# Patient Record
Sex: MALE | Race: WHITE | NOT HISPANIC OR LATINO
[De-identification: names, ages, dates, MRNs, and addresses within clinical notes are randomized per-mention and may not be internally consistent; named-entity substitution may affect disease eponyms.]

---

## 2017-02-15 ENCOUNTER — APPOINTMENT (OUTPATIENT)
Dept: ELECTROPHYSIOLOGY | Facility: CLINIC | Age: 27
End: 2017-02-15

## 2017-02-15 VITALS
HEART RATE: 73 BPM | OXYGEN SATURATION: 97 % | HEIGHT: 69 IN | DIASTOLIC BLOOD PRESSURE: 74 MMHG | BODY MASS INDEX: 35.55 KG/M2 | WEIGHT: 240 LBS | SYSTOLIC BLOOD PRESSURE: 113 MMHG

## 2017-02-21 LAB
ANION GAP SERPL CALC-SCNC: 13 MMOL/L
BUN SERPL-MCNC: 12 MG/DL
CALCIUM SERPL-MCNC: 9.6 MG/DL
CHLORIDE SERPL-SCNC: 100 MMOL/L
CO2 SERPL-SCNC: 28 MMOL/L
CREAT SERPL-MCNC: 0.78 MG/DL
GLUCOSE SERPL-MCNC: 93 MG/DL
MAGNESIUM SERPL-MCNC: 2 MG/DL
POTASSIUM SERPL-SCNC: 4.3 MMOL/L
SODIUM SERPL-SCNC: 141 MMOL/L

## 2017-02-27 ENCOUNTER — APPOINTMENT (OUTPATIENT)
Dept: CARDIOLOGY | Facility: CLINIC | Age: 27
End: 2017-02-27

## 2017-03-15 ENCOUNTER — APPOINTMENT (OUTPATIENT)
Dept: ELECTROPHYSIOLOGY | Facility: CLINIC | Age: 27
End: 2017-03-15

## 2017-03-20 ENCOUNTER — OUTPATIENT (OUTPATIENT)
Dept: OUTPATIENT SERVICES | Facility: HOSPITAL | Age: 27
LOS: 1 days | End: 2017-03-20
Payer: COMMERCIAL

## 2017-03-20 DIAGNOSIS — I49.01 VENTRICULAR FIBRILLATION: ICD-10-CM

## 2017-03-20 PROCEDURE — 93018 CV STRESS TEST I&R ONLY: CPT

## 2017-03-20 PROCEDURE — 93016 CV STRESS TEST SUPVJ ONLY: CPT

## 2017-03-20 PROCEDURE — 93017 CV STRESS TEST TRACING ONLY: CPT

## 2017-03-21 DIAGNOSIS — I49.01 VENTRICULAR FIBRILLATION: ICD-10-CM

## 2017-03-28 ENCOUNTER — APPOINTMENT (OUTPATIENT)
Dept: PULMONOLOGY | Facility: CLINIC | Age: 27
End: 2017-03-28

## 2017-03-28 VITALS
HEIGHT: 69 IN | RESPIRATION RATE: 14 BRPM | BODY MASS INDEX: 37.62 KG/M2 | HEART RATE: 80 BPM | DIASTOLIC BLOOD PRESSURE: 80 MMHG | SYSTOLIC BLOOD PRESSURE: 118 MMHG | OXYGEN SATURATION: 97 % | WEIGHT: 254 LBS

## 2017-03-28 DIAGNOSIS — G47.33 OBSTRUCTIVE SLEEP APNEA (ADULT) (PEDIATRIC): ICD-10-CM

## 2017-03-28 DIAGNOSIS — F17.290 NICOTINE DEPENDENCE, OTHER TOBACCO PRODUCT, UNCOMPLICATED: ICD-10-CM

## 2017-04-19 ENCOUNTER — APPOINTMENT (OUTPATIENT)
Dept: ELECTROPHYSIOLOGY | Facility: CLINIC | Age: 27
End: 2017-04-19
Payer: COMMERCIAL

## 2017-04-19 VITALS — SYSTOLIC BLOOD PRESSURE: 104 MMHG | OXYGEN SATURATION: 100 % | DIASTOLIC BLOOD PRESSURE: 67 MMHG | HEART RATE: 62 BPM

## 2017-04-19 PROCEDURE — 99215 OFFICE O/P EST HI 40 MIN: CPT | Mod: 25

## 2017-04-19 PROCEDURE — 93282 PRGRMG EVAL IMPLANTABLE DFB: CPT

## 2017-11-01 ENCOUNTER — APPOINTMENT (OUTPATIENT)
Dept: ELECTROPHYSIOLOGY | Facility: CLINIC | Age: 27
End: 2017-11-01
Payer: COMMERCIAL

## 2017-11-01 VITALS
HEIGHT: 69 IN | SYSTOLIC BLOOD PRESSURE: 112 MMHG | DIASTOLIC BLOOD PRESSURE: 69 MMHG | HEART RATE: 62 BPM | OXYGEN SATURATION: 99 %

## 2017-11-01 PROCEDURE — 93282 PRGRMG EVAL IMPLANTABLE DFB: CPT

## 2018-01-29 LAB
ANION GAP SERPL CALC-SCNC: 14 MMOL/L
BUN SERPL-MCNC: 17 MG/DL
CALCIUM SERPL-MCNC: 9.6 MG/DL
CHLORIDE SERPL-SCNC: 102 MMOL/L
CO2 SERPL-SCNC: 25 MMOL/L
CREAT SERPL-MCNC: 0.82 MG/DL
GLUCOSE SERPL-MCNC: 93 MG/DL
MAGNESIUM SERPL-MCNC: 2.1 MG/DL
POTASSIUM SERPL-SCNC: 4.7 MMOL/L
SODIUM SERPL-SCNC: 141 MMOL/L
TSH SERPL-ACNC: 2.08 UIU/ML

## 2018-02-07 ENCOUNTER — APPOINTMENT (OUTPATIENT)
Dept: ELECTROPHYSIOLOGY | Facility: CLINIC | Age: 28
End: 2018-02-07

## 2018-02-28 ENCOUNTER — APPOINTMENT (OUTPATIENT)
Dept: ELECTROPHYSIOLOGY | Facility: CLINIC | Age: 28
End: 2018-02-28

## 2018-03-28 ENCOUNTER — APPOINTMENT (OUTPATIENT)
Dept: ELECTROPHYSIOLOGY | Facility: CLINIC | Age: 28
End: 2018-03-28

## 2018-04-04 ENCOUNTER — APPOINTMENT (OUTPATIENT)
Dept: ELECTROPHYSIOLOGY | Facility: CLINIC | Age: 28
End: 2018-04-04

## 2018-04-18 ENCOUNTER — APPOINTMENT (OUTPATIENT)
Dept: ELECTROPHYSIOLOGY | Facility: CLINIC | Age: 28
End: 2018-04-18

## 2018-05-23 ENCOUNTER — APPOINTMENT (OUTPATIENT)
Dept: ELECTROPHYSIOLOGY | Facility: CLINIC | Age: 28
End: 2018-05-23
Payer: COMMERCIAL

## 2018-05-23 VITALS
WEIGHT: 236 LBS | DIASTOLIC BLOOD PRESSURE: 68 MMHG | OXYGEN SATURATION: 100 % | BODY MASS INDEX: 34.85 KG/M2 | HEART RATE: 75 BPM | SYSTOLIC BLOOD PRESSURE: 109 MMHG

## 2018-05-23 PROCEDURE — 93282 PRGRMG EVAL IMPLANTABLE DFB: CPT

## 2018-05-23 PROCEDURE — 99215 OFFICE O/P EST HI 40 MIN: CPT | Mod: 25

## 2018-09-12 ENCOUNTER — APPOINTMENT (OUTPATIENT)
Dept: ELECTROPHYSIOLOGY | Facility: CLINIC | Age: 28
End: 2018-09-12

## 2019-06-04 ENCOUNTER — MEDICATION RENEWAL (OUTPATIENT)
Age: 29
End: 2019-06-04

## 2019-09-17 ENCOUNTER — APPOINTMENT (OUTPATIENT)
Dept: ELECTROPHYSIOLOGY | Facility: CLINIC | Age: 29
End: 2019-09-17
Payer: COMMERCIAL

## 2019-09-17 ENCOUNTER — NON-APPOINTMENT (OUTPATIENT)
Age: 29
End: 2019-09-17

## 2019-09-17 VITALS
DIASTOLIC BLOOD PRESSURE: 70 MMHG | HEART RATE: 52 BPM | WEIGHT: 220 LBS | SYSTOLIC BLOOD PRESSURE: 121 MMHG | OXYGEN SATURATION: 100 % | HEIGHT: 69 IN | BODY MASS INDEX: 32.58 KG/M2

## 2019-09-17 PROCEDURE — 99214 OFFICE O/P EST MOD 30 MIN: CPT | Mod: 25

## 2019-09-17 PROCEDURE — 93260 PRGRMG DEV EVAL IMPLTBL SYS: CPT

## 2019-09-17 PROCEDURE — 93000 ELECTROCARDIOGRAM COMPLETE: CPT | Mod: 59

## 2019-09-17 NOTE — PHYSICAL EXAM
[General Appearance - Well Developed] : well developed [Normal Appearance] : normal appearance [General Appearance - Well Nourished] : well nourished [Normal Jugular Venous V Waves Present] : normal jugular venous V waves present [Respiration, Rhythm And Depth] : normal respiratory rhythm and effort [Auscultation Breath Sounds / Voice Sounds] : lungs were clear to auscultation bilaterally [Exaggerated Use Of Accessory Muscles For Inspiration] : no accessory muscle use [Heart Rate And Rhythm] : heart rate and rhythm were normal [Heart Sounds] : normal S1 and S2 [Bowel Sounds] : normal bowel sounds [Abdomen Soft] : soft [Abnormal Walk] : normal gait [Nail Clubbing] : no clubbing of the fingernails [Skin Color & Pigmentation] : normal skin color and pigmentation [] : no rash [Oriented To Time, Place, And Person] : oriented to person, place, and time [No Anxiety] : not feeling anxious

## 2019-09-17 NOTE — PHYSICAL EXAM
[Normal Appearance] : normal appearance [General Appearance - Well Developed] : well developed [General Appearance - Well Nourished] : well nourished [Normal Jugular Venous V Waves Present] : normal jugular venous V waves present [Respiration, Rhythm And Depth] : normal respiratory rhythm and effort [Exaggerated Use Of Accessory Muscles For Inspiration] : no accessory muscle use [Auscultation Breath Sounds / Voice Sounds] : lungs were clear to auscultation bilaterally [Heart Rate And Rhythm] : heart rate and rhythm were normal [Heart Sounds] : normal S1 and S2 [Bowel Sounds] : normal bowel sounds [Abdomen Soft] : soft [Nail Clubbing] : no clubbing of the fingernails [Abnormal Walk] : normal gait [Skin Color & Pigmentation] : normal skin color and pigmentation [Oriented To Time, Place, And Person] : oriented to person, place, and time [] : no rash [No Anxiety] : not feeling anxious

## 2019-09-18 ENCOUNTER — OUTPATIENT (OUTPATIENT)
Dept: OUTPATIENT SERVICES | Facility: HOSPITAL | Age: 29
LOS: 1 days | End: 2019-09-18
Payer: COMMERCIAL

## 2019-09-18 ENCOUNTER — OTHER (OUTPATIENT)
Age: 29
End: 2019-09-18

## 2019-09-18 ENCOUNTER — APPOINTMENT (OUTPATIENT)
Dept: RADIOLOGY | Facility: CLINIC | Age: 29
End: 2019-09-18
Payer: COMMERCIAL

## 2019-09-18 DIAGNOSIS — Z45.02 ENCOUNTER FOR ADJUSTMENT AND MANAGEMENT OF AUTOMATIC IMPLANTABLE CARDIAC DEFIBRILLATOR: ICD-10-CM

## 2019-09-18 PROCEDURE — 71046 X-RAY EXAM CHEST 2 VIEWS: CPT

## 2019-09-18 PROCEDURE — 71046 X-RAY EXAM CHEST 2 VIEWS: CPT | Mod: 26

## 2019-09-18 NOTE — HISTORY OF PRESENT ILLNESS
[FreeTextEntry1] : 30 y/o M with pmhx of VF arrest during sleep s/p SICD @ [facility] with Dr. Black s/p generator change (3/24/2015, Dr. Fiore) due to premature battery depletion, repeat generator change and pocket revision (1/2016, Dr. Black) due to posterior migration, multiple inappropriate shocks for TENS unit and subsequently lead artifact resulting in vector reprograming (10/2016), appropriate ICD shock for PMVT during sleep (?long short, 2/2017), and PAF degenerating into VT @ 290bpm s/p ICD shock (8/2018).  \par Initially presented to [facility] (11/2013) w/ VF arrest. Intubated and placed in hypothermic protocol, transferred to [facility] with Dr. Black.  EPS was negative for inducible arrhythmias and cardiac MRI was unrevealing with normal biventricular function EF 59% and no LGE.  Per patient underwent genetic testing, which was negative, but no report is available. \par \par Presents today following an "untreated episode" on remote monitoring c/w AF with RVR that appears to degenerate into VT @ 300bpm (9/6/2019).  VT terminated prior to HV therapy. \par Had correlating symptoms of palpitations and near syncope.  Denies syncope or LOC. On Metoprolol 100mg and remains compliant with this medication.  \par \par In addition, we were notified by [employer] that lead Impedance has intermittently been "out of range" for the past month concerning for lose pin or impending lead fracture. \par Impedance measured today remains within normal range.  NO evidence of artifact or lead noise on primary vector.

## 2019-09-18 NOTE — DISCUSSION/SUMMARY
[FreeTextEntry1] : 28 y/o M with pmhx of VF arrest of unclear etiology s/p SICD w/ generator change (3/24/2015 - premature battery depletion), repeat generator change/pocket revision (1/2016 - posterior migration, multiple inappropriate shocks for lead noise resulting in vector reprograming (10/2016), appropriate ICD shock for PMVT during sleep (2/2017), and intermittent symptomatic PAF w/ RVR with prior degeneration into VT @ 290bpm s/p ICD shock (8/2018).  \par Presents today due to concern for lead impedance and integrity, as well as recent untreated episode of symptomatic PAF with RVR and VT.  [id number] 0 - not current on a/c.\par \par -  Per [facility] [employer], recommend Chest Xray PA/Lateral with additional magnified image of ICD can to assess lead insertion.  Will review images with [place] and Dr. Barkley to determine the next appropriate step\par - Regarding AF with RVR and resultant VT.  Increase Metoprolol 200mg. Once ICD evaluation is complete, will consider more aggressive rhythm control strategy, including AF ablation, to avoid future ICD shocks. \par - Advised to f/up for genetic testing as previously advised by Dr. Black\par - Advised to closest ED if he develops any symptoms especially dizziness, LH, syncope near syncope or ICD shocks\par \par Seen and Dr. Barkley \par Caitlin Salazar PAC

## 2019-09-18 NOTE — REVIEW OF SYSTEMS
[Palpitations] : palpitations [Dizziness] : dizziness [Negative] : Heme/Lymph [Fever] : no fever [Chills] : no chills [Feeling Fatigued] : not feeling fatigued [Blurry Vision] : no blurred vision [Chest  Pressure] : no chest pressure [Shortness Of Breath] : no shortness of breath [Dyspnea on exertion] : not dyspnea during exertion [Cough] : no cough [Chest Pain] : no chest pain

## 2019-09-18 NOTE — REVIEW OF SYSTEMS
[Palpitations] : palpitations [Dizziness] : dizziness [Negative] : Heme/Lymph [Chills] : no chills [Fever] : no fever [Blurry Vision] : no blurred vision [Feeling Fatigued] : not feeling fatigued [Chest  Pressure] : no chest pressure [Dyspnea on exertion] : not dyspnea during exertion [Shortness Of Breath] : no shortness of breath [Chest Pain] : no chest pain [Cough] : no cough

## 2019-09-18 NOTE — DISCUSSION/SUMMARY
[FreeTextEntry1] : 30 y/o M with pmhx of VF arrest of unclear etiology s/p SICD w/ generator change (3/24/2015 - premature battery depletion), repeat generator change/pocket revision (1/2016 - posterior migration, multiple inappropriate shocks for lead noise resulting in vector reprograming (10/2016), appropriate ICD shock for PMVT during sleep (2/2017), and intermittent symptomatic PAF w/ RVR with prior degeneration into VT @ 290bpm s/p ICD shock (8/2018).  \par Presents today due to concern for lead impedance and integrity, as well as recent untreated episode of symptomatic PAF with RVR and VT.  [id number] 0 - not current on a/c.\par \par -  Per [employer], recommend Chest Xray PA/Lateral with additional magnified image of ICD can to assess lead insertion.  Will review images with [employer] and Dr. Barkley to determine the next appropriate step\par - Regarding AF with RVR and resultant VT.  Increase Metoprolol 200mg. Once ICD evaluation is complete, will consider more aggressive rhythm control strategy, including AF ablation, to avoid future ICD shocks. \par - Advised to f/up for genetic testing as previously advised by Dr. Black\par - Advised to closest ED if he develops any symptoms especially dizziness, LH, syncope near syncope or ICD shocks\par \par Seen and Dr. Barkley \par Caitlin Salazar PAC

## 2019-09-18 NOTE — HISTORY OF PRESENT ILLNESS
[FreeTextEntry1] : 28 y/o M with pmhx of VF arrest during sleep s/p SICD @ [facility] with Dr. Black s/p generator change (3/24/2015, Dr. Fiore) due to premature battery depletion, repeat generator change and pocket revision (1/2016, Dr. Black) due to posterior migration, multiple inappropriate shocks for TENS unit and subsequently lead artifact resulting in vector reprograming (10/2016), appropriate ICD shock for PMVT during sleep (?long short, 2/2017), and PAF degenerating into VT @ 290bpm s/p ICD shock (8/2018).  \par Initially presented to [facility] (11/2013) w/ VF arrest. Intubated and placed in hypothermic protocol, transferred to [facility] with Dr. Black.  EPS was negative for inducible arrhythmias and cardiac MRI was unrevealing with normal biventricular function EF 59% and no LGE.  Per patient underwent genetic testing, which was negative, but no report is available. \par \par Presents today following an "untreated episode" on remote monitoring c/w AF with RVR that appears to degenerate into VT @ 300bpm (9/6/2019).  VT terminated prior to HV therapy. \par Had correlating symptoms of palpitations and near syncope.  Denies syncope or LOC. On Metoprolol 100mg and remains compliant with this medication.  \par \par In addition, we were notified by [employer] that lead Impedance has intermittently been "out of range" for the past month concerning for lose pin or impending lead fracture. \par Impedance measured today remains within normal range.  NO evidence of artifact or lead noise on primary vector.

## 2019-09-18 NOTE — ADDENDUM
[FreeTextEntry1] : I have personally seen, examined, and participated in the care of this patient. I have reviewed all pertinent clinical information, including history, physical exam, plan, and the PA/NP's note and agree except as noted below.\par \par Briefly, 29 year old male with h/o VF arrest s/p S-ICD with multiple issues over the past several years requiring 2 generator replacements (2015 premature battery depletion, 1/2016 posterior device migration, inappropriate shocks from lead noise) but also with appropriate ICD shocks who was noted to have multiple high impedance alerts on his device for which he presents today. Last week he had AF with RVR with precipitation of VT which resolved spontaneously.\par \par Discussed with [facility] who recommended patient get 2vCXR to evaluate for possible lead/header issue. Will double Metoprolol to 200mg daily to reduce ventricular rates while in AF.\par \par Aydin Barkley MD\par Clinical Cardiac Electrophysiology

## 2019-09-20 ENCOUNTER — INPATIENT (INPATIENT)
Facility: HOSPITAL | Age: 29
LOS: 0 days | Discharge: HOME | DRG: 245 | End: 2019-09-21
Attending: INTERNAL MEDICINE | Admitting: INTERNAL MEDICINE
Payer: COMMERCIAL

## 2019-09-20 ENCOUNTER — TRANSCRIPTION ENCOUNTER (OUTPATIENT)
Age: 29
End: 2019-09-20

## 2019-09-20 VITALS
HEIGHT: 69 IN | DIASTOLIC BLOOD PRESSURE: 67 MMHG | SYSTOLIC BLOOD PRESSURE: 118 MMHG | OXYGEN SATURATION: 100 % | TEMPERATURE: 98 F | WEIGHT: 220.02 LBS | HEART RATE: 87 BPM

## 2019-09-20 DIAGNOSIS — I49.1 ATRIAL PREMATURE DEPOLARIZATION: ICD-10-CM

## 2019-09-20 DIAGNOSIS — Z95.810 PRESENCE OF AUTOMATIC (IMPLANTABLE) CARDIAC DEFIBRILLATOR: Chronic | ICD-10-CM

## 2019-09-20 LAB
ABO RH CONFIRMATION: SIGNIFICANT CHANGE UP
ANION GAP SERPL CALC-SCNC: 11 MMOL/L — SIGNIFICANT CHANGE UP (ref 5–17)
APTT BLD: 29.1 SEC — SIGNIFICANT CHANGE UP (ref 27.5–36.3)
BLD GP AB SCN SERPL QL: SIGNIFICANT CHANGE UP
BUN SERPL-MCNC: 17 MG/DL — SIGNIFICANT CHANGE UP (ref 8–20)
CALCIUM SERPL-MCNC: 9.2 MG/DL — SIGNIFICANT CHANGE UP (ref 8.6–10.2)
CHLORIDE SERPL-SCNC: 100 MMOL/L — SIGNIFICANT CHANGE UP (ref 98–107)
CO2 SERPL-SCNC: 27 MMOL/L — SIGNIFICANT CHANGE UP (ref 22–29)
CREAT SERPL-MCNC: 0.64 MG/DL — SIGNIFICANT CHANGE UP (ref 0.5–1.3)
GLUCOSE SERPL-MCNC: 92 MG/DL — SIGNIFICANT CHANGE UP (ref 70–115)
HCT VFR BLD CALC: 41.6 % — SIGNIFICANT CHANGE UP (ref 39–50)
HGB BLD-MCNC: 13.2 G/DL — SIGNIFICANT CHANGE UP (ref 13–17)
INR BLD: 0.95 RATIO — SIGNIFICANT CHANGE UP (ref 0.88–1.16)
MAGNESIUM SERPL-MCNC: 2.2 MG/DL — SIGNIFICANT CHANGE UP (ref 1.6–2.6)
MCHC RBC-ENTMCNC: 28.1 PG — SIGNIFICANT CHANGE UP (ref 27–34)
MCHC RBC-ENTMCNC: 31.7 GM/DL — LOW (ref 32–36)
MCV RBC AUTO: 88.7 FL — SIGNIFICANT CHANGE UP (ref 80–100)
PLATELET # BLD AUTO: 76 K/UL — LOW (ref 150–400)
POTASSIUM SERPL-MCNC: 4.6 MMOL/L — SIGNIFICANT CHANGE UP (ref 3.5–5.3)
POTASSIUM SERPL-SCNC: 4.6 MMOL/L — SIGNIFICANT CHANGE UP (ref 3.5–5.3)
PROTHROM AB SERPL-ACNC: 10.9 SEC — SIGNIFICANT CHANGE UP (ref 10–12.9)
RBC # BLD: 4.69 M/UL — SIGNIFICANT CHANGE UP (ref 4.2–5.8)
RBC # FLD: 12.8 % — SIGNIFICANT CHANGE UP (ref 10.3–14.5)
SODIUM SERPL-SCNC: 138 MMOL/L — SIGNIFICANT CHANGE UP (ref 135–145)
WBC # BLD: 4.99 K/UL — SIGNIFICANT CHANGE UP (ref 3.8–10.5)
WBC # FLD AUTO: 4.99 K/UL — SIGNIFICANT CHANGE UP (ref 3.8–10.5)

## 2019-09-20 PROCEDURE — 71045 X-RAY EXAM CHEST 1 VIEW: CPT | Mod: 26

## 2019-09-20 PROCEDURE — 33272 RMVL OF SUBQ DEFIBRILLATOR: CPT

## 2019-09-20 PROCEDURE — 93010 ELECTROCARDIOGRAM REPORT: CPT | Mod: 76

## 2019-09-20 PROCEDURE — 33270 INS/REP SUBQ DEFIBRILLATOR: CPT

## 2019-09-20 RX ORDER — KETOROLAC TROMETHAMINE 30 MG/ML
15 SYRINGE (ML) INJECTION ONCE
Refills: 0 | Status: DISCONTINUED | OUTPATIENT
Start: 2019-09-21 | End: 2019-09-21

## 2019-09-20 RX ORDER — ACETAMINOPHEN 500 MG
650 TABLET ORAL EVERY 6 HOURS
Refills: 0 | Status: DISCONTINUED | OUTPATIENT
Start: 2019-09-20 | End: 2019-09-21

## 2019-09-20 RX ORDER — CEFAZOLIN SODIUM 1 G
2000 VIAL (EA) INJECTION
Refills: 0 | Status: COMPLETED | OUTPATIENT
Start: 2019-09-21 | End: 2019-09-21

## 2019-09-20 RX ORDER — METOPROLOL TARTRATE 50 MG
1 TABLET ORAL
Qty: 0 | Refills: 0 | DISCHARGE

## 2019-09-20 RX ORDER — CEPHALEXIN 500 MG
500 CAPSULE ORAL EVERY 12 HOURS
Refills: 0 | Status: DISCONTINUED | OUTPATIENT
Start: 2019-09-21 | End: 2019-09-21

## 2019-09-20 RX ORDER — OXYCODONE AND ACETAMINOPHEN 5; 325 MG/1; MG/1
1 TABLET ORAL EVERY 4 HOURS
Refills: 0 | Status: DISCONTINUED | OUTPATIENT
Start: 2019-09-20 | End: 2019-09-21

## 2019-09-20 RX ORDER — HYDROMORPHONE HYDROCHLORIDE 2 MG/ML
0.5 INJECTION INTRAMUSCULAR; INTRAVENOUS; SUBCUTANEOUS EVERY 4 HOURS
Refills: 0 | Status: DISCONTINUED | OUTPATIENT
Start: 2019-09-20 | End: 2019-09-21

## 2019-09-20 RX ORDER — METOPROLOL TARTRATE 50 MG
200 TABLET ORAL DAILY
Refills: 0 | Status: DISCONTINUED | OUTPATIENT
Start: 2019-09-20 | End: 2019-09-21

## 2019-09-20 RX ADMIN — HYDROMORPHONE HYDROCHLORIDE 0.5 MILLIGRAM(S): 2 INJECTION INTRAMUSCULAR; INTRAVENOUS; SUBCUTANEOUS at 20:55

## 2019-09-20 RX ADMIN — HYDROMORPHONE HYDROCHLORIDE 0.5 MILLIGRAM(S): 2 INJECTION INTRAMUSCULAR; INTRAVENOUS; SUBCUTANEOUS at 21:10

## 2019-09-20 RX ADMIN — Medication 200 MILLIGRAM(S): at 23:12

## 2019-09-20 NOTE — H&P PST ADULT - ASSESSMENT
29 year old with history of VF arrest of unclear etiology s/p SICD @ [facility] w/Dr. Black, w/ generator change (3/24/2015 - premature battery depletion), repeat generator change/pocket revision (1/2016 - posterior migration, multiple inappropriate shocks for lead noise resulting in vector reprograming (10/2016), appropriate ICD shock for PMVT during sleep (2/2017), and intermittent symptomatic PAF w/ RVR with prior degeneration into VT @ 290bpm s/p ICD shock (8/2018).  He had a recent untreated episode of symptomatic PAF with RVR and RV, CHADSVASC = 0 not on anticoagulation.  In addition, we were notified by [employer] that lead Impedance has intermittently been "out of range" for the past month concerning for lose pin or impending lead fracture.   On recent device interrogation, lead impedance measured remains within normal range.  NO evidence of artifact or lead noise on primary vector.  [employer] technician recommended CXR PA and LAT to evaluate lead positioning.  Upon review of the images, the patient's electrode pin appears to be well seated in the header and the device otherwise appears to be well positioned.  These findings were discussed with the [employer] team, who felt that it is possible that the set screw could be loose in the header, and if not that, possible electrode problem.  It was recommended that pt pursue lead revision.  Pt presents today for elective SQ ICD explant/implant.

## 2019-09-20 NOTE — DISCHARGE NOTE PROVIDER - CARE PROVIDER_API CALL
Aydin Barkley)  Cardiology; Internal Medicine  [address] [place] [address], [place] [address]  Phone: [phone]  Fax: [phone]  Follow Up Time: Aydin Barkley)  Cardiology; Internal Medicine  [address] [place] [address], [place] [address]  Phone: [phone]  Fax: [phone]  Follow Up Time: 2 weeks

## 2019-09-20 NOTE — DISCHARGE NOTE PROVIDER - NSDCFUSCHEDAPPT_GEN_ALL_CORE_FT
JESS CRAMER ; 10/10/2019 ; [facility] ; 10/10/2019 ; [facility] ; 10/10/2019 ; [facility]

## 2019-09-20 NOTE — H&P PST ADULT - RS GEN PE MLT RESP DETAILS PC
no rhonchi/no wheezes/respirations non-labored/clear to auscultation bilaterally/good air movement/breath sounds equal/airway patent

## 2019-09-20 NOTE — PROGRESS NOTE ADULT - SUBJECTIVE AND OBJECTIVE BOX
Admission Criteria  Please admit the patient to the following service: CARDIOLOGY    Major Criteria:  - Continuous EKG monitoring is required for condition causing arrhythmia (hyperkalemia, etc)  - Significant volume load > 200 ml    Admit to: 3GUL     Patient is being admitted to the inpatient service due to high risk characteristics and need for further management/monitoring and is considered to be at a significantly increased risk of major adverse cardiac and vascular events if discharged.

## 2019-09-20 NOTE — DISCHARGE NOTE PROVIDER - NSDCCPCAREPLAN_GEN_ALL_CORE_FT
PRINCIPAL DISCHARGE DIAGNOSIS  Diagnosis: Ventricular fibrillation  Assessment and Plan of Treatment:

## 2019-09-20 NOTE — DISCHARGE NOTE PROVIDER - NSDCCPTREATMENT_GEN_ALL_CORE_FT
PRINCIPAL PROCEDURE  Procedure: Insertion or replacement, ICD system, single or dual chamber  Findings and Treatment: Cardiac Device Implant Post Operative Instructions  - Do not touch the incision until it is completely healed.   - There are Steristrips (white strips of tape) on your incision, which will start to peel off on their own over the next 2-3 weeks. Do not pick at or peel off the Steristrips.   - Bruising around the implant site or over the chest, side or arm near the incision is normal, and will take a few weeks to resolve.  -Do not lift the affected arm higher than 90 degrees (shoulder height) in any direction for 4 weeks.   - Do not push, pull or lift anything heavier than 10 lbs (about a gallon of milk) with the affected arm for 4 weeks.     - Do not apply soaps, creams, lotions, ointments or powders to the incision until it is completely healed.  - You may take a shower in 24 hours, and allow the water to run over the incision. However, do not submerge the incision in water: do not swim or soak in bath tubs, hot tubs, swimming pools, etc.   You should call the doctor if:   - You notice redness, drainage, swelling, increased tenderness, hot sensation around the incision, bleeding or incision edges pulling apart.  - Your temperature is greater than 100 degrees F for more than 24 hours.  - You notice swelling or bulging at the incision or around the device that was not there when you left the hospital or is increasing in size.  - You experience increased difficulty breathing.  - You notice new/worsening swelling in your legs and ankles.  - You faint or have dizzy spells.  - You have any questions or concerns regarding your device or the procedure.

## 2019-09-20 NOTE — DISCHARGE NOTE PROVIDER - NSDCFUADDINST_GEN_ALL_CORE_FT
Follow up with Dr. Barkley in 2 weeks.  Our office will contact you in 3-5 days to schedule this appointment. Please call [phone] with questions or concerns. Follow up with Dr. Barkley in 2 weeks.  Our office will contact you in 3-5 days to schedule this appointment. Please call [phone] with questions or concerns.  Follow up with Heme/Onc as outpatient to assess for thrombocytopenia

## 2019-09-20 NOTE — H&P PST ADULT - HISTORY OF PRESENT ILLNESS
29 year old with history of VF arrest of unclear etiology s/p SICD @ [facility] w/Dr. Black, w/ generator change (3/24/2015 - premature battery depletion), repeat generator change/pocket revision (2016 - posterior migration, multiple inappropriate shocks for lead noise resulting in vector reprograming (10/2016), appropriate ICD shock for PMVT during sleep (2017), and intermittent symptomatic PAF w/ RVR with prior degeneration into VT @ 290bpm s/p ICD shock (2018).  He had a recent untreated episode of symptomatic PAF with RVR and RV, CHADSVASC = 0 not on anticoagulation.  In addition, we were notified by [employer] that lead Impedance has intermittently been "out of range" for the past month concerning for lose pin or impending lead fracture.   On recent device interrogation, lead impedance measured remains within normal range.  NO evidence of artifact or lead noise on primary vector.  [employer] technician recommended CXR PA and LAT to evaluate lead positioning.  Upon review of the images, the patient's electrode pin appears to be well seated in the header and the device otherwise appears to be well positioned.  These findings were discussed with the [employer] team, who felt that it is possible that the set screw could be loose in the header, and if not that, possible electrode problem.  It was recommended that pt pursue lead revision.  Pt presents today for elective SQ ICD explant/implant.  I    Cardiology Summary:  EK2019, SR @ 69bpm, normal axis, QTc 415   Cardiac MRI: 2013, normal biventricular function, NO LGE 29 year old with history of VF arrest of unclear etiology s/p SICD @ [facility] w/Dr. Black, w/ generator change (3/24/2015 - premature battery depletion), repeat generator change/pocket revision (2016 - posterior migration, multiple inappropriate shocks for lead noise resulting in vector reprograming (10/2016), appropriate ICD shock for PMVT during sleep (2017), and intermittent symptomatic PAF w/ RVR with prior degeneration into VT @ 290bpm s/p ICD shock (2018).  He had a recent untreated episode of symptomatic PAF with RVR and RV, CHADSVASC = 0 not on anticoagulation.  In addition, we were notified by [employer] that lead Impedance has intermittently been "out of range" for the past month concerning for lose pin or impending lead fracture.   On recent device interrogation, lead impedance measured remains within normal range.  NO evidence of artifact or lead noise on primary vector.  [employer] technician recommended CXR PA and LAT to evaluate lead positioning.  Upon review of the images, the patient's electrode pin appears to be well seated in the header and the device otherwise appears to be well positioned.  These findings were discussed with the [employer] team, who felt that it is possible that the set screw could be loose in the header, and if not that, possible electrode problem.  It was recommended that pt pursue lead revision.  Pt presents today for elective SQ ICD explant/implant.      Cardiology Summary:  EK2019, SR @ 69bpm, normal axis, QTc 415   Cardiac MRI: 2013, normal biventricular function, NO LGE

## 2019-09-20 NOTE — DISCHARGE NOTE PROVIDER - HOSPITAL COURSE
29 year old with history of VF arrest of unclear etiology s/p SICD @ [facility] w/Dr. Black, w/ generator change (3/24/2015 - premature battery depletion), repeat generator change/pocket revision (1/2016 - posterior migration, multiple inappropriate shocks for lead noise resulting in vector reprograming (10/2016), appropriate ICD shock for PMVT during sleep (2/2017), and intermittent symptomatic PAF w/ RVR with prior degeneration into VT @ 290bpm s/p ICD shock (8/2018).  He had a recent untreated episode of symptomatic PAF with RVR and RV, CHADSVASC = 0 not on anticoagulation.  In addition, we were notified by [employer] that lead Impedance has intermittently been "out of range" for the past month concerning for lose pin or impending lead fracture.     On recent device interrogation, lead impedance measured remains within normal range.  NO evidence of artifact or lead noise on primary vector.  [employer] technician recommended CXR PA and LAT to evaluate lead positioning.  Upon review of the images, the patient's electrode pin appears to be well seated in the header and the device otherwise appears to be well positioned.  These findings were discussed with the [employer] team, who felt that it is possible that the set screw could be loose in the header, and if not that, possible electrode problem.  It was recommended that pt pursue lead revision.  Pt presented electively and is now status post uncomplicated SQ ICD explant and new SQ ICD implant.

## 2019-09-20 NOTE — PROGRESS NOTE ADULT - SUBJECTIVE AND OBJECTIVE BOX
ELECTROPHYSIOLOGY BRIEF POST-OP NOTE    I have personally seen and examined the patient. I agree with the history and physical which I have reviewed and noted any changes below.     PRE-OP DIAGNOSIS: SICD malfunction    POST-OP DIAGNOSIS: same     PROCEDURE: SICD system extraction followed by new SICD system implantation    Physician: Aydin Barkley MD  Assistant: None    ESTIMATED BLOOD LOSS: <10 mL    ANESTHESIA TYPE:  [   ]General Anesthesia  [ x ]Sedation  [ x ]Local/Regional    CONDITION:  [  ]Critical  [  ]Serious  [ x ]Stable  [ x ]Good    SPECIMENS REMOVED (if applicable): Old BSI SICD generator and lead    IMPLANT (if applicable): new BSI Sub Q ICD system    EKG: NSR at 98bpm; QRSD 90ms    Vital Signs Last 24 Hrs  T(C): 36.6 (20 Sep 2019 10:26), Max: 36.6 (20 Sep 2019 10:26)  T(F): 97.8 (20 Sep 2019 10:26), Max: 97.8 (20 Sep 2019 10:26)  HR: 87 (20 Sep 2019 10:26) (87 - 87)  BP: 118/67 (20 Sep 2019 10:26) (118/67 - 118/67)  BP(mean): 84 (20 Sep 2019 10:26) (84 - 84)  SpO2: 100% (20 Sep 2019 10:26) (100% - 100%)    Physical Exam:  Constitutional: NAD, AAOx3  Cardiovascular: +S1S2 RRR  Pulmonary: CTA b/l, unlabored  Left Flank: Abdominal binder and dressings CDI  GI: soft NTND +BS  Extremities: no pedal edema,   Neuro: non focal, MARTINS x4    A/P  29 year old with history of VF arrest of unclear etiology s/p SICD @ [facility] w/Dr. Black, w/ generator change (3/24/2015 - premature battery depletion), repeat generator change/pocket revision (1/2016 - posterior migration, multiple inappropriate shocks for lead noise resulting in vector reprograming (10/2016), appropriate ICD shock for PMVT during sleep (2/2017), and intermittent symptomatic PAF w/ RVR with prior degeneration into VT @ 290bpm s/p ICD shock (8/2018). Patient now s/p successful SICD system extraction and new SICD system implantation for high voltage lead impedances out of range.     -Ancef 2gram IV Q8hr x 2 more doses  -Chest X-ray STAT   -Chest X-ray PA and LAT in am to eval lead position  -NO LOVENOX OR HEPARIN INCLUDING SQ UNLESS CLEARED BY EP  - Aggressive pain Rx  - AM labs and EKG  - Discharge planning home tomorrow AM ELECTROPHYSIOLOGY BRIEF POST-OP NOTE    I have personally seen and examined the patient. I agree with the history and physical which I have reviewed and noted any changes below.     PRE-OP DIAGNOSIS: SICD malfunction    POST-OP DIAGNOSIS: same     PROCEDURE: SICD system extraction followed by new SICD system implantation    Physician: Aydin Barkley MD  Assistant: None    ESTIMATED BLOOD LOSS: <10 mL    ANESTHESIA TYPE:  [   ]General Anesthesia  [ x ]Sedation  [ x ]Local/Regional    CONDITION:  [  ]Critical  [  ]Serious  [ x ]Stable  [ x ]Good    SPECIMENS REMOVED (if applicable): Old BSI SICD generator and lead    IMPLANT (if applicable): new BSI Sub Q ICD system    EKG: NSR at 98bpm; QRSD 90ms    Vital Signs Last 24 Hrs  T(C): 36.6 (20 Sep 2019 10:26), Max: 36.6 (20 Sep 2019 10:26)  T(F): 97.8 (20 Sep 2019 10:26), Max: 97.8 (20 Sep 2019 10:26)  HR: 87 (20 Sep 2019 10:26) (87 - 87)  BP: 118/67 (20 Sep 2019 10:26) (118/67 - 118/67)  BP(mean): 84 (20 Sep 2019 10:26) (84 - 84)  SpO2: 100% (20 Sep 2019 10:26) (100% - 100%)    Physical Exam:  Constitutional: NAD, AAOx3  Cardiovascular: +S1S2 RRR  Pulmonary: CTA b/l, unlabored  Left Flank: Abdominal binder and dressings CDI  GI: soft NTND +BS  Extremities: no pedal edema,   Neuro: non focal, MARTINS x4    A/P  29 year old with history of VF arrest of unclear etiology s/p SICD @ [facility] w/Dr. Black, w/ generator change (3/24/2015 - premature battery depletion), repeat generator change/pocket revision (1/2016 - posterior migration, multiple inappropriate shocks for lead noise resulting in vector reprograming (10/2016), appropriate ICD shock for PMVT during sleep (2/2017), and intermittent symptomatic PAF w/ RVR with prior degeneration into VT @ 290bpm s/p ICD shock (8/2018). Patient now s/p successful SICD system extraction and new SICD system implantation for high voltage lead impedances out of range.     -Ancef 2gram IV Q8hr x 2 more doses; Keflex 500mg BID x3 days at time of discharge  -Chest X-ray STAT   -Chest X-ray PA and LAT in am to eval lead position  -NO LOVENOX OR HEPARIN INCLUDING SQ UNLESS CLEARED BY EP  - Aggressive pain Rx  - AM labs and EKG  - Discharge planning home tomorrow AM

## 2019-09-21 ENCOUNTER — TRANSCRIPTION ENCOUNTER (OUTPATIENT)
Age: 29
End: 2019-09-21

## 2019-09-21 VITALS
SYSTOLIC BLOOD PRESSURE: 125 MMHG | RESPIRATION RATE: 20 BRPM | OXYGEN SATURATION: 99 % | HEART RATE: 70 BPM | DIASTOLIC BLOOD PRESSURE: 59 MMHG | TEMPERATURE: 98 F

## 2019-09-21 LAB
ANION GAP SERPL CALC-SCNC: 12 MMOL/L — SIGNIFICANT CHANGE UP (ref 5–17)
BASOPHILS # BLD AUTO: 0.01 K/UL — SIGNIFICANT CHANGE UP (ref 0–0.2)
BASOPHILS NFR BLD AUTO: 0.1 % — SIGNIFICANT CHANGE UP (ref 0–2)
BUN SERPL-MCNC: 14 MG/DL — SIGNIFICANT CHANGE UP (ref 8–20)
CALCIUM SERPL-MCNC: 8.9 MG/DL — SIGNIFICANT CHANGE UP (ref 8.6–10.2)
CHLORIDE SERPL-SCNC: 101 MMOL/L — SIGNIFICANT CHANGE UP (ref 98–107)
CO2 SERPL-SCNC: 23 MMOL/L — SIGNIFICANT CHANGE UP (ref 22–29)
CREAT SERPL-MCNC: 0.65 MG/DL — SIGNIFICANT CHANGE UP (ref 0.5–1.3)
EOSINOPHIL # BLD AUTO: 0 K/UL — SIGNIFICANT CHANGE UP (ref 0–0.5)
EOSINOPHIL NFR BLD AUTO: 0 % — SIGNIFICANT CHANGE UP (ref 0–6)
GLUCOSE SERPL-MCNC: 123 MG/DL — HIGH (ref 70–115)
HCT VFR BLD CALC: 37.7 % — LOW (ref 39–50)
HGB BLD-MCNC: 12.3 G/DL — LOW (ref 13–17)
IMM GRANULOCYTES NFR BLD AUTO: 0.5 % — SIGNIFICANT CHANGE UP (ref 0–1.5)
LYMPHOCYTES # BLD AUTO: 0.59 K/UL — LOW (ref 1–3.3)
LYMPHOCYTES # BLD AUTO: 5.1 % — LOW (ref 13–44)
MAGNESIUM SERPL-MCNC: 2.1 MG/DL — SIGNIFICANT CHANGE UP (ref 1.6–2.6)
MCHC RBC-ENTMCNC: 28.7 PG — SIGNIFICANT CHANGE UP (ref 27–34)
MCHC RBC-ENTMCNC: 32.6 GM/DL — SIGNIFICANT CHANGE UP (ref 32–36)
MCV RBC AUTO: 88.1 FL — SIGNIFICANT CHANGE UP (ref 80–100)
MONOCYTES # BLD AUTO: 0.37 K/UL — SIGNIFICANT CHANGE UP (ref 0–0.9)
MONOCYTES NFR BLD AUTO: 3.2 % — SIGNIFICANT CHANGE UP (ref 2–14)
NEUTROPHILS # BLD AUTO: 10.54 K/UL — HIGH (ref 1.8–7.4)
NEUTROPHILS NFR BLD AUTO: 91.1 % — HIGH (ref 43–77)
PLATELET # BLD AUTO: 90 K/UL — LOW (ref 150–400)
POTASSIUM SERPL-MCNC: 4.3 MMOL/L — SIGNIFICANT CHANGE UP (ref 3.5–5.3)
POTASSIUM SERPL-SCNC: 4.3 MMOL/L — SIGNIFICANT CHANGE UP (ref 3.5–5.3)
RBC # BLD: 4.28 M/UL — SIGNIFICANT CHANGE UP (ref 4.2–5.8)
RBC # FLD: 12.8 % — SIGNIFICANT CHANGE UP (ref 10.3–14.5)
SODIUM SERPL-SCNC: 136 MMOL/L — SIGNIFICANT CHANGE UP (ref 135–145)
WBC # BLD: 11.57 K/UL — HIGH (ref 3.8–10.5)
WBC # FLD AUTO: 11.57 K/UL — HIGH (ref 3.8–10.5)

## 2019-09-21 PROCEDURE — 93010 ELECTROCARDIOGRAM REPORT: CPT

## 2019-09-21 PROCEDURE — 71046 X-RAY EXAM CHEST 2 VIEWS: CPT | Mod: 26

## 2019-09-21 RX ORDER — ACETAMINOPHEN 500 MG
2 TABLET ORAL
Qty: 0 | Refills: 0 | DISCHARGE
Start: 2019-09-21

## 2019-09-21 RX ORDER — CEPHALEXIN 500 MG
1 CAPSULE ORAL
Qty: 6 | Refills: 0
Start: 2019-09-21 | End: 2019-09-23

## 2019-09-21 RX ADMIN — HYDROMORPHONE HYDROCHLORIDE 0.5 MILLIGRAM(S): 2 INJECTION INTRAMUSCULAR; INTRAVENOUS; SUBCUTANEOUS at 10:15

## 2019-09-21 RX ADMIN — Medication 100 MILLIGRAM(S): at 09:06

## 2019-09-21 RX ADMIN — HYDROMORPHONE HYDROCHLORIDE 0.5 MILLIGRAM(S): 2 INJECTION INTRAMUSCULAR; INTRAVENOUS; SUBCUTANEOUS at 01:20

## 2019-09-21 RX ADMIN — HYDROMORPHONE HYDROCHLORIDE 0.5 MILLIGRAM(S): 2 INJECTION INTRAMUSCULAR; INTRAVENOUS; SUBCUTANEOUS at 06:30

## 2019-09-21 RX ADMIN — Medication 100 MILLIGRAM(S): at 00:20

## 2019-09-21 RX ADMIN — Medication 15 MILLIGRAM(S): at 00:35

## 2019-09-21 RX ADMIN — HYDROMORPHONE HYDROCHLORIDE 0.5 MILLIGRAM(S): 2 INJECTION INTRAMUSCULAR; INTRAVENOUS; SUBCUTANEOUS at 06:10

## 2019-09-21 RX ADMIN — HYDROMORPHONE HYDROCHLORIDE 0.5 MILLIGRAM(S): 2 INJECTION INTRAMUSCULAR; INTRAVENOUS; SUBCUTANEOUS at 10:07

## 2019-09-21 RX ADMIN — HYDROMORPHONE HYDROCHLORIDE 0.5 MILLIGRAM(S): 2 INJECTION INTRAMUSCULAR; INTRAVENOUS; SUBCUTANEOUS at 01:05

## 2019-09-21 RX ADMIN — Medication 15 MILLIGRAM(S): at 00:20

## 2019-09-21 NOTE — PROGRESS NOTE ADULT - SUBJECTIVE AND OBJECTIVE BOX
Patient is a 29y old  Male who presents with a chief complaint of Admission post SICD device interrogation (20 Sep 2019 19:18)      HPI:  29 year old with history of VF arrest of unclear etiology s/p SICD @ [facility] w/Dr. Black, w/ generator change (3/24/2015 - premature battery depletion), repeat generator change/pocket revision (2016 - posterior migration, multiple inappropriate shocks for lead noise resulting in vector reprograming (10/2016), appropriate ICD shock for PMVT during sleep (2017), and intermittent symptomatic PAF w/ RVR with prior degeneration into VT @ 290bpm s/p ICD shock (2018).  He had a recent untreated episode of symptomatic PAF with RVR and RV, CHADSVASC = 0 not on anticoagulation.  In addition, we were notified by [facility] that lead Impedance has intermittently been "out of range" for the past month concerning for lose pin or impending lead fracture.   On recent device interrogation, lead impedance measured remains within normal range.  NO evidence of artifact or lead noise on primary vector.  [facility] technician recommended CXR PA and LAT to evaluate lead positioning.  Upon review of the images, the patient's electrode pin appears to be well seated in the header and the device otherwise appears to be well positioned.  These findings were discussed with the [facility] team, who felt that it is possible that the set screw could be loose in the header, and if not that, possible electrode problem.  It was recommended that pt pursue lead revision.  Pt presents today for elective SQ ICD explant/implant.      Patient now sp SQ ICD replacement with [employer] SQ-RX    Cardiology Summary:  [id number]  Sinus Bradycardia with APC's and PVC's   Cardiac MRI: 2013, normal biventricular function, NO LGE (20 Sep 2019 10:26)      PAST MEDICAL & SURGICAL HISTORY:  Obstructive sleep apnea  History of ventricular fibrillation  S/P ICD (internal cardiac defibrillator) procedure: SQ ICD      PREVIOUS DIAGNOSTIC TESTING:        Allergies    No Known Allergies    Intolerances        MEDICATIONS  (STANDING):  cephalexin 500 milliGRAM(s) Oral every 12 hours  metoprolol succinate  milliGRAM(s) Oral daily    MEDICATIONS  (PRN):  acetaminophen   Tablet .. 650 milliGRAM(s) Oral every 6 hours PRN Mild Pain (1 - 3)  aluminum hydroxide/magnesium hydroxide/simethicone Suspension 30 milliLiter(s) Oral every 4 hours PRN Dyspepsia  HYDROmorphone  Injectable 0.5 milliGRAM(s) IV Push every 4 hours PRN Severe Pain (7 - 10)  oxyCODONE    5 mG/acetaminophen 325 mG 1 Tablet(s) Oral every 4 hours PRN Moderate Pain (4 - 6)    	    Vital Signs Last 24 Hrs  T(C): 36.7 (21 Sep 2019 06:05), Max: 36.7 (21 Sep 2019 06:05)  T(F): 98 (21 Sep 2019 06:05), Max: 98 (21 Sep 2019 06:05)  HR: 70 (21 Sep 2019 06:05) (70 - 95)  BP: 125/59 (21 Sep 2019 06:05) (109/58 - 145/65)  BP(mean): 84 (20 Sep 2019 10:26) (84 - 84)  RR: 20 (21 Sep 2019 06:05) (16 - 20)  SpO2: 99% (21 Sep 2019 06:05) (97% - 100%)    I&O's Detail      PHYSICAL EXAM:  Appearance: Normal, well nourished	  HEENT:   Normal oral mucosa, PERRL, EOMI, sclera non-icteric	  Lymphatic: No cervical lymphadenopathy  Cardiovascular: Normal S1 S2, No JVD, No cardiac murmurs, No carotid bruits, No peripheral edema  Respiratory: Lungs clear to auscultation	  Psychiatry: A & O x 3, Mood & affect appropriate  Gastrointestinal:  Soft, Non-tender, + BS, no bruits	  Skin: No rashes, No ecchymoses, No cyanosis  Neurologic: Grossly non-focal with full strength in all four extremities  Extremities: Normal range of motion, No clubbing, cyanosis or edema  Vascular: Peripheral pulses palpable 2+ bilaterally      INTERPRETATION OF TELEMETRY:    ECG:    LABS:                        12.3   11.57 )-----------( 90       ( 21 Sep 2019 06:44 )             37.7     09-    136  |  101  |  14.0  ----------------------------<  123<H>  4.3   |  23.0  |  0.65    Ca    8.9      21 Sep 2019 06:44  Mg     2.1               PT/INR - ( 20 Sep 2019 12:37 )   PT: 10.9 sec;   INR: 0.95 ratio         PTT - ( 20 Sep 2019 12:37 )  PTT:29.1 sec    I&O's Summary    BNP

## 2019-09-21 NOTE — DISCHARGE NOTE NURSING/CASE MANAGEMENT/SOCIAL WORK - PATIENT PORTAL LINK FT
You can access the FollowMyHealth Patient Portal offered by [facility] by registering at the following website: [email or web address]. By joining [facility]’s FollowMyHealth portal, you will also be able to view your health information using other applications (apps) compatible with our system.

## 2019-09-22 PROCEDURE — 86901 BLOOD TYPING SEROLOGIC RH(D): CPT

## 2019-09-22 PROCEDURE — 36415 COLL VENOUS BLD VENIPUNCTURE: CPT

## 2019-09-22 PROCEDURE — 86850 RBC ANTIBODY SCREEN: CPT

## 2019-09-22 PROCEDURE — 71045 X-RAY EXAM CHEST 1 VIEW: CPT

## 2019-09-22 PROCEDURE — C1896: CPT

## 2019-09-22 PROCEDURE — 85027 COMPLETE CBC AUTOMATED: CPT

## 2019-09-22 PROCEDURE — 93005 ELECTROCARDIOGRAM TRACING: CPT

## 2019-09-22 PROCEDURE — C1722: CPT

## 2019-09-22 PROCEDURE — C1889: CPT

## 2019-09-22 PROCEDURE — 71046 X-RAY EXAM CHEST 2 VIEWS: CPT

## 2019-09-22 PROCEDURE — 83735 ASSAY OF MAGNESIUM: CPT

## 2019-09-22 PROCEDURE — 85610 PROTHROMBIN TIME: CPT

## 2019-09-22 PROCEDURE — 85730 THROMBOPLASTIN TIME PARTIAL: CPT

## 2019-09-22 PROCEDURE — 80048 BASIC METABOLIC PNL TOTAL CA: CPT

## 2019-09-22 PROCEDURE — 86923 COMPATIBILITY TEST ELECTRIC: CPT

## 2019-09-22 PROCEDURE — 86900 BLOOD TYPING SEROLOGIC ABO: CPT

## 2019-10-08 ENCOUNTER — APPOINTMENT (OUTPATIENT)
Dept: ELECTROPHYSIOLOGY | Facility: CLINIC | Age: 29
End: 2019-10-08
Payer: COMMERCIAL

## 2019-10-08 VITALS
SYSTOLIC BLOOD PRESSURE: 116 MMHG | HEIGHT: 69 IN | DIASTOLIC BLOOD PRESSURE: 77 MMHG | HEART RATE: 68 BPM | BODY MASS INDEX: 32.58 KG/M2 | WEIGHT: 220 LBS | OXYGEN SATURATION: 100 %

## 2019-10-08 PROCEDURE — 93000 ELECTROCARDIOGRAM COMPLETE: CPT | Mod: 59

## 2019-10-08 PROCEDURE — 93260 PRGRMG DEV EVAL IMPLTBL SYS: CPT

## 2019-10-09 NOTE — DISCUSSION/SUMMARY
[Patient] : the patient [FreeTextEntry1] : \par SICD implant site is healing well and WNL.  +AF events, burden 6% since implant\par 16 days with AF events recorded.   NO treated or untreated episodes.  \par Vector testing preformed by [facility] and remains programmed in primary vector.\par \par Known history of PAF with episodes of RVR hat precipitated VT which self-terminated \par Remains on Metoprolol 200mg which he is tolerating. \par Ultimately will plan for AF ablation > 6 weeks post implant.  \par \par Remote monitoring is being established \par Will start planning procedure for December. \par Follow-up with Dr. Barkley in 1 month to discuss AF ablation and periprocedural anticoagulation.\par Advised to f/up for genetic testing as previously advised by Dr. Black.\par \par Caitlin Salazar PAC\par \par \par

## 2019-10-09 NOTE — PHYSICAL EXAM
[Dry] : dry [Clean] : clean [Healing Well] : healing well [Warm] : not warm [Erythema] : not erythematous [Indurated] : not indurated [Tender] : not tender [FreeTextEntry1] : subxiphoid, midaxillary line [Fluctuant] : not fluctuant

## 2019-10-09 NOTE — HISTORY OF PRESENT ILLNESS
[de-identified] : 29 year old male with a history of cardiac arrest secondary to Ventricular Fibrillation during sleep status post- secondary prevention subcutaneous-implantable cardioverter defibrillator (by Dr. Black at [facility]) who has had multiple revisions now presenting for system extraction and re-implantation for high impedance alerts. He presented to [facility] with VF arrest requiring hypothermia protocol. \par He was subsequently transferred to [facility] where he had a cardiac MRI which revealed normal biventricular function and no LGE, with an LVEF of 59%. He underwent initial subcutaneous ICD implantation by Dr. Black on 12/3/13. He subsequently had genetic testing which was negative, though the report is unavailable.\par \par He then had premature battery depletion requiring generator replacement in 3/25/15. 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 Since that time he had inappropriate shocks after use of a TENS unit. He was later found to have lead artifact on his secondary sensing vector (10/2016) requiring reprogramming to the primary vector. He subsequently had an appropriate ICD shock for PMVT during sleep confirming adequate function of his device. Since that time, he has had episodes of paroxysmal AF precipitating VT requiring ICD shocks (8/2018).\par \par He recently presented to the office after he was told by the team at [facility] to see an electrophysiologist as soon as possible. Device interrogation revealed an episode of AF with RVR that precipitated VT \par which self-terminated. He had symptoms of palpitations and near syncope at the time. However, over the past several months since August of 2019, he has had alerts on his device of impedance out of range. The case was reviewed with subcutaneous ICD experts at [facility] and X-Ray imaging did not show any evidence of pin/header problem.\par \par Given need for secondary prevention of sudden cardiac arrest with concern for a malfunctioning system, he underwent extraction and implantation of a new SICD system.  Prior to procedure, discussed the option of undergoing transvenous system, but given his age and no significant pacing requirements, we decided to pursue repeat implantation of an S-ICD.\par \par Presents for post implant device and wound check.  NO complaints.  Denies syncope, near syncope or ICD shocks

## 2019-10-09 NOTE — PROCEDURE
[No] : not [NSR] : normal sinus rhythm [See Device Printout] : See device printout [ICD] : Implantable cardioverter-defibrillator [[employer]] : [facility] [Normal] : The battery status is normal. [de-identified] : [id number] [de-identified] : [id number]  [de-identified] : +AF events, burden 6% since implant\par 16 days with AF events  recorded\par \par NO treated or untreated episodes. \par Programmed in primary vector [de-identified] : 9/20/2019

## 2019-10-10 ENCOUNTER — APPOINTMENT (OUTPATIENT)
Dept: ELECTROPHYSIOLOGY | Facility: CLINIC | Age: 29
End: 2019-10-10

## 2019-10-14 ENCOUNTER — OTHER (OUTPATIENT)
Age: 29
End: 2019-10-14

## 2019-11-05 ENCOUNTER — APPOINTMENT (OUTPATIENT)
Dept: ELECTROPHYSIOLOGY | Facility: CLINIC | Age: 29
End: 2019-11-05
Payer: COMMERCIAL

## 2019-11-05 VITALS
HEART RATE: 73 BPM | WEIGHT: 220 LBS | HEIGHT: 69 IN | OXYGEN SATURATION: 100 % | BODY MASS INDEX: 32.58 KG/M2 | SYSTOLIC BLOOD PRESSURE: 109 MMHG | DIASTOLIC BLOOD PRESSURE: 74 MMHG

## 2019-11-05 PROCEDURE — 93000 ELECTROCARDIOGRAM COMPLETE: CPT | Mod: 59

## 2019-11-05 PROCEDURE — 99214 OFFICE O/P EST MOD 30 MIN: CPT | Mod: 25

## 2019-11-05 PROCEDURE — 93261 INTERROGATE SUBQ DEFIB: CPT

## 2019-11-05 NOTE — HISTORY OF PRESENT ILLNESS
[FreeTextEntry1] :  28 y/o M with pmhx VF arrest during sleep requiring hypothermia protocol s/p secondary prevention SICD (12/3/13, Dr. Black at [facility]) with subsequent premature battery depletion requiring generator replacement (3/25/15), SICD lead artifact requiring reprogramming vector reprogramming (10/2016), appropriate ICD shock for PMVT (2/2017), symptomatic paroxysmal AF precipitating VT requiring ICD shock (8/2018), recently found to have elevated impedance ICD wire s/p extraction and implantation of a new SICD system. \par Presents today for EP f/up and continues to complains of intermittent palpitations.  Planned for upcoming AF ablation in the setting of PAF w/ RVR which is symptomatic and could result in both inappropriate and appropriate ICD shocks (previously precipitated VT).  Denies CP, dizziness, syncope or recurrent ICD shocks\par \par Of note: Prior to initial SICD in 2013, patient had Cardiac MRI which revealed normal biventricular function and no LGE, with an LVEF of 59%. He subsequently had genetic testing which was negative, though the report is unavailable.\par

## 2019-11-05 NOTE — DISCUSSION/SUMMARY
[FreeTextEntry1] : 28 y/o M with pmhx VF arrest during sleep requiring hypothermia protocol s/p secondary prevention SICD (12/3/13, Dr. Black at [facility]), premature battery depletion requiring generator replacement (3/25/15), SICD lead artifact requiring reprogramming vector reprogramming (10/2016), appropriate ICD shock for PMVT (2/2017), symptomatic paroxysmal AF precipitating VT requiring ICD shock (8/2018), and elevated lead impedance s/p extraction and implantation of a new SICD system. \par Presents today for EP f/up and continues to complains of intermittent palpitations and associated SOB. Denies dizziness or syncope.  NO recent ICD shocks.  \par \par SICD shows normal function, Impedance WNL, and 7% AF burden.  Available EGM show evidence of improved rate control on increased beta blocker (Metoprolol 200mg).  Planned for upcoming AF ablation in the setting of PAF w/ RVR which is symptomatic and could result in both inappropriate and appropriate ICD shocks (previously precipitated VT).\par \par - Plan for AF ablation on 12/13/19 as scheduled. \par - Continue Metoprolol 200mg.   Start Eliquis 5mg BID 1 week prior to preprocedure NEVAEH/PST\par - [id number], will continue a/c for at least 2 months post ablation\par - Remote monitoring is established. \par - Advised to f/up for genetic testing.  Will contact genetic testing in [facility]. Will help arrange consult appointmnet\par \par Seen and d/w Dr. Barkley\par Caitlin Salazar PAC\par

## 2019-11-05 NOTE — REVIEW OF SYSTEMS
[Shortness Of Breath] : shortness of breath [Palpitations] : palpitations [Negative] : Heme/Lymph [Fever] : no fever [Chills] : no chills [Feeling Fatigued] : not feeling fatigued [Dyspnea on exertion] : not dyspnea during exertion [Chest  Pressure] : no chest pressure [Chest Pain] : no chest pain [Lower Ext Edema] : no extremity edema [Cough] : no cough [Dizziness] : no dizziness [Confusion] : no confusion was observed [Anxiety] : no anxiety

## 2019-11-05 NOTE — PHYSICAL EXAM
[General Appearance - Well Developed] : well developed [Normal Appearance] : normal appearance [General Appearance - Well Nourished] : well nourished [Normal Oropharynx] : normal oropharynx [Respiration, Rhythm And Depth] : normal respiratory rhythm and effort [Exaggerated Use Of Accessory Muscles For Inspiration] : no accessory muscle use [Auscultation Breath Sounds / Voice Sounds] : lungs were clear to auscultation bilaterally [Heart Rate And Rhythm] : heart rate and rhythm were normal [Heart Sounds] : normal S1 and S2 [Murmurs] : no murmurs present [Edema] : no peripheral edema present [Bowel Sounds] : normal bowel sounds [Abdomen Soft] : soft [Abnormal Walk] : normal gait [Nail Clubbing] : no clubbing of the fingernails [Skin Color & Pigmentation] : normal skin color and pigmentation [] : no rash [Oriented To Time, Place, And Person] : oriented to person, place, and time [No Anxiety] : not feeling anxious

## 2019-11-11 ENCOUNTER — APPOINTMENT (OUTPATIENT)
Dept: CARDIOLOGY | Facility: CLINIC | Age: 29
End: 2019-11-11
Payer: COMMERCIAL

## 2019-11-11 VITALS
HEART RATE: 59 BPM | DIASTOLIC BLOOD PRESSURE: 76 MMHG | BODY MASS INDEX: 32.58 KG/M2 | HEIGHT: 69 IN | SYSTOLIC BLOOD PRESSURE: 111 MMHG | WEIGHT: 220 LBS | OXYGEN SATURATION: 100 %

## 2019-11-11 DIAGNOSIS — Z86.74 PERSONAL HISTORY OF SUDDEN CARDIAC ARREST: ICD-10-CM

## 2019-11-11 PROCEDURE — 93000 ELECTROCARDIOGRAM COMPLETE: CPT

## 2019-11-11 PROCEDURE — 96040: CPT

## 2019-11-11 PROCEDURE — 99244 OFF/OP CNSLTJ NEW/EST MOD 40: CPT

## 2019-11-19 NOTE — FAMILY HISTORY
[FreeTextEntry1] : FamilyHistory_20_twCiteListControlStart FamilyHistory_20_twCiteListControlEnd [id number] \par A four-generation family history was constructed and scanned into [email or web address]. \par Family history is significant for his father age 60 is alive and well and he has a paternal aunt age 53 who is alive and jim.  his mother age 55 her cardiac hx unk and he has 2 maternal aunts who are alive and well\par his maternal grandmother hx afib s/p ablation and maternal grandfather age 83 -med hx unk\par his paternal grandfather dec 75 due to lung CA and a paternal grandmother age 82 hx of Br CA\par he has 1 brother and 1 sister both are alive and well and one child on the way\par Both his maternal and paternal families originate from [place] \par No Ashkenazi Hoahaoism ancestry. Family history was positive/negative for consenguinity  No family history of SIDS\par  \par \par

## 2019-11-19 NOTE — REVIEW OF SYSTEMS
[Cyanosis] : no cyanosis [Nl] : Neurological [NI] : Endocrine [Edema] : no ~T edema [Diaphoresis] : no diaphoresis [Chest Pain] : no chest pain [Exercise Intolerance] : no exercise intolerance [Palpitations] : palpitations

## 2019-11-19 NOTE — PHYSICAL EXAM
[Normal] : without joint laxity or contractures [Muscle tone/ strength] : muscle tone/ strength is normal [de-identified] : hearing grossly intact/normal

## 2019-11-19 NOTE — HISTORY OF PRESENT ILLNESS
[FreeTextEntry1] : Mr. JESS CRAMER  is a 29 year-old man PMH VF arrest/SCD, paroxysmal Afib, VT.  He states he was in his normal state of health until one morning 12/2013 he states he work up that morning when his girlfriend woke up, she left and he fell back asleep, his mother later went to check on him and found him lying unresponsive and purple.  She initiated CPR And  called 911, monitor reported VF and he was successfully defibrillated and underwent hypothermia protocol.  \par He does not remember other events earlier that morning.  He denies having an alarm clock set to wake him up.\par cMRI demonstrated nml biventriclar fxn, no LGE and a nml EF. he had an ICD placed for secondary prevention.\par His device has subsequently picked up further VT as well as pAF\par He states he had prior genetic testing for long QT syndrome in 2013 that was negative? but he does not have the report available and it is not know what method of testing was used or what genes were evaluated. \par Today he presents for a cardiogenomic evaluation

## 2019-11-19 NOTE — REASON FOR VISIT
[FreeTextEntry3] : JESS CRAMER  is being seen  for an initial consultation at the Cardiogenomics Program at [facility] on 11/11/2019.   Mr. CRAMER was referred by Dr Salazar and Dr Barkley for hereditary cardiac predisposition risk assessment and counseling, due to his Hx of cardiac arrest, SCD, Hx of  VT\par \par

## 2019-12-04 ENCOUNTER — OUTPATIENT (OUTPATIENT)
Dept: OUTPATIENT SERVICES | Facility: HOSPITAL | Age: 29
LOS: 1 days | End: 2019-12-04
Payer: COMMERCIAL

## 2019-12-04 VITALS
DIASTOLIC BLOOD PRESSURE: 60 MMHG | HEART RATE: 66 BPM | RESPIRATION RATE: 18 BRPM | HEIGHT: 69 IN | SYSTOLIC BLOOD PRESSURE: 115 MMHG | OXYGEN SATURATION: 98 % | WEIGHT: 229.94 LBS | TEMPERATURE: 97 F

## 2019-12-04 DIAGNOSIS — Z95.810 PRESENCE OF AUTOMATIC (IMPLANTABLE) CARDIAC DEFIBRILLATOR: Chronic | ICD-10-CM

## 2019-12-04 DIAGNOSIS — I48.0 PAROXYSMAL ATRIAL FIBRILLATION: ICD-10-CM

## 2019-12-04 LAB
ANION GAP SERPL CALC-SCNC: 13 MMOL/L — SIGNIFICANT CHANGE UP (ref 5–17)
APTT BLD: 30.7 SEC — SIGNIFICANT CHANGE UP (ref 27.5–36.3)
BLD GP AB SCN SERPL QL: SIGNIFICANT CHANGE UP
BUN SERPL-MCNC: 17 MG/DL — SIGNIFICANT CHANGE UP (ref 8–20)
CALCIUM SERPL-MCNC: 9 MG/DL — SIGNIFICANT CHANGE UP (ref 8.6–10.2)
CHLORIDE SERPL-SCNC: 103 MMOL/L — SIGNIFICANT CHANGE UP (ref 98–107)
CO2 SERPL-SCNC: 26 MMOL/L — SIGNIFICANT CHANGE UP (ref 22–29)
COMBINED CARDIAC PANEL: ABNORMAL
CREAT SERPL-MCNC: 0.7 MG/DL — SIGNIFICANT CHANGE UP (ref 0.5–1.3)
GLUCOSE SERPL-MCNC: 93 MG/DL — SIGNIFICANT CHANGE UP (ref 70–115)
HCT VFR BLD CALC: 40.1 % — SIGNIFICANT CHANGE UP (ref 39–50)
HGB BLD-MCNC: 13.1 G/DL — SIGNIFICANT CHANGE UP (ref 13–17)
INR BLD: 0.97 RATIO — SIGNIFICANT CHANGE UP (ref 0.88–1.16)
MAGNESIUM SERPL-MCNC: 2.1 MG/DL — SIGNIFICANT CHANGE UP (ref 1.6–2.6)
MCHC RBC-ENTMCNC: 28.3 PG — SIGNIFICANT CHANGE UP (ref 27–34)
MCHC RBC-ENTMCNC: 32.7 GM/DL — SIGNIFICANT CHANGE UP (ref 32–36)
MCV RBC AUTO: 86.6 FL — SIGNIFICANT CHANGE UP (ref 80–100)
PLATELET # BLD AUTO: 62 K/UL — LOW (ref 150–400)
POTASSIUM SERPL-MCNC: 4.1 MMOL/L — SIGNIFICANT CHANGE UP (ref 3.5–5.3)
POTASSIUM SERPL-SCNC: 4.1 MMOL/L — SIGNIFICANT CHANGE UP (ref 3.5–5.3)
PROTHROM AB SERPL-ACNC: 11.2 SEC — SIGNIFICANT CHANGE UP (ref 10–12.9)
RBC # BLD: 4.63 M/UL — SIGNIFICANT CHANGE UP (ref 4.2–5.8)
RBC # FLD: 13 % — SIGNIFICANT CHANGE UP (ref 10.3–14.5)
SODIUM SERPL-SCNC: 142 MMOL/L — SIGNIFICANT CHANGE UP (ref 135–145)
WBC # BLD: 4.3 K/UL — SIGNIFICANT CHANGE UP (ref 3.8–10.5)
WBC # FLD AUTO: 4.3 K/UL — SIGNIFICANT CHANGE UP (ref 3.8–10.5)

## 2019-12-04 PROCEDURE — 83735 ASSAY OF MAGNESIUM: CPT

## 2019-12-04 PROCEDURE — 85027 COMPLETE CBC AUTOMATED: CPT

## 2019-12-04 PROCEDURE — 86901 BLOOD TYPING SEROLOGIC RH(D): CPT

## 2019-12-04 PROCEDURE — 85610 PROTHROMBIN TIME: CPT

## 2019-12-04 PROCEDURE — 80048 BASIC METABOLIC PNL TOTAL CA: CPT

## 2019-12-04 PROCEDURE — 86900 BLOOD TYPING SEROLOGIC ABO: CPT

## 2019-12-04 PROCEDURE — 93005 ELECTROCARDIOGRAM TRACING: CPT

## 2019-12-04 PROCEDURE — 85730 THROMBOPLASTIN TIME PARTIAL: CPT

## 2019-12-04 PROCEDURE — 86850 RBC ANTIBODY SCREEN: CPT

## 2019-12-04 PROCEDURE — 36415 COLL VENOUS BLD VENIPUNCTURE: CPT

## 2019-12-04 PROCEDURE — 93010 ELECTROCARDIOGRAM REPORT: CPT

## 2019-12-04 RX ORDER — ENOXAPARIN SODIUM 100 MG/ML
100 INJECTION SUBCUTANEOUS
Qty: 2 | Refills: 0
Start: 2019-12-04 | End: 2019-12-04

## 2019-12-04 RX ORDER — APIXABAN 2.5 MG/1
5 TABLET, FILM COATED ORAL ONCE
Refills: 0 | Status: COMPLETED | OUTPATIENT
Start: 2019-12-04 | End: 2019-12-04

## 2019-12-04 RX ORDER — APIXABAN 2.5 MG/1
5 TABLET, FILM COATED ORAL EVERY 12 HOURS
Refills: 0 | Status: DISCONTINUED | OUTPATIENT
Start: 2019-12-04 | End: 2019-12-04

## 2019-12-04 RX ADMIN — APIXABAN 5 MILLIGRAM(S): 2.5 TABLET, FILM COATED ORAL at 11:59

## 2019-12-04 NOTE — H&P PST ADULT - NSICDXPASTMEDICALHX_GEN_ALL_CORE_FT
PAST MEDICAL HISTORY:  Atrial fibrillation     History of ventricular fibrillation     Obstructive sleep apnea

## 2019-12-04 NOTE — H&P PST ADULT - NSICDXPASTSURGICALHX_GEN_ALL_CORE_FT
PAST SURGICAL HISTORY:  S/P ICD (internal cardiac defibrillator) procedure SQ ICD s/p 4 revisions last 9/2019

## 2019-12-04 NOTE — H&P PST ADULT - OTHER CARE PROVIDERS
Cardio: Cardio: Dr Barkley              Genetic at [place]: Clotilde Sanchez [facility] and Isiah Ramos

## 2019-12-04 NOTE — PROGRESS NOTE ADULT - SUBJECTIVE AND OBJECTIVE BOX
Pt presented today for PST and pre AF ablation NEVAEH in anticipation of AF ablation 12/13/19.  Platelets today 62. On prior admission platelets were 76 and 90.  Pt recommended to have hematology consult for thrombocytopenia on previous admission.  He has not seen hematology yet.    Will defer NEVAEH and AF ablation until work up of thrombocytopenia complete, as pt will require NOAC with lovenox bridging and intraoperative heparin.  Instructions reviewed with pt, all questions answered by Dr Barkley.  Referral with phone number and address given for Dr Cody Watkins-Hematology [phone]. [address], [address], [place] [address].    Pt will follow up with Dr Barkley after above complete

## 2019-12-04 NOTE — H&P PST ADULT - ASSESSMENT
28 y/o M with pmhx of secondary prevention SICD and new onset pAF (              ) who presents for elective PST and pre ablation NEVAEH in anticipation of AF ablation (                  ). Planned for upcoming AF ablation in the setting of pAF w/ RVR which is symptomatic and could result in both inappropriate and appropriate ICD shocks (previously precipitated VT).    EP history: VF arrest during sleep requiring hypothermia protocol s/p secondary prevention SICD (12/3/13, Dr. Black at [facility]), premature battery depletion requiring generator replacement (3/25/15), SICD lead artifact requiring reprogramming vector reprogramming (10/2016), appropriate ICD shock for PMVT (2/2017), symptomatic paroxysmal AF precipitating VT requiring ICD shock (8/2018), and elevated lead impedance s/p extraction and implantation of a new SICD system (           Office follow up 11/5/19: SICD shows normal function, Impedance WNL, and 7% AF burden. Available EGM show evidence of improved rate control on increased beta blocker (Metoprolol 200mg). Planned for upcoming AF ablation in the setting of pAF w/ RVR which is symptomatic and could result in both inappropriate and appropriate ICD shocks (previously precipitated VT)    - Plan for AF ablation on 12/13/19 as scheduled.   - Continue Metoprolol 200mg. Start Eliquis 5mg BID 1 week prior to preprocedure NEVAEH/PST  - [id number] 0, will continue a/c for at least 2 months post ablation  - Remote monitoring is established.   - Advised to f/up for genetic testing. Will contact genetic testing in [facility]. Will help arrange consult appointmnet 28 y/o M with pmhx of secondary prevention SICD and pAF (CHADSVASc- 0) who presents for elective PST and pre AF- ablation NEVAEH in anticipation of AF ablation scheduled for 12/13/2019. Planned for upcoming AF ablation in the setting of pAF w/ RVR which is symptomatic and could result in both inappropriate and appropriate ICD shocks (previously precipitated VT).    For NEVAEH today:  - keep npo  - stat eliquis po 5mg x 1 and then continue q12hr until ablation instructions with lovenox bridge as below. Pt picked up prescription for eliquis but was unclear when to start medication. Importance of anticoagulation pre and post ablation reviewed, pt understands and is agreeable to anticoagulation plan.    - [id number] 0, will continue a/c for at least 2 months post ablation     For AF ablation scheduled for 12/13/19:  - npo after midnight prior to procedure  - MICHELLE compliance reviewed.  - last dose of eliquis 12/11/2019, lovenox injection 12/12/19 at 8am and 8pm, prescription electronically sent  - RN to provide lovenox teaching prior to discharge today 12/4/19

## 2019-12-04 NOTE — H&P PST ADULT - HISTORY OF PRESENT ILLNESS
30 y/o M with pmhx of secondary prevention SICD and new onset pAF (              ) who presents for elective PST and pre ablation NEVAEH in anticipation of AF ablation (                  ). Planned for upcoming AF ablation in the setting of pAF w/ RVR which is symptomatic and could result in both inappropriate and appropriate ICD shocks (previously precipitated VT).  EP history: VF arrest during sleep requiring hypothermia protocol s/p secondary prevention SICD (12/3/13, Dr. Black at [facility]), premature battery depletion requiring generator replacement (3/25/15), SICD lead artifact requiring reprogramming vector reprogramming (10/2016), appropriate ICD shock for PMVT (2017), symptomatic paroxysmal AF precipitating VT requiring ICD shock (2018), and elevated lead impedance s/p extraction and implantation of a new SICD system (2019).    Office follow up 19: SICD shows normal function, Impedance WNL, and 7% AF burden. Available EGM show evidence of improved rate control on increased beta blocker (Metoprolol 200mg).     EK2019, SR with APCS and 4 beat run of ?PAF. Normal axis and intervals   Stress Test: 3/20/2017, no Ischemia, no Symptoms, NCT and WCT noted in recovery suggestive of SVT and NSVT   Cardiac MRI: 2013, normal biventricular function, NO LGE 30 y/o M with pmhx of MICHELLE, secondary prevention SICD and pAF who presents for elective PST and pre AF ablation NEVAEH in anticipation of AF ablation scheduled for 19. Planned for upcoming AF ablation in the setting of pAF w/ RVR which is symptomatic and could result in both inappropriate and appropriate ICD shocks (previously precipitated VT).  EP history: VF arrest during sleep requiring hypothermia protocol s/p secondary prevention SICD (12/3/13, Dr. Black at [facility]), premature battery depletion requiring generator replacement (3/25/15), SICD lead artifact requiring reprogramming vector reprogramming (10/2016), appropriate ICD shock for PMVT (2017), symptomatic paroxysmal AF precipitating VT requiring ICD shock (2018), and elevated lead impedance s/p extraction and implantation of a new SICD system (2019). Pt saw genetic specialist at [facility], genetic testing results are pending.  Office follow up 19: SICD shows normal function, Impedance WNL, and 7% AF burden. Available EGM show evidence of improved rate control on increased beta blocker (Metoprolol 200mg).     EK2019, SR with APCS and 4 beat run of ?PAF. Normal axis and intervals   Stress Test: 3/20/2017, no Ischemia, no Symptoms, NCT and WCT noted in recovery suggestive of SVT and NSVT   Cardiac MRI: 2013, normal biventricular function, NO LGE

## 2020-01-06 ENCOUNTER — NON-APPOINTMENT (OUTPATIENT)
Age: 30
End: 2020-01-06

## 2020-01-06 ENCOUNTER — APPOINTMENT (OUTPATIENT)
Dept: CARDIOLOGY | Facility: CLINIC | Age: 30
End: 2020-01-06
Payer: COMMERCIAL

## 2020-01-06 VITALS
WEIGHT: 247 LBS | DIASTOLIC BLOOD PRESSURE: 79 MMHG | BODY MASS INDEX: 36.48 KG/M2 | OXYGEN SATURATION: 100 % | SYSTOLIC BLOOD PRESSURE: 123 MMHG | HEART RATE: 73 BPM

## 2020-01-06 PROCEDURE — 99212 OFFICE O/P EST SF 10 MIN: CPT

## 2020-01-06 PROCEDURE — 93000 ELECTROCARDIOGRAM COMPLETE: CPT

## 2020-01-06 PROCEDURE — 96040: CPT

## 2020-01-08 PROBLEM — I48.91 UNSPECIFIED ATRIAL FIBRILLATION: Chronic | Status: ACTIVE | Noted: 2019-12-04

## 2020-01-09 NOTE — HISTORY OF PRESENT ILLNESS
[FreeTextEntry1] : Mr. JESS CRAMER  is a 29 year-old man PMH VF arrest/SCD, paroxysmal Afib, VT.  He states he was in his normal state of health until one morning 12/2013 he states he work up that morning when his girlfriend woke up, she left and he fell back asleep, his mother later went to check on him and found him lying unresponsive and purple.  She initiated CPR And  called 911, monitor reported VF and he was successfully defibrillated and underwent hypothermia protocol.  \par He does not remember other events earlier that morning.  He denies having an alarm clock set to wake him up.\par cMRI demonstrated nml biventriclar fxn, no LGE and a nml EF. he had an ICD placed for secondary prevention.\par His device has subsequently picked up further VT as well as pAF\par He states he had prior genetic testing for long QT syndrome in 2013 that was negative? but he does not have the report available and it is not know what method of testing was used or what genes were evaluated. \par he underwent genetic testing and today presents for results

## 2020-01-09 NOTE — REASON FOR VISIT
[FreeTextEntry3] : JESS CRAMER  was initially seen for consultation at the [facility] at [facility] on 11/11/2019.   Mr. CRAMER was referred by Dr Salazar and Dr Barkley for hereditary cardiac predisposition risk assessment and counseling, due to his Hx of cardiac arrest, SCD, Hx of  VT\par \par

## 2020-01-09 NOTE — REVIEW OF SYSTEMS
[Nl] : Neurological [NI] : Endocrine [Palpitations] : palpitations [Cyanosis] : no cyanosis [Edema] : no ~T edema [Diaphoresis] : no diaphoresis [Chest Pain] : no chest pain [Exercise Intolerance] : no exercise intolerance

## 2020-01-09 NOTE — FAMILY HISTORY
[FreeTextEntry1] : FamilyHistory_20_twCiteListControlStart FamilyHistory_20_twCiteListControlEnd [id number] \par A four-generation family history was constructed and scanned into [employer]. \par Family history is significant for his father age 60 is alive and well and he has a paternal aunt age 53 who is alive and jim.  his mother age 55 her cardiac hx unk and he has 2 maternal aunts who are alive and well\par his maternal grandmother hx afib s/p ablation and maternal grandfather age 83 -med hx unk\par his paternal grandfather dec 75 due to lung CA and a paternal grandmother age 82 hx of Br CA\par he has 1 brother and 1 sister both are alive and well and one child on the way\par Both his maternal and paternal families originate from [place] \par No Ashkenazi Congregational ancestry. Family history was negative for consenguinity  No family history of SIDS\par  \par \par

## 2020-01-09 NOTE — PHYSICAL EXAM
[Normal] : normal palmar creases without syndactyly or other anomalies [Muscle tone/ strength] : muscle tone/ strength is normal [de-identified] : hearing grossly intact/normal

## 2020-01-15 ENCOUNTER — OUTPATIENT (OUTPATIENT)
Dept: OUTPATIENT SERVICES | Facility: HOSPITAL | Age: 30
LOS: 1 days | End: 2020-01-15
Payer: COMMERCIAL

## 2020-01-15 ENCOUNTER — TRANSCRIPTION ENCOUNTER (OUTPATIENT)
Age: 30
End: 2020-01-15

## 2020-01-15 VITALS
HEART RATE: 64 BPM | SYSTOLIC BLOOD PRESSURE: 107 MMHG | OXYGEN SATURATION: 97 % | HEIGHT: 69 IN | WEIGHT: 240.08 LBS | RESPIRATION RATE: 18 BRPM | DIASTOLIC BLOOD PRESSURE: 65 MMHG

## 2020-01-15 DIAGNOSIS — Z95.810 PRESENCE OF AUTOMATIC (IMPLANTABLE) CARDIAC DEFIBRILLATOR: Chronic | ICD-10-CM

## 2020-01-15 DIAGNOSIS — I48.0 PAROXYSMAL ATRIAL FIBRILLATION: ICD-10-CM

## 2020-01-15 LAB
ANION GAP SERPL CALC-SCNC: 11 MMOL/L — SIGNIFICANT CHANGE UP (ref 5–17)
APTT BLD: 34.4 SEC — SIGNIFICANT CHANGE UP (ref 27.5–36.3)
BLD GP AB SCN SERPL QL: SIGNIFICANT CHANGE UP
BUN SERPL-MCNC: 11 MG/DL — SIGNIFICANT CHANGE UP (ref 8–20)
CALCIUM SERPL-MCNC: 8.8 MG/DL — SIGNIFICANT CHANGE UP (ref 8.6–10.2)
CHLORIDE SERPL-SCNC: 104 MMOL/L — SIGNIFICANT CHANGE UP (ref 98–107)
CO2 SERPL-SCNC: 23 MMOL/L — SIGNIFICANT CHANGE UP (ref 22–29)
CREAT SERPL-MCNC: 0.66 MG/DL — SIGNIFICANT CHANGE UP (ref 0.5–1.3)
GLUCOSE SERPL-MCNC: 88 MG/DL — SIGNIFICANT CHANGE UP (ref 70–115)
HCT VFR BLD CALC: 39.3 % — SIGNIFICANT CHANGE UP (ref 39–50)
HGB BLD-MCNC: 12.6 G/DL — LOW (ref 13–17)
INR BLD: 1.14 RATIO — SIGNIFICANT CHANGE UP (ref 0.88–1.16)
MAGNESIUM SERPL-MCNC: 2.1 MG/DL — SIGNIFICANT CHANGE UP (ref 1.6–2.6)
MCHC RBC-ENTMCNC: 27.8 PG — SIGNIFICANT CHANGE UP (ref 27–34)
MCHC RBC-ENTMCNC: 32.1 GM/DL — SIGNIFICANT CHANGE UP (ref 32–36)
MCV RBC AUTO: 86.8 FL — SIGNIFICANT CHANGE UP (ref 80–100)
PLATELET # BLD AUTO: 37 K/UL — LOW (ref 150–400)
POTASSIUM SERPL-MCNC: 4 MMOL/L — SIGNIFICANT CHANGE UP (ref 3.5–5.3)
POTASSIUM SERPL-SCNC: 4 MMOL/L — SIGNIFICANT CHANGE UP (ref 3.5–5.3)
PROTHROM AB SERPL-ACNC: 13.2 SEC — HIGH (ref 10–12.9)
RBC # BLD: 4.53 M/UL — SIGNIFICANT CHANGE UP (ref 4.2–5.8)
RBC # FLD: 12.8 % — SIGNIFICANT CHANGE UP (ref 10.3–14.5)
SODIUM SERPL-SCNC: 138 MMOL/L — SIGNIFICANT CHANGE UP (ref 135–145)
WBC # BLD: 3.22 K/UL — LOW (ref 3.8–10.5)
WBC # FLD AUTO: 3.22 K/UL — LOW (ref 3.8–10.5)

## 2020-01-15 PROCEDURE — 83735 ASSAY OF MAGNESIUM: CPT

## 2020-01-15 PROCEDURE — 86850 RBC ANTIBODY SCREEN: CPT

## 2020-01-15 PROCEDURE — 86901 BLOOD TYPING SEROLOGIC RH(D): CPT

## 2020-01-15 PROCEDURE — 80048 BASIC METABOLIC PNL TOTAL CA: CPT

## 2020-01-15 PROCEDURE — 93005 ELECTROCARDIOGRAM TRACING: CPT

## 2020-01-15 PROCEDURE — 93010 ELECTROCARDIOGRAM REPORT: CPT

## 2020-01-15 PROCEDURE — 86900 BLOOD TYPING SEROLOGIC ABO: CPT

## 2020-01-15 PROCEDURE — 85027 COMPLETE CBC AUTOMATED: CPT

## 2020-01-15 PROCEDURE — 85730 THROMBOPLASTIN TIME PARTIAL: CPT

## 2020-01-15 PROCEDURE — 85610 PROTHROMBIN TIME: CPT

## 2020-01-15 PROCEDURE — 36415 COLL VENOUS BLD VENIPUNCTURE: CPT

## 2020-01-15 RX ORDER — APIXABAN 2.5 MG/1
1 TABLET, FILM COATED ORAL
Qty: 0 | Refills: 0 | DISCHARGE

## 2020-01-15 NOTE — CHART NOTE - NSCHARTNOTEFT_GEN_A_CORE
Pt presented today 1/15/20 for PST and elective NEVAEH in anticipation of AF ablation 1/22/20.  Platelets on arrival are 37. WBC 3.22.    NEVAEH and ablation canceled.    Briefly 30 yo male with pmhx VF arrest during sleep requiring hypothermia protocol s/p secondary prevention S-ICD (12/3/13-[facility]), premature battery depletion requiring generator replacement 3/25/15, S-ICD lead artifact requiring reprogramming vector reprogramming 10/2016, appropriate ICD shock for PMVT 2/2017, symptomatic pAF precipitating VT requiring ICD shock 8/2018, elevated lead impedance s/p system extraction and reimplantation 9/20/2019-Dr Barkley.  Pt was scheduled for PST/NEVAEH 12/4/19 which was canceled due to thrombocytopenia-platelets 67. He saw hematology- [facility] and was treated with oral steroids for ITP. Reported outpt platelets >200.  Procedures were rescheduled for today as above. Pt started eliquis 5 mg po bid on 1/13/20. Platelets today 37, WBC 3.22.    Plan:  - cancel NEVAEH/ AF ablation  - discontinue eliquis  - follow up hematology this week. H/H 12.6/39.3 stable without s/sx of bleeding. Continued work up and treatment of thrombocytopenia and leukopenia on todays CBC. Pt should have more then one repeat stable platelet count on eliquis prior to consideration of this elective procedure.    Above recommendations and instructions reviewed with patient, all questions answered.  Pt aware that if s/sx of bleeding ie. from gums, epistaxis, blood in stool, lightheadedness/dizziness, syncope pt should report to ER    DW Dr Barkley, agrees

## 2020-01-15 NOTE — H&P PST ADULT - NSICDXPASTMEDICALHX_GEN_ALL_CORE_FT
PAST MEDICAL HISTORY:  Atrial fibrillation     History of ITP 12/2019 s/p oral steroid tx with normalization of platelet count    History of ventricular fibrillation last ICD shock 8/2019    Obstructive sleep apnea CPAP

## 2020-01-15 NOTE — DISCHARGE NOTE PROVIDER - NSDCCPCAREPLAN_GEN_ALL_CORE_FT
PRINCIPAL DISCHARGE DIAGNOSIS  Diagnosis: Thrombocytopenia  Assessment and Plan of Treatment: You should follow up with your hematologist this week for further work up of recurrent thrombocytopenia. [phone].  Once platelets are stable off steroids for greater then one month you should scheudle an oupt follow up with Dr Barkley for discussion of AF ablation.  DISCONTINUE ELIQUIS

## 2020-01-15 NOTE — DISCHARGE NOTE PROVIDER - HOSPITAL COURSE
Pt with hx of ITP s/p recent oral steroid taper who presented for elective NEVAEH preparation for elective AF ablation.    Platelets today 37 WBC 3.22    NEVAEH and ablation canceled.    Pt will follow up with his hematologist this week [facility] [phone]

## 2020-01-15 NOTE — DISCHARGE NOTE PROVIDER - NSDCQMSTROKE_NEU_ALL_CORE
Patient instructed to follow all orders given by MD for NPO status and meds to hold  or take prior to procedure on 5/9/19. Teaching reinforced re:antibacterial shower, arrival time, department location/ parking, need for ride home, leave valuables at home , wear clean, comfortable clothes and to bring overnight bag, insurance and photo cards. Verbalizes understanding.Pace watch device notification form faxed to [employer].   No

## 2020-01-15 NOTE — DISCHARGE NOTE PROVIDER - CARE PROVIDER_API CALL
Aydin Barkley)  Cardiology; Internal Medicine  [address] [place] [address], [place] [address]  Phone: [phone]  Fax: [phone]  Follow Up Time: 1 month

## 2020-01-15 NOTE — DISCHARGE NOTE NURSING/CASE MANAGEMENT/SOCIAL WORK - PATIENT PORTAL LINK FT
You can access the FollowMyHealth Patient Portal offered by [facility] by registering at the following website: [email or web address]. By joining [facility]’s FollowMyHealth portal, you will also be able to view your health information using other applications (apps) compatible with our system.

## 2020-02-03 ENCOUNTER — RX RENEWAL (OUTPATIENT)
Age: 30
End: 2020-02-03

## 2020-02-03 PROBLEM — Z86.79 PERSONAL HISTORY OF OTHER DISEASES OF THE CIRCULATORY SYSTEM: Chronic | Status: ACTIVE | Noted: 2019-09-20

## 2020-02-03 PROBLEM — G47.33 OBSTRUCTIVE SLEEP APNEA (ADULT) (PEDIATRIC): Chronic | Status: ACTIVE | Noted: 2019-09-20

## 2020-02-03 PROBLEM — Z86.2 PERSONAL HISTORY OF DISEASES OF THE BLOOD AND BLOOD-FORMING ORGANS AND CERTAIN DISORDERS INVOLVING THE IMMUNE MECHANISM: Chronic | Status: ACTIVE | Noted: 2020-01-15

## 2020-02-24 ENCOUNTER — RX RENEWAL (OUTPATIENT)
Age: 30
End: 2020-02-24

## 2020-02-25 ENCOUNTER — APPOINTMENT (OUTPATIENT)
Dept: ELECTROPHYSIOLOGY | Facility: CLINIC | Age: 30
End: 2020-02-25
Payer: COMMERCIAL

## 2020-02-25 ENCOUNTER — NON-APPOINTMENT (OUTPATIENT)
Age: 30
End: 2020-02-25

## 2020-02-25 VITALS
SYSTOLIC BLOOD PRESSURE: 119 MMHG | HEART RATE: 73 BPM | BODY MASS INDEX: 34.07 KG/M2 | DIASTOLIC BLOOD PRESSURE: 84 MMHG | WEIGHT: 230 LBS | OXYGEN SATURATION: 99 % | HEIGHT: 69 IN

## 2020-02-25 PROCEDURE — 99214 OFFICE O/P EST MOD 30 MIN: CPT | Mod: 25

## 2020-02-25 PROCEDURE — 93000 ELECTROCARDIOGRAM COMPLETE: CPT

## 2020-02-25 NOTE — DISCUSSION/SUMMARY
[FreeTextEntry1] : Dio Venegas is a 29 year old male with history of VF arrest s/p secondary prevention S-ICD (requiring multiple generator replacements and ICD system replacement in late 2019), ITP and paroxysmal AF for which he now presents for follow up.\par \par We again discussed management options for AF. Since he has had inappropriate shocks for AF with RVR and is symptomatic with palpitations, I believe he would benefit with AF ablation. Additionally, it is unknown what the long term effects of AF will be especially given his young age.\par \par I discussed again the risks, benefits and alternatives to catheter ablation. I believe this is the most effective way to manage his AF. We discussed the higher theoretical risk of perforation with catheter ablation on chronic steroid therapy, but there is not much literature recommending avoiding ablation on steroid therapy. After reviewing the options, Dio preferred to proceed with catheter ablation of AF.\par \par The risks of catheter ablation were described in detail and include, but are not limited to: pain, bleeding, infection, vascular injury, cardiac perforation and tamponade with potential for requiring open heart surgery, complete heart block requiring permanent pacing, phrenic nerve injury and diaphragmatic paralysis, atrioesophageal fistula, pulmonary vein stenosis, radiation-induced injury, death, heart attack, or stroke, of which the overall rates of occurrence range from 0.1-4%. The patient expressed understanding and was provided the opportunity to ask questions, of which all were answered to the patient's satisfaction.\par \par Recommendations:\par - Catheter ablation of AF\par - Start Apixaban, hold 1 day before procedure, bridge with Lovenox\par - NEVAEH before ablation to rule out LA/ECTOR thrombus\par - Continue Metoprolol Succinate\par \par Aydin Barkley MD\par Clinical Cardiac Electrophysiology

## 2020-02-25 NOTE — HISTORY OF PRESENT ILLNESS
[FreeTextEntry1] : Dio Venegas is a 29 year old male with history of VF arrest s/p secondary prevention S-ICD (requiring multiple generator replacements and ICD system replacement in late 2019) and paroxysmal AF for which he now presents for follow up.\par \par He initially had S-ICD implanted 12/3/13 by Dr. Black at [facility]. He had genetic testing which was negative for any clear inheritable syndromes. He has been diagnosed with symptomatic AF in 8/2018 and has had inappropriate ICD shocks. He has recently been started on maximum dose Metoprolol with improvement in rate control. He was planned for AF ablation but during presurgical testing he was noted to be thrombocytopenic. 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 He is being treated by Dr. Lesvia Ye. He is currently on 50mg of Prednisone daily and last platelet count was 181K.\par \par Today, he feels well. He states that on occasion he gets palpitations but it does not occur often. He denies any chest pain, dyspnea on exertion, orthopnea, paroxysmal nocturnal dyspnea, peripheral edema, lightheadedness, dizziness, syncope, nausea, vomiting, diaphoresis, melena, hematochezia, or hematemesis.

## 2020-02-25 NOTE — REASON FOR VISIT
[Follow-Up - Clinic] : a clinic follow-up of [AICD Check] : implantable cardioverter-defibrillator [Atrial Fibrillation] : atrial fibrillation [FreeTextEntry1] : C

## 2020-02-25 NOTE — PHYSICAL EXAM
[General Appearance - Well Developed] : well developed [Normal Appearance] : normal appearance [General Appearance - Well Nourished] : well nourished [Normal Conjunctiva] : the conjunctiva exhibited no abnormalities [Eyelids - No Xanthelasma] : the eyelids demonstrated no xanthelasmas [Normal Oral Mucosa] : normal oral mucosa [Normal Oropharynx] : normal oropharynx [Normal Jugular Venous V Waves Present] : normal jugular venous V waves present [Heart Rate And Rhythm] : heart rate and rhythm were normal [Respiration, Rhythm And Depth] : normal respiratory rhythm and effort [Auscultation Breath Sounds / Voice Sounds] : lungs were clear to auscultation bilaterally [Heart Sounds] : normal S1 and S2 [Murmurs] : no murmurs present [Edema] : no peripheral edema present [Abdomen Soft] : soft [Bowel Sounds] : normal bowel sounds [Abdomen Tenderness] : non-tender [Abnormal Walk] : normal gait [Nail Clubbing] : no clubbing of the fingernails [Cyanosis, Localized] : no localized cyanosis [Skin Color & Pigmentation] : normal skin color and pigmentation [Skin Turgor] : normal skin turgor [] : no rash

## 2020-02-28 ENCOUNTER — INPATIENT (INPATIENT)
Facility: HOSPITAL | Age: 30
LOS: 4 days | Discharge: HOME | DRG: 274 | End: 2020-03-04
Attending: INTERNAL MEDICINE | Admitting: INTERNAL MEDICINE
Payer: COMMERCIAL

## 2020-02-28 VITALS
HEART RATE: 76 BPM | SYSTOLIC BLOOD PRESSURE: 100 MMHG | TEMPERATURE: 98 F | DIASTOLIC BLOOD PRESSURE: 60 MMHG | RESPIRATION RATE: 16 BRPM | HEIGHT: 69 IN | WEIGHT: 229.94 LBS | OXYGEN SATURATION: 100 %

## 2020-02-28 DIAGNOSIS — Z95.810 PRESENCE OF AUTOMATIC (IMPLANTABLE) CARDIAC DEFIBRILLATOR: ICD-10-CM

## 2020-02-28 DIAGNOSIS — Z95.810 PRESENCE OF AUTOMATIC (IMPLANTABLE) CARDIAC DEFIBRILLATOR: Chronic | ICD-10-CM

## 2020-02-28 LAB
ALBUMIN SERPL ELPH-MCNC: 4.5 G/DL — SIGNIFICANT CHANGE UP (ref 3.3–5.2)
ALP SERPL-CCNC: 58 U/L — SIGNIFICANT CHANGE UP (ref 40–120)
ALT FLD-CCNC: 30 U/L — SIGNIFICANT CHANGE UP
ANION GAP SERPL CALC-SCNC: 13 MMOL/L — SIGNIFICANT CHANGE UP (ref 5–17)
AST SERPL-CCNC: 25 U/L — SIGNIFICANT CHANGE UP
BASOPHILS # BLD AUTO: 0.02 K/UL — SIGNIFICANT CHANGE UP (ref 0–0.2)
BASOPHILS NFR BLD AUTO: 0.3 % — SIGNIFICANT CHANGE UP (ref 0–2)
BILIRUB SERPL-MCNC: 1.1 MG/DL — SIGNIFICANT CHANGE UP (ref 0.4–2)
BUN SERPL-MCNC: 11 MG/DL — SIGNIFICANT CHANGE UP (ref 8–20)
CALCIUM SERPL-MCNC: 9.4 MG/DL — SIGNIFICANT CHANGE UP (ref 8.6–10.2)
CHLORIDE SERPL-SCNC: 104 MMOL/L — SIGNIFICANT CHANGE UP (ref 98–107)
CO2 SERPL-SCNC: 28 MMOL/L — SIGNIFICANT CHANGE UP (ref 22–29)
CREAT SERPL-MCNC: 0.78 MG/DL — SIGNIFICANT CHANGE UP (ref 0.5–1.3)
EOSINOPHIL # BLD AUTO: 0.08 K/UL — SIGNIFICANT CHANGE UP (ref 0–0.5)
EOSINOPHIL NFR BLD AUTO: 1.2 % — SIGNIFICANT CHANGE UP (ref 0–6)
GLUCOSE SERPL-MCNC: 46 MG/DL — CRITICAL LOW (ref 70–99)
HCT VFR BLD CALC: 42.5 % — SIGNIFICANT CHANGE UP (ref 39–50)
HGB BLD-MCNC: 13.5 G/DL — SIGNIFICANT CHANGE UP (ref 13–17)
IMM GRANULOCYTES NFR BLD AUTO: 0.6 % — SIGNIFICANT CHANGE UP (ref 0–1.5)
LYMPHOCYTES # BLD AUTO: 1.27 K/UL — SIGNIFICANT CHANGE UP (ref 1–3.3)
LYMPHOCYTES # BLD AUTO: 18.9 % — SIGNIFICANT CHANGE UP (ref 13–44)
MCHC RBC-ENTMCNC: 29.4 PG — SIGNIFICANT CHANGE UP (ref 27–34)
MCHC RBC-ENTMCNC: 31.8 GM/DL — LOW (ref 32–36)
MCV RBC AUTO: 92.6 FL — SIGNIFICANT CHANGE UP (ref 80–100)
MONOCYTES # BLD AUTO: 0.42 K/UL — SIGNIFICANT CHANGE UP (ref 0–0.9)
MONOCYTES NFR BLD AUTO: 6.3 % — SIGNIFICANT CHANGE UP (ref 2–14)
NEUTROPHILS # BLD AUTO: 4.89 K/UL — SIGNIFICANT CHANGE UP (ref 1.8–7.4)
NEUTROPHILS NFR BLD AUTO: 72.7 % — SIGNIFICANT CHANGE UP (ref 43–77)
PLATELET # BLD AUTO: 166 K/UL — SIGNIFICANT CHANGE UP (ref 150–400)
POTASSIUM SERPL-MCNC: 4 MMOL/L — SIGNIFICANT CHANGE UP (ref 3.5–5.3)
POTASSIUM SERPL-SCNC: 4 MMOL/L — SIGNIFICANT CHANGE UP (ref 3.5–5.3)
PROT SERPL-MCNC: 7.2 G/DL — SIGNIFICANT CHANGE UP (ref 6.6–8.7)
RBC # BLD: 4.59 M/UL — SIGNIFICANT CHANGE UP (ref 4.2–5.8)
RBC # FLD: 14.6 % — HIGH (ref 10.3–14.5)
SODIUM SERPL-SCNC: 145 MMOL/L — SIGNIFICANT CHANGE UP (ref 135–145)
TROPONIN T SERPL-MCNC: <0.01 NG/ML — SIGNIFICANT CHANGE UP (ref 0–0.06)
WBC # BLD: 6.72 K/UL — SIGNIFICANT CHANGE UP (ref 3.8–10.5)
WBC # FLD AUTO: 6.72 K/UL — SIGNIFICANT CHANGE UP (ref 3.8–10.5)

## 2020-02-28 PROCEDURE — 71046 X-RAY EXAM CHEST 2 VIEWS: CPT | Mod: 26

## 2020-02-28 PROCEDURE — 99255 IP/OBS CONSLTJ NEW/EST HI 80: CPT

## 2020-02-28 PROCEDURE — 93010 ELECTROCARDIOGRAM REPORT: CPT

## 2020-02-28 PROCEDURE — 99291 CRITICAL CARE FIRST HOUR: CPT

## 2020-02-28 RX ORDER — NADOLOL 80 MG/1
80 TABLET ORAL DAILY
Refills: 0 | Status: DISCONTINUED | OUTPATIENT
Start: 2020-02-28 | End: 2020-02-29

## 2020-02-28 RX ADMIN — Medication 50 MILLIGRAM(S): at 18:11

## 2020-02-28 NOTE — CONSULT NOTE ADULT - SUBJECTIVE AND OBJECTIVE BOX
[facility]                                                       [facility] [place]                                                      Office: [address] [id number]                                                       Telephone: [phone]. Fax:[phone]    HPI:  Dio Venegas is a 29 year old male with ITP (on steroids, followed by Dr. Lesvia Ye), MICHELLE on CPAP, paroxysmal AF, and idiopathic VF s/p S-ICD who presents after getting multiple ICD shocks at home.    The patient was in his usual state of health until Wednesday night. His wife notes that she woke up to him appearing stiff, shaking (almost as if seizing), eyes rolling back into his head and then arching his back. He then awoke and asked "what happened." He felt OK otherwise and went back to bed. He had another shock later that night. On Thursday the EP office received a transmission that he had received 2 shocks for VF. These were suggestive of long-short sequences but the "long" sequence was about 60 bpm. He also had one episode of non-sustained monomorphic VT. He was recommended to get STAT blood work and change to Nadolol but when he went to the pharmacy they did not have it. He took his usual dose of Metoprolol Succinate 200mg daily last night again. This early morning he again had an ICD shock for VF. The office received his report of VF and he was told to present to the ER. He only notes that his left lower back and inner thigh discomfort described as "burning."    He otherwise feels well without any complaints of chest pain/pressure, palpitations, dizziness, lightheadedness, dyspnea, orthopnea, PND, peripheral edema, vision/hearing changes, nausea, vomiting diarrhea, dysuria, hematuria.    Cardiac Testing:    Stress Test: 3/20/2017: No ischemia. No symptoms. Narrow complex and wide complex tachycardia in recovery suggestive of SVT and NSVT.    Cardiac MRI: 12/2013: Normal biventricular function. No LGE.    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    PAST MEDICAL & SURGICAL HISTORY:  History of ITP: 12/2019 s/p oral steroid tx with normalization of platelet count  Atrial fibrillation  Obstructive sleep apnea: CPAP  History of ventricular fibrillation: last ICD shock 8/2019  S/P ICD (internal cardiac defibrillator) procedure: SQ ICD s/p 4 revisions last 9/2019    REVIEW OF SYSTEMS: As per HPI. Remaining 10 point ROS were reviewed and are negative.    Home Meds:  Metoprolol 200mg daily  Prednisone 50mg daily    Allergies  No Known Allergies    SOCIAL HISTORY:    Employed as contractor  Occasional EtOH use, tobacco use    FAMILY HISTORY:  Father and mother alive.  No significant cardiac history.    Vital Signs Last 24 Hrs  T(C): 36.7 (28 Feb 2020 09:01), Max: 36.7 (28 Feb 2020 09:01)  T(F): 98 (28 Feb 2020 09:01), Max: 98 (28 Feb 2020 09:01)  HR: 76 (28 Feb 2020 09:01) (76 - 76)  BP: 100/60 (28 Feb 2020 09:01) (100/60 - 100/60)  BP(mean): --  RR: 16 (28 Feb 2020 09:01) (16 - 16)  SpO2: 100% (28 Feb 2020 09:01) (100% - 100%)    Physical Exam:  Appearance: Normal, well nourished	  HEENT: Normal oral mucosa, PERRL, EOMI, sclera non-icteric	  Lymphatic: No cervical lymphadenopathy, no thyromegaly  Cardiovascular: Normal S1 S2, No JVD, No cardiac murmurs, No carotid bruits, No peripheral edema  Respiratory: Lungs clear to auscultation, unlabored  Psychiatry: A & O x 3, Mood & affect appropriate  Gastrointestinal:  Soft, Non-tender, + BS, no bruits  Skin: No rashes, No ecchymoses, No cyanosis  Neurologic: Grossly non-focal with full strength in all four extremities  Extremities: Normal range of motion, No clubbing, cyanosis or edema    LABS:  Pending.    Radiology:  Pending    ECG: NSR with no acute ischemic changes. Frequent narrow PVCs with typical RBLI axis (at times LS axis; sometimes with II/III and R/L discordance (II/L positive, III/R negative).

## 2020-02-28 NOTE — ED PROVIDER NOTE - OBJECTIVE STATEMENT
Pt is a 30yo M with history of SICD placement presenting with 3 discharges of his SICD yesterday. He states they all occurred while he was sleeping, 2:30am, 5am, and 11:30pm. He denies any pain, palpitations, or discomfort during these times and notes he was sound asleep and woken from sleep by the shocks. He reports that he takes metoprolol regularly and has not missed or doubled doses. He follows with Dr. Barkley from . He cannot recall the last time he has had a stress, echo, or cath. He denies any recent illnesses, f/c/n/v/d, currently without complaints. He states that he returned from a trip to [place] last Saturday 2/22, but has had no SOB, LE swelling, or chest discomfort.

## 2020-02-28 NOTE — ED PROVIDER NOTE - CLINICAL SUMMARY MEDICAL DECISION MAKING FREE TEXT BOX
Patient presenting with multiple episodes of SCID discharge. Dr. Barkley informed and will evaluate patient here at [facility]. Patient currently asymptomatic. Will evaluate for infection, metabolic abnormalities, and ACS with CBC, CMP, troponin, CXR. As per DR. Barkley note, patient to be admitted for observation, medical management, and ablation.

## 2020-02-28 NOTE — ED PROVIDER NOTE - NS ED ROS FT
General: Denies fever, chills  HEENT: Denies sensory changes, sore throat  Neck: Denies neck pain, neck stiffness  Resp: Denies coughing, SOB  Cardiovascular: Denies CP, palpitations, LE edema  GI: Denies nausea, vomiting, abdominal pain, diarrhea  : Denies dysuria, hematuria, frequency, incontinence  MSK: Denies back pain  Neuro: Denies HA, dizziness  Skin: Denies rashes

## 2020-02-28 NOTE — H&P ADULT - NSHPLABSRESULTS_GEN_ALL_CORE
13.5   6.72  )-----------( 166      ( 28 Feb 2020 09:50 )             42.5    02-28    145  |  104  |  11.0  ----------------------------<  46<LL>  4.0   |  28.0  |  0.78    Ca    9.4      28 Feb 2020 09:50    TPro  7.2  /  Alb  4.5  /  TBili  1.1  /  DBili  x   /  AST  25  /  ALT  30  /  AlkPhos  58  02-28

## 2020-02-28 NOTE — H&P ADULT - HISTORY OF PRESENT ILLNESS
30 y/o male with history of SICD placement presenting with 3 episodes of firing of his SICD yesterday and last night. They all occurred while he was sleeping, 2:30am, 5am, and 11:30pm. He denies any pain, palpitations, or discomfort during these times and notes he was asleep and woken from sleep by the shocks. He takes metoprolol and vert complaint. He follows with Dr. Barkley from EP. Pt denies any fevers, no chest pain, n/v or abd pain. No cough or recent sick contact. He returned from [place] last Saturday 2/22/20, but has had no SOB, LE swelling, or chest discomfort or fevers or cold sx. Pt seen by Dr. Barkley EP and wants pt to be admitted on tele over the weekend and possible ablation on 3/2/20 ?vance-dependent VF?

## 2020-02-28 NOTE — ED ADULT NURSE NOTE - CHIEF COMPLAINT QUOTE
Pt with h/o cardiac arrest 6 years ago and has a [employer] AICD in place. AICD fired 3 times in the last 2 days. Pt states it woke him up from his sleep. Pt denies any chest pain, palpitations or SOB at this time. Pt sent in by Dr Barkley.

## 2020-02-28 NOTE — H&P ADULT - NSHPPHYSICALEXAM_GEN_ALL_CORE
PHYSICAL EXAM:  Vital Signs Last 24 Hrs  T(C): 36.7 (28 Feb 2020 09:01), Max: 36.7 (28 Feb 2020 09:01)  T(F): 98 (28 Feb 2020 09:01), Max: 98 (28 Feb 2020 09:01)  HR: 76 (28 Feb 2020 09:01) (76 - 76)  BP: 100/60 (28 Feb 2020 09:01) (100/60 - 100/60)  BP(mean): --  RR: 16 (28 Feb 2020 09:01) (16 - 16)  SpO2: 100% (28 Feb 2020 09:01) (100% - 100%)    GENERAL: NAD, well-groomed, well-developed  HEAD:  Atraumatic, Normocephalic  EYES: EOMI, PERRLA, conjunctiva and sclera clear  ENMT: No tonsillar erythema, exudates, or enlargement; Moist mucous membranes  NERVOUS SYSTEM:  Alert & Oriented X3, Good concentration; Motor Strength 5/5 B/L upper and lower extremities  CHEST/LUNG: Clear to auscultation bilaterally; No rales, rhonchi, wheezing  HEART: Regular rate and rhythm; No murmurs  ABDOMEN: Soft, Nontender, Nondistended; Bowel sounds present  EXTREMITIES:  2+ Peripheral Pulses, No clubbing, cyanosis, or edema

## 2020-02-28 NOTE — ED ADULT NURSE NOTE - PMH
Atrial fibrillation    History of ITP  12/2019 s/p oral steroid tx with normalization of platelet count  History of ventricular fibrillation  last ICD shock 8/2019  Obstructive sleep apnea  CPAP

## 2020-02-28 NOTE — H&P ADULT - ASSESSMENT
30 y/o male with history of SICD placement presenting with 3 episodes of firing of his SICD yesterday and last night. They all occurred while he was sleeping, 2:30am, 5am, and 11:30pm. He denies any pain, palpitations, or discomfort during these times and notes he was asleep and woken from sleep by the shocks. He takes metoprolol and vert complaint. He follows with Dr. Barkley from EP. Pt denies any fevers, no chest pain, n/v or abd pain. No cough or recent sick contact. He returned from [place] last Saturday 2/22/20, but has had no SOB, LE swelling, or chest discomfort or fevers or cold sx. Pt seen by Dr. Barkley EP and wants pt to be admitted on tele over the weekend and possible ablation on 3/2/20.    1) SICD firing X3   - admit to tele  - Dr Filippo CASANOVA following and per his recs:  NPO after MN on Sunday for plan for catheter ablation Monday (plan to do NEVAEH beforehand as he has pAF and approach will likely be transeptal  Change Metoprolol to nadolol 80mg daily  If he has recurrent VF and VF storm then we can start him on Isoproterenol infusion and transferred to MICU.    2) ITP/Thrombocytopenia  - oupt follow up with hematology  - Plt are 166K today  - on prednisone taper

## 2020-02-28 NOTE — ED PROVIDER NOTE - ATTENDING CONTRIBUTION TO CARE
The patient seen and examined    Idiopathic V-fib    I, Morales Chun, performed the initial face to face bedside interview with this patient regarding history of present illness, review of symptoms and relevant past medical, social and family history.  I completed an independent physical examination.  I was the initial provider who evaluated this patient. I have signed out the follow up of any pending tests (i.e. labs, radiological studies) to the resident.  I have communicated the patient’s plan of care and disposition with the resident.

## 2020-02-28 NOTE — CONSULT NOTE ADULT - ASSESSMENT
In summary, this is a 29 year old male with ITP (on steroids, followed by Dr. Lesvia Ye), MICHELLE on CPAP, paroxysmal AF, and idiopathic VF s/p S-ICD who presents after getting multiple ICD shocks at home.    Review of remote monitor strips show that most episodes are precipitated by a single PVC. 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 His ECG today shows multiple narrow PVCs that are all RB but have different exits (some left inferior axis, some with left superior axis and R/L and II/III discordance). 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 Some of them do however appear to be para-Hisian in origin. It is unclear which PVC is the cause of his malignant VF but will compare rhythm strip ECG to his S-ICD [place] to see if we can correlate a single PVC with the S-ICD [place] morhpology. If a single PVC can be identified then this can be targeted for ablation, which we will plan for Monday (keep NPO after MN).    We will continue to monitor the patient over the weekend to see if he has recurrent VF (sustained or non-sustained) and to identify the cause of such events.    Since there is a chance patient may have vance-dependent VF, we will reduce his beta blocker. We will change from Metoprolol Succinate 200mg daily to Nadolol 80mg daily.    He should be admitted to 4TWR telemetry and kept on continuous monitoring. If he has recurrent VF and VF storm then we can start him on Isoproterenol infusion and transferred to MICU.    Recommendations:  - Admit 4TWR, monitor on telemetry  - Change Metoprolol Succinate 200mg daily to Nadolol 80mg daily  - S-ICD interrogation and correlation with ECG rhythm strip  - NPO after MN on Sunday for plan for catheter ablation Monday (plan to do NEVAEH beforehand as he has pAF and approach will likely be transeptal)  - After ablation, may consider using Quinidine (pt with idiopathic VF and AF suggests a problem with SCN5A mutation which can be treated with Quinidine)    Thank you for this consult. We will follow with you.    Aydin Barkley MD  Clinical Cardiac Electrophysiology In summary, this is a 29 year old male with ITP (on steroids, followed by Dr. Lesvia Ye), MICHELLE on CPAP, paroxysmal AF, and idiopathic VF s/p S-ICD who presents after getting multiple ICD shocks at home.    Review of remote monitor strips show that most episodes are precipitated by a single PVC. 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 His ECG today shows multiple APCs (not PVCs). Will monitor on telemetry for malignant PVCs. If we can correlate a single PVC with the S-ICD [facility]. If a single PVC can be identified then this can be targeted for ablation, which we will plan for Monday (keep NPO after MN).    We will continue to monitor the patient over the weekend to see if he has recurrent VF (sustained or non-sustained) and to identify the cause of such events.    Since there is a chance patient may have vance-dependent VF, we will reduce his beta blocker. We will change from Metoprolol Succinate 200mg daily to Nadolol 80mg daily.    He should be admitted to 4TWR telemetry and kept on continuous monitoring. If he has recurrent VF and VF storm then we can start him on Isoproterenol infusion and transferred to MICU.    Recommendations:  - Admit 4TWR, monitor on telemetry  - Change Metoprolol Succinate 200mg daily to Nadolol 80mg daily  - S-ICD interrogation and correlation with ECG rhythm strip  - NPO after MN on Sunday for plan for catheter ablation Monday (plan to do NEVAEH beforehand as he has pAF and approach will likely be transeptal)  - After ablation, may consider using Quinidine (pt with idiopathic VF and AF suggests a problem with SCN5A mutation which can be treated with Quinidine)    Thank you for this consult. We will follow with you.    Aydin Barkley MD  Clinical Cardiac Electrophysiology

## 2020-02-28 NOTE — ED PROVIDER NOTE - PHYSICAL EXAMINATION
General: Well appearing male in no acute distress  HEENT: Normocephalic, atraumatic. Moist mucous membranes. Oropharynx clear. No lymphadenopathy.  Eyes: No scleral icterus. EOMI. LYDIA.  Neck:. Soft and supple. Full ROM without pain. No midline tenderness  Cardiac: Regular rate and regular rhythm. Peripheral pulses 2+ and symmetric. No LE edema.  Resp: Lungs CTAB. Speaking in full sentences. No wheezes, rales or rhonchi.  Abd: Soft, non-tender, non-distended. No guarding or rebound. No scars, masses, or lesions.  Back: Spine midline and non-tender. No CVA tenderness.    Skin: No rashes, abrasions, or lacerations.  Neuro: AO x 3. Moves all extremities symmetrically. Motor strength and sensation grossly intact

## 2020-02-28 NOTE — ED ADULT TRIAGE NOTE - CHIEF COMPLAINT QUOTE
Pt with h/o cardiac arrest 6 years ago and has a [employer] AICD in place. AICD fired 3 times in the last 2 days. Pt states it woke him up from his sleep. Pt denies any chest pain, palpitations or SOB at this time. Pt sent in by Dr Barkley.

## 2020-02-28 NOTE — ED ADULT NURSE NOTE - OBJECTIVE STATEMENT
30yo male c/o SICD firing. pt states awoke from sleep from firing. has happened several times in past 3 days. dr villagomez sent pt to ed for eval. denies any chest pain, sob, n/v/d, fever, chills, urinary symptoms, cough congestion. recent travel to [place]. no BLE edema

## 2020-02-28 NOTE — ED ADULT NURSE NOTE - AS SC BRADEN NUTRITION
Visit Information Date & Time Provider Department Dept. Phone Encounter #  
 9/15/2017  9:45 AM Nilesh Schrader  [place]  [id number] Follow-up Instructions Return if symptoms worsen or fail to improve. Upcoming Health Maintenance Date Due Hepatitis A Peds Age 1-18 (1 of 2 - Standard Series) 1/5/1999 DTaP/Tdap/Td series (1 - Tdap) 1/5/2005 HPV AGE 9Y-26Y (1 of 3 - Female 3 Dose Series) 1/5/2009 INFLUENZA AGE 9 TO ADULT 8/1/2017 Allergies as of 9/15/2017  Review Complete On: 9/15/2017 By: Trina Dave LPN Severity Noted Reaction Type Reactions Pcn [Penicillins]  09/15/2017    Rash Current Immunizations  Reviewed on 9/15/2017 No immunizations on file. Reviewed by Nilesh Schrader MD on 9/15/2017 at  9:50 AM  
You Were Diagnosed With   
  
 Codes Comments Lymphedema    -  Primary ICD-10-CM: I89.0 ICD-9-CM: 478.1 Encounter for preconception consultation     ICD-10-CM: W95.68 ICD-9-CM: V26.49 Vitals BP Pulse Temp Resp Height(growth percentile) Weight(growth percentile) 116/77 (80 %/ 91 %)* (BP 1 Location: Left arm, BP Patient Position: Sitting) 87 97.9 °F (36.6 °C) (Oral) 16 5' 2\" (1.575 m) (18 %, Z= -0.90) 220 lb (99.8 kg) (99 %, Z= 2.21) LMP SpO2 BMI OB Status Smoking Status 09/08/2017 99% 40.24 kg/m2 (98 %, Z= 2.15) Having regular periods Never Smoker *BP percentiles are based on NHBPEP's 4th Report Growth percentiles are based on CDC 2-20 Years data. Vitals History BMI and BSA Data Body Mass Index Body Surface Area  
 40.24 kg/m 2 2.09 m 2 Preferred Pharmacy Pharmacy Name Phone [address] [facility], [address] [facility] [employer] [address] [place] [address] [phone] Your Updated Medication List  
  
   
This list is accurate as of: 9/15/17 10:10 AM.  Always use your most recent med list.  
  
  
  
  
 folic acid 1 mg tablet Commonly known as:  [employer]  
 Take 1 Tab by mouth daily. PNV38-Iron Cbn&Gluc-FA-DSS-DHA 35-1- mg Cmpk Take  by mouth. Prescriptions Sent to Pharmacy Refills  
 folic acid (FOLVITE) 1 mg tablet 5 Sig: Take 1 Tab by mouth daily. Class: Normal  
 Pharmacy: [address] [place] [address], [address] [place] [address] #: [phone] Route: Oral  
  
We Performed the Following AMB SUPPLY ORDER [[id number] Custom] Comments:  
 Lymphedema I 89.0 Follow-up Instructions Return if symptoms worsen or fail to improve. Patient Instructions Lymphedema: Care Instructions Your Care Instructions Lymphedema is fluid that builds up in the arms or legs. It is often caused by surgery to remove lymph nodes during cancer treatment, especially breast cancer surgery, which can cause fluid to build up in the arm. It can happen after radiation treatment to an area that involves lymph nodes. It also can be caused by a fractured bone or surgery to fix a fracture. And some medicines also can cause lymphedema. Some people get it for unknown reasons. Normally, lymph nodes trap bacteria and other substances as fluid flows through them. Then, the white cells in the body's defense, or immune, system can destroy the substances. But if there are few or no lymph nodesor if the lymph system in an arm or leg has been damagedfluid can build up in the affected arm or leg. You can take simple steps at home to help treat or prevent fluid buildup. Treatment may include raising the arm or leg to let gravity drain the fluid. You also can wear compression stockings or sleeves. Follow-up care is a key part of your treatment and safety. Be sure to make and go to all appointments, and call your doctor if you are having problems. Its also a good idea to know your test results and keep a list of the medicines you take. How can you care for yourself at home? · Wear a compression stocking or sleeve as your doctor suggests. It can help keep fluid from pooling in an arm or leg. Wear it during air travel. · Prop up the swollen arm or leg on a pillow anytime you sit or lie down. Try to keep it above the level of your heart. This will help reduce swelling. · Avoid crossing your legs if your legs are swollen. · Get some exercise on most days of the week. Increase the intensity of exercise slowly. Water aerobics can help reduce swelling by helping fluid move around. Wear your compression stocking or sleeve during exercise, but not during water exercise. · See a physical therapist. He or she can teach you how to do self-massage to help fluid move around. You also can learn what activities would be best for you. · Keep your feet clean and wear clean socks or stockings every day. Check your feet often for signs of infection, such as redness or heat. Do not walk barefoot. · If you have had lymph nodes removed from under your arm: ¨ Do not have blood drawn from the arm on the side of the lymph node surgery. ¨ Do not allow a blood pressure cuff to be placed on that arm. If you are in the hospital, make sure your nurse and other hospital staff know of your condition. ¨ Wear gloves when gardening or doing other activities that may lead to cuts on your fingers or hands. · If you have had lymph nodes removed from your groin: ¨ Bathe your feet daily in lukewarm, not hot, water. Use a mild soap that has a moisturizer, or use a moisturizer separately. ¨ Check your feet for blisters or cuts. ¨ Wear comfortable and supportive shoes that fit properly. ¨ Wear the correct size of panty hose and stockings. Avoid garters or knee-high or thigh-high stockings. · Ask your doctor how to treat any cuts, scratches, insect bites, or other injuries that may occur. · Use sunscreen and insect repellent when outdoors to protect your skin from sunburn and insect bites. · Wear medical alert jewelry that says you have lymphedema. You can buy these at most drugstores and on the Internet. When should you call for help? Call your doctor now or seek immediate medical care if: 
· You have signs of infection, such as: 
¨ Increased pain, swelling, warmth, or redness. ¨ Red streaks leading from the area of lymph node surgery or radiation. ¨ Pus draining from the area of surgery or radiation. ¨ A fever. · You have a feeling of tightness or swelling in or around your arm, hand, leg, or foot. · You have pain, weakness that keeps getting worse, or a \"pins-and-needles\" feeling. Watch closely for changes in your health, and be sure to contact your doctor if: 
· You continue to have fluid buildup even with home treatment. Where can you learn more? Go to http://[employer].info/. Enter V398 in the search box to learn more about \"Lymphedema: Care Instructions. \" Current as of: July 26, 2016 Content Version: 11.3 © 2564-0376 [employer]. Care instructions adapted under license by [employer] (which disclaims liability or warranty for this information). If you have questions about a medical condition or this instruction, always ask your healthcare professional. [address] any warranty or liability for your use of this information. Introducing [facility]! Mayank James introduces [employer] patient portal. Now you can access parts of your medical record, email your doctor's office, and request medication refills online. 1. In your internet browser, go to [email or web address] 2. Click on the First Time User? Click Here link in the Sign In box. You will see the New Member Sign Up page. 3. Enter your [employer] Access Code exactly as it appears below. You will not need to use this code after youve completed the sign-up process.  If you do not sign up before the expiration date, you must request a new code. · NoFlo Access Code: [email or web address] Expires: 12/14/2017  9:33 AM 
 
4. Enter the last four digits of your Social Security Number (xxxx) and Date of Birth (mm/dd/yyyy) as indicated and click Submit. You will be taken to the next sign-up page. 5. Create a NoFlo ID. This will be your NoFlo login ID and cannot be changed, so think of one that is secure and easy to remember. 6. Create a [employer]Flo password. You can change your password at any time. 7. Enter your Password Reset Question and Answer. This can be used at a later time if you forget your password. 8. Enter your e-mail address. You will receive e-mail notification when new information is available in [address] 9. Click Sign Up. You can now view and download portions of your medical record. 10. Click the Download Summary menu link to download a portable copy of your medical information. If you have questions, please visit the Frequently Asked Questions section of the No[employer]lo website. Remember, [employer]lo is NOT to be used for urgent needs. For medical emergencies, dial 911. Now available from your iPhone and Android! Please provide this summary of care documentation to your next provider. Your primary care clinician is listed as Alejandro Olmstead. If you have any questions after today's visit, please call [phone]. (4) excellent

## 2020-02-29 DIAGNOSIS — Z86.2 PERSONAL HISTORY OF DISEASES OF THE BLOOD AND BLOOD-FORMING ORGANS AND CERTAIN DISORDERS INVOLVING THE IMMUNE MECHANISM: ICD-10-CM

## 2020-02-29 DIAGNOSIS — Z95.810 PRESENCE OF AUTOMATIC (IMPLANTABLE) CARDIAC DEFIBRILLATOR: ICD-10-CM

## 2020-02-29 DIAGNOSIS — I47.2 VENTRICULAR TACHYCARDIA: ICD-10-CM

## 2020-02-29 LAB — GLUCOSE BLDC GLUCOMTR-MCNC: 86 MG/DL — SIGNIFICANT CHANGE UP (ref 70–99)

## 2020-02-29 PROCEDURE — 99233 SBSQ HOSP IP/OBS HIGH 50: CPT

## 2020-02-29 RX ORDER — ISOPROTERENOL HYDROCHLORIDE 1 MG/5ML
2 INJECTION, SOLUTION INTRACARDIAC; INTRAMUSCULAR; INTRAVENOUS; SUBCUTANEOUS
Qty: 1 | Refills: 0 | Status: DISCONTINUED | OUTPATIENT
Start: 2020-02-29 | End: 2020-02-29

## 2020-02-29 RX ORDER — ISOPROTERENOL HYDROCHLORIDE 1 MG/5ML
2 INJECTION, SOLUTION INTRACARDIAC; INTRAMUSCULAR; INTRAVENOUS; SUBCUTANEOUS
Qty: 1 | Refills: 0 | Status: DISCONTINUED | OUTPATIENT
Start: 2020-02-29 | End: 2020-03-02

## 2020-02-29 RX ADMIN — ISOPROTERENOL HYDROCHLORIDE 30 MICROGRAM(S)/MIN: 1 INJECTION, SOLUTION INTRACARDIAC; INTRAMUSCULAR; INTRAVENOUS; SUBCUTANEOUS at 18:30

## 2020-02-29 RX ADMIN — NADOLOL 80 MILLIGRAM(S): 80 TABLET ORAL at 05:45

## 2020-02-29 RX ADMIN — Medication 50 MILLIGRAM(S): at 18:30

## 2020-02-29 NOTE — CONSULT NOTE ADULT - SUBJECTIVE AND OBJECTIVE BOX
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  Pt admitted to hospital due to multiple shocks by Subcutaneous ICD.  Pt found to be in polymorphic VT/VF and has received multiple shocks.  He was initially admitted to telemetry with plan for ablation on Monday but pt continues to have long runs VT and has been receiving shocks this  morning. Pt was urgently moved to ICU and placed on Isoprel gtt as per Dr Barkley/EP cardio.  Will titrate gtt to maintain HR 70-75 as tolerated by pt.  If pt continues to have episodes of VT may require emergent placement of TVP wire with overdrive pacing.    Patient is a 29y old  Male who presents with a chief complaint of SICD firing (29 Feb 2020 09:11)      BRIEF HOSPITAL COURSE: as above    Events last 24 hours: VT/VF with ICD shocks    PAST MEDICAL & SURGICAL HISTORY:  History of ITP: 12/2019 s/p oral steroid tx with normalization of platelet count  Atrial fibrillation  Obstructive sleep apnea: CPAP  History of ventricular fibrillation: last ICD shock 8/2019  S/P ICD (internal cardiac defibrillator) procedure: SQ ICD s/p 4 revisions last 9/2019      Review of Systems:  CONSTITUTIONAL: No fever, chills, or fatigue  EYES: No eye pain, visual disturbances, or discharge  ENMT:  No difficulty hearing, tinnitus, vertigo; No sinus or throat pain  NECK: No pain or stiffness  RESPIRATORY: No cough, wheezing, chills or hemoptysis; No shortness of breath  CARDIOVASCULAR: pos palpitations, pt can feel when he may get shock  GASTROINTESTINAL: No abdominal or epigastric pain. No nausea, vomiting, or hematemesis; No diarrhea or constipation. No melena or hematochezia.  GENITOURINARY: No dysuria, frequency, hematuria, or incontinence  NEUROLOGICAL: No headaches, memory loss, loss of strength, numbness, or tremors  SKIN: No itching, burning, rashes, or lesions   MUSCULOSKELETAL: No joint pain or swelling; No muscle, back, or extremity pain  PSYCHIATRIC: No depression, anxiety, mood swings, or difficulty sleeping      Medications:    isoproterenol Infusion 2 MICROgram(s)/Min IV Continuous <Continuous>  nadolol 80 milliGRAM(s) Oral daily    predniSONE   Tablet 50 milliGRAM(s) Oral daily    ICU Vital Signs Last 24 Hrs  T(C): 37.1 (29 Feb 2020 09:30), Max: 37.1 (28 Feb 2020 15:45)  T(F): 98.7 (29 Feb 2020 09:30), Max: 98.7 (28 Feb 2020 15:45)  HR: 62 (29 Feb 2020 09:45) (61 - 105)  BP: 116/67 (29 Feb 2020 09:45) (100/60 - 128/74)  BP(mean): 87 (29 Feb 2020 09:45) (79 - 88)  ABP: --  ABP(mean): --  RR: 20 (29 Feb 2020 09:45) (16 - 33)  SpO2: 98% (29 Feb 2020 09:45) (98% - 100%)      LABS:                        13.5   6.72  )-----------( 166      ( 28 Feb 2020 09:50 )             42.5     02-28    145  |  104  |  11.0  ----------------------------<  46<LL>  4.0   |  28.0  |  0.78    Ca    9.4      28 Feb 2020 09:50    TPro  7.2  /  Alb  4.5  /  TBili  1.1  /  DBili  x   /  AST  25  /  ALT  30  /  AlkPhos  58  02-28      CARDIAC MARKERS ( 28 Feb 2020 09:50 )  x     / <0.01 ng/mL / x     / x     / x          CAPILLARY BLOOD GLUCOSE      POCT Blood Glucose.: 86 mg/dL (29 Feb 2020 09:39)        CULTURES:      Physical Examination:    General: No acute distress.  Alert, oriented, interactive, nonfocal    HEENT: Pupils equal, reactive to light.  Symmetric.    PULM: Clear to auscultation bilaterally, no significant sputum production    CVS: Irregular, no murmurs, rubs, or gallops    ABD: Soft, nondistended, nontender, normoactive bowel sounds, no masses    EXT: No edema, nontender    SKIN: Warm and well perfused, no rashes noted.    RADIOLOGY: images reviewed    CRITICAL CARE TIME SPENT: Critical Care time: 50 mins assessing presenting problems of acute illness that poses high probability of life threatening deterioration or end organ damage/dysfunction.  Medical decision making including Initiating plan of care, reviewing data, reviewing radiology, direct patient bedside evaluation and interpretation of vital signs, any necessary ventilator management , discussion with multidisciplinary team, discussing goals of care with patient/family, all non inclusive of procedures

## 2020-02-29 NOTE — PROGRESS NOTE ADULT - SUBJECTIVE AND OBJECTIVE BOX
Internal Medicine Hospitalist Progress Note  follow up for VT , AICD   he had VT on monitor lost his consciousness briefly, got shock   cardiology EP attending present   ICu consulted     Pt is seen by me now , awake alert , no complaints   no SOB or CP               ROS: as above, all remaining ROS are negative.       BACKGROUND:  MEDICATIONS  (STANDING):  isoproterenol Infusion 1 MICROgram(s)/Min (15 mL/Hr) IV Continuous <Continuous>  nadolol 80 milliGRAM(s) Oral daily  predniSONE   Tablet 50 milliGRAM(s) Oral daily    MEDICATIONS  (PRN):    Allergies    No Known Allergies    Intolerances            VITALS:  Vital Signs Last 24 Hrs  T(C): 36.4 (2020 05:41), Max: 37.1 (2020 15:45)  T(F): 97.5 (2020 05:41), Max: 98.7 (2020 15:45)  HR: 69 (2020 05:41) (61 - 83)  BP: 112/69 (2020 05:41) (100/60 - 128/74)  BP(mean): 79 (2020 15:21) (79 - 79)  RR: 18 (2020 05:41) (16 - 18)  SpO2: 98% (2020 05:41) (98% - 100%) Daily     Daily Weight in k.6 (2020 05:01)  CAPILLARY BLOOD GLUCOSE        I&O's Summary      PHYSICAL EXAM:      Constitutional:    Eyes:    ENMT:    Neck:    Breasts:    Back:    Respiratory:    Cardiovascular:    Gastrointestinal:    Genitourinary:    Rectal:    Extremities:    Vascular:    Neurological:    Skin:    Lymph Nodes:    Musculoskeletal:    Psychiatric:          LABS:                        13.5   6.72  )-----------( 166      ( 2020 09:50 )             42.5     02-    145  |  104  |  11.0  ----------------------------<  46<LL>  4.0   |  28.0  |  0.78    Ca    9.4      2020 09:50    TPro  7.2  /  Alb  4.5  /  TBili  1.1  /  DBili  x   /  AST  25  /  ALT  30  /  AlkPhos  58          Radiology : Internal Medicine Hospitalist Progress Note  follow up for non sustained  VT , AICD   he had VT on monitor associated feeling palpitations then almost felt like going to pas out , then got shock   cardiology EP attending present   ICu consulted     Pt is seen by me now , awake alert , no complaints   no SOB or CP               ROS: as above, all remaining ROS are negative.       BACKGROUND:  MEDICATIONS  (STANDING):  isoproterenol Infusion 1 MICROgram(s)/Min (15 mL/Hr) IV Continuous <Continuous>  nadolol 80 milliGRAM(s) Oral daily  predniSONE   Tablet 50 milliGRAM(s) Oral daily    MEDICATIONS  (PRN):    Allergies    No Known Allergies    Intolerances            VITALS:  Vital Signs Last 24 Hrs  T(C): 36.4 (2020 05:41), Max: 37.1 (2020 15:45)  T(F): 97.5 (2020 05:41), Max: 98.7 (2020 15:45)  HR: 69 (2020 05:41) (61 - 83)  BP: 112/69 (2020 05:41) (100/60 - 128/74)  BP(mean): 79 (2020 15:21) (79 - 79)  RR: 18 (2020 05:41) (16 - 18)  SpO2: 98% (2020 05:41) (98% - 100%) Daily     Daily Weight in k.6 (2020 05:01)  CAPILLARY BLOOD GLUCOSE        I&O's Summary      PHYSICAL EXAM:      Constitutional: awake , alert     Neck: supple , no JVD     Respiratory: CTa bilateral     Cardiovascular: regular s1 /s2     Gastrointestinal: soft no tenderness , BS positive     Extremities: no pretibial edema     Skin: normal color no rash        LABS:                        13.5   6.72  )-----------( 166      ( 2020 09:50 )             42.5         145  |  104  |  11.0  ----------------------------<  46<LL>  4.0   |  28.0  |  0.78    Ca    9.4      2020 09:50    TPro  7.2  /  Alb  4.5  /  TBili  1.1  /  DBili  x   /  AST  25  /  ALT  30  /  AlkPhos  58          Radiology :

## 2020-02-29 NOTE — PROGRESS NOTE ADULT - ASSESSMENT
In summary 29 year old male with ITP (on steroids, followed by Dr. Lesvia Ye), MICHELLE on CPAP, paroxysmal AF, and idiopathic VF s/p S-ICD who presents with multiple ICD shocks.    He had a recurrent episode of VF this morning during 12-lead rhythm strip. It demonstrated NSR with PACs followed by a PVC inducing sustained PMVT/VF. The PVCs are of LBLS axis with V5-V6 transition and narrow suggestive of an RV source, possibly moderator band.    He should discontinue nadolol and start on Isoproterenol infusion at 1mcg/min (titrate for goal HR ~90 bpm). If ineffective, recommend transvenous pacemaker placement and pacing at 90 bpm. Transfer to ICU.    Plan for malignant PVC ablation on Monday morning (8 AM start), keep patient NPO after MN on Sunday.    Recommendations:  - Transfer to ICU  - Discontinue Nadolol  - Start Isoproterenol infusion at 1mcg/min STAT, titrate for goal HR ~90 bpm  - If ineffective place TVP and pace at 90 bpm  - NPO after MN on Sunday for malignant PVC ablation Monday (patient to also have NEVAEH before procedure)  - After ablation may consider using Quinidine (in setting of AF, SCN5A VUS, may be a sodium channel issue)    Discussed with Shaan Grimm, ICU PA.    Aydin Barkley MD  Clinical Cardiac Electrophysiology

## 2020-02-29 NOTE — CONSULT NOTE ADULT - PROBLEM SELECTOR RECOMMENDATION 9
Patient Education     Clinical Breast Exam    Many health organizations recommend a yearly clinical breast exam. This exam may be done by a gynecologist, family healthcare provider, nurse practitioner, nurse midwife, or specially trained nurse. Yearly breast exams help to make sure that breast conditions are found early.  Your healthcare provider’s role  A healthcare professional knows the tests and follow-up care needed if a problem is found. Your clinical exam is also a great time to ask questions about breast self-exams. You can find out if you’re checking your breasts in the best way. Or you may want to ask how pregnancy, breast implants, or breast reduction surgery affect the way you should check your breasts.  Diagnostic tests  If a clinical exam reveals a breast change, you may have other tests to find out more. These tests may include:  · Mammography. A low-dose X-ray of your breast tissue.  · Ultrasound. An imaging test that uses sound waves to create images of your breast.  · Biopsy. A small amount of breast tissue is removed by needle or by a cut (incision). The tissue is then checked under a microscope.  Guidelines for having clinical breast exams  The American College of Obstetricians and Gynecologists recommends that starting at age 29, you should have a clinical breast exam every 1 to 3 years. After age 40, have a clinical breast exam each year. If you’re at higher risk for breast cancer, you may need exams more often. Risk factors for breast cancer may include:  · Being over 50 or postmenopausal  · Having a family history of breast cancer  · Having the BRCA1 or BRCA2 gene mutation or certain other gene mutations  · Having more menstrual periods due to starting menstruation early (before age 12) or having a late menopause (after age 55)  · Having no pregnancies  · Having a first pregnancy after age 30  · Being obese  · Having a history of radiation treatment to your chest area  · Exposure to JUNIOR  during your mother's pregnancy  · Not being active  · Drinking too much alcohol  · Having dense breast tissue  · Taking hormone therapy after menopause  Other health organizations have different recommendations. Talk with your healthcare provider about what is best for you.   Date Last Reviewed: 8/1/2017  © 2749-7637 [employer]. [address] [place] [id number]. All rights reserved. This information is not intended as a substitute for professional medical care. Always follow your healthcare professional's instructions.            Upgraded to ICU   Isoprel gtt hung and has been titrated to 2mg/min with goal of HR 70-75, will continue to monitor closely and titrate further as needed  If continues to have episodes of Vtach may require emergent TVP wire placement  Cardio/EP cardio following  Plan for possible ablation VT/VF on monday

## 2020-02-29 NOTE — PROGRESS NOTE ADULT - ASSESSMENT
29 year old male with ITP (on steroids, followed by Dr. Lesvia Ye), MICHELLE on CPAP, paroxysmal AF, and idiopathic VF s/p S-ICD who presents with multiple ICD shocks, this am with recurrent episodes of VF , seen BY EP , ICU consulted transfered     1- VT /VF recurrent with AICD shock   EP and ICU input appreciated  txed to ICU start Isoproterenol infusion   nadolol stopped     will no longer follow while in ICU   further care per ICU team and EP

## 2020-03-01 LAB
ANION GAP SERPL CALC-SCNC: 11 MMOL/L — SIGNIFICANT CHANGE UP (ref 5–17)
BASOPHILS # BLD AUTO: 0.07 K/UL — SIGNIFICANT CHANGE UP (ref 0–0.2)
BASOPHILS NFR BLD AUTO: 0.9 % — SIGNIFICANT CHANGE UP (ref 0–2)
BUN SERPL-MCNC: 17 MG/DL — SIGNIFICANT CHANGE UP (ref 8–20)
CALCIUM SERPL-MCNC: 8.8 MG/DL — SIGNIFICANT CHANGE UP (ref 8.6–10.2)
CHLORIDE SERPL-SCNC: 103 MMOL/L — SIGNIFICANT CHANGE UP (ref 98–107)
CO2 SERPL-SCNC: 23 MMOL/L — SIGNIFICANT CHANGE UP (ref 22–29)
CREAT SERPL-MCNC: 0.6 MG/DL — SIGNIFICANT CHANGE UP (ref 0.5–1.3)
EOSINOPHIL # BLD AUTO: 0 K/UL — SIGNIFICANT CHANGE UP (ref 0–0.5)
EOSINOPHIL NFR BLD AUTO: 0 % — SIGNIFICANT CHANGE UP (ref 0–6)
GLUCOSE SERPL-MCNC: 147 MG/DL — HIGH (ref 70–99)
HCT VFR BLD CALC: 41.8 % — SIGNIFICANT CHANGE UP (ref 39–50)
HGB BLD-MCNC: 13.6 G/DL — SIGNIFICANT CHANGE UP (ref 13–17)
LYMPHOCYTES # BLD AUTO: 0.82 K/UL — LOW (ref 1–3.3)
LYMPHOCYTES # BLD AUTO: 10.4 % — LOW (ref 13–44)
MAGNESIUM SERPL-MCNC: 2.3 MG/DL — SIGNIFICANT CHANGE UP (ref 1.6–2.6)
MANUAL SMEAR VERIFICATION: SIGNIFICANT CHANGE UP
MCHC RBC-ENTMCNC: 29.6 PG — SIGNIFICANT CHANGE UP (ref 27–34)
MCHC RBC-ENTMCNC: 32.5 GM/DL — SIGNIFICANT CHANGE UP (ref 32–36)
MCV RBC AUTO: 91.1 FL — SIGNIFICANT CHANGE UP (ref 80–100)
MONOCYTES # BLD AUTO: 0.21 K/UL — SIGNIFICANT CHANGE UP (ref 0–0.9)
MONOCYTES NFR BLD AUTO: 2.6 % — SIGNIFICANT CHANGE UP (ref 2–14)
MYELOCYTES NFR BLD: 0.9 % — HIGH (ref 0–0)
NEUTROPHILS # BLD AUTO: 6.73 K/UL — SIGNIFICANT CHANGE UP (ref 1.8–7.4)
NEUTROPHILS NFR BLD AUTO: 85.2 % — HIGH (ref 43–77)
PHOSPHATE SERPL-MCNC: 2.2 MG/DL — LOW (ref 2.4–4.7)
PLAT MORPH BLD: NORMAL — SIGNIFICANT CHANGE UP
PLATELET # BLD AUTO: 142 K/UL — LOW (ref 150–400)
POTASSIUM SERPL-MCNC: 4.5 MMOL/L — SIGNIFICANT CHANGE UP (ref 3.5–5.3)
POTASSIUM SERPL-SCNC: 4.5 MMOL/L — SIGNIFICANT CHANGE UP (ref 3.5–5.3)
RBC # BLD: 4.59 M/UL — SIGNIFICANT CHANGE UP (ref 4.2–5.8)
RBC # FLD: 13.9 % — SIGNIFICANT CHANGE UP (ref 10.3–14.5)
RBC BLD AUTO: NORMAL — SIGNIFICANT CHANGE UP
SODIUM SERPL-SCNC: 137 MMOL/L — SIGNIFICANT CHANGE UP (ref 135–145)
WBC # BLD: 7.9 K/UL — SIGNIFICANT CHANGE UP (ref 3.8–10.5)
WBC # FLD AUTO: 7.9 K/UL — SIGNIFICANT CHANGE UP (ref 3.8–10.5)

## 2020-03-01 PROCEDURE — 93010 ELECTROCARDIOGRAM REPORT: CPT

## 2020-03-01 PROCEDURE — 99253 IP/OBS CNSLTJ NEW/EST LOW 45: CPT

## 2020-03-01 RX ORDER — MAGNESIUM SULFATE 500 MG/ML
1 VIAL (ML) INJECTION ONCE
Refills: 0 | Status: COMPLETED | OUTPATIENT
Start: 2020-03-01 | End: 2020-03-01

## 2020-03-01 RX ADMIN — Medication 50 MILLIGRAM(S): at 17:56

## 2020-03-01 RX ADMIN — Medication 100 GRAM(S): at 13:00

## 2020-03-01 RX ADMIN — Medication 62.5 MILLIMOLE(S): at 06:21

## 2020-03-01 NOTE — PROGRESS NOTE ADULT - PROBLEM SELECTOR PLAN 1
Isoprel gtt at 2mg/min pt tolerating well no further shocks  EP cardio following plan for VT ablation on Monday  If pt has further VT many require overdrive pacing via emergent TVP wire placement at bedside  monitor and replete electrolytes

## 2020-03-01 NOTE — PROGRESS NOTE ADULT - ASSESSMENT
ASSESSMENT:    29M w/ subcutaneous ICD, Hx of sudden cardiac death secndary to arrythmia. Admitted to ICU with multiple ICD firings, noted to be in polymorphic vtach required isoproterenol drip for control    Events last 24 hours:   -remains on isoproterenol  -had 1 self limited and brief episode of vtach overnight,    PLAN:    maintain isopreterenol infusion for rhtyhm control  -goal K+ 4-4.2  -mag >2  -as per sign out if aptient developed worsened or persistent vtach episodes despite isurpel, would require TVP wires with overdrive pacing  -EP to follow,   -contine steroids for Hc of ITP (PLT 142K this AM)

## 2020-03-01 NOTE — DIETITIAN INITIAL EVALUATION ADULT. - OTHER INFO
29 year old male with ITP, MICHELLE on CPAP, paroxysmal AF, and idiopathic VF s/p S-ICD who presents with multiple ICD shocks, this am (1/29) with recurrent episodes of VF. Pt reported good po intake PTA with no recent wt changes. Pt reports not following any diet PTA, provided with brief general nutrition education and educated on importance of HBV protein. Pt has no current nutrition questions or concerns. Last documented BM 2/29.

## 2020-03-01 NOTE — DIETITIAN INITIAL EVALUATION ADULT. - PERTINENT LABORATORY DATA
03-01 Na137 mmol/L Glu 147 mg/dL<H> K+ 4.5 mmol/L Cr  0.60 mg/dL BUN 17.0 mg/dL Phos 2.2 mg/dL<L> Alb n/a   PAB n/a

## 2020-03-01 NOTE — PROGRESS NOTE ADULT - ASSESSMENT
In summary 29 year old male with ITP (on steroids, followed by Dr. Lesvia eY), MICHELLE on CPAP, paroxysmal AF, and idiopathic VF s/p S-ICD who presents with multiple ICD shocks. s/p initiation of isuprel drip at 2 which has decreased arrhythmias    Kamaljit fib/vent tachycardia preceded by ? malignant PVC  For ablation in am  NPO after 12 am  Per Dr Barkley Ep  If v fib reoccurs float a temporary wire and pace at 90  will not increase isuprel at this time to avoid atrial fib  Keep Mg >2.5 and K >4  Continue ICU monitoring  Discussed with PA in ICU

## 2020-03-01 NOTE — PROGRESS NOTE ADULT - SUBJECTIVE AND OBJECTIVE BOX
Patient is a 29y old  Male who presents with a chief complaint of SICD firing (29 Feb 2020 09:50)      BRIEF HOSPITAL COURSE:     29M w/ subcutaneous ICD, Hx of sudden cardiac death secndary to arrythmia. Admitted to ICU with multiple ICD firings, noted to be in polymorphic vtach required isoproterenol drip for control    Events last 24 hours:   -remains on isoproterenol  -had 1 self limited and brief episode of vtach overnight,    PAST MEDICAL & SURGICAL HISTORY:  History of ITP: 12/2019 s/p oral steroid tx with normalization of platelet count  Atrial fibrillation  Obstructive sleep apnea: CPAP  History of ventricular fibrillation: last ICD shock 8/2019  S/P ICD (internal cardiac defibrillator) procedure: SQ ICD s/p 4 revisions last 9/2019      Review of Systems:  CONSTITUTIONAL: No fever, chills, or fatigue  EYES: No eye pain, visual disturbances, or discharge  ENMT:  No difficulty hearing, tinnitus, vertigo; No sinus or throat pain  NECK: No pain or stiffness  RESPIRATORY: No cough, wheezing, chills or hemoptysis; No shortness of breath  CARDIOVASCULAR: No chest pain, palpitations, dizziness, or leg swelling  GASTROINTESTINAL: No abdominal or epigastric pain. No nausea, vomiting, or hematemesis; No diarrhea or constipation. No melena or hematochezia.  GENITOURINARY: No dysuria, frequency, hematuria, or incontinence  NEUROLOGICAL: No headaches, memory loss, loss of strength, numbness, or tremors  SKIN: No itching, burning, rashes, or lesions   MUSCULOSKELETAL: No joint pain or swelling; No muscle, back, or extremity pain  PSYCHIATRIC: No depression, anxiety, mood swings, or difficulty sleeping      Medications:    isoproterenol Infusion 2 MICROgram(s)/Min IV Continuous <Continuous>                predniSONE   Tablet 50 milliGRAM(s) Oral daily                  ICU Vital Signs Last 24 Hrs  T(C): 36.8 (29 Feb 2020 19:00), Max: 37.1 (29 Feb 2020 09:30)  T(F): 98.3 (29 Feb 2020 19:00), Max: 98.7 (29 Feb 2020 09:30)  HR: 66 (01 Mar 2020 04:30) (56 - 105)  BP: 111/57 (01 Mar 2020 04:30) (81/41 - 152/65)  BP(mean): 78 (01 Mar 2020 04:30) (59 - 93)  RR: 18 (29 Feb 2020 19:00) (15 - 33)  SpO2: 97% (01 Mar 2020 04:30) (94% - 99%)          I&O's Detail    29 Feb 2020 07:01  -  01 Mar 2020 04:57  --------------------------------------------------------  IN:    isoproterenol Infusion: 495 mL    Oral Fluid: 240 mL  Total IN: 735 mL    OUT:    Voided: 350 mL  Total OUT: 350 mL    Total NET: 385 mL            LABS:                        13.6   7.90  )-----------( 142      ( 01 Mar 2020 03:12 )             41.8     03-01    137  |  103  |  17.0  ----------------------------<  147<H>  4.5   |  23.0  |  0.60    Ca    8.8      01 Mar 2020 03:12  Phos  2.2     03-01  Mg     2.3     03-01    TPro  7.2  /  Alb  4.5  /  TBili  1.1  /  DBili  x   /  AST  25  /  ALT  30  /  AlkPhos  58  02-28      CARDIAC MARKERS ( 28 Feb 2020 09:50 )  x     / <0.01 ng/mL / x     / x     / x          CAPILLARY BLOOD GLUCOSE      POCT Blood Glucose.: 86 mg/dL (29 Feb 2020 09:39)        CULTURES:      Physical Examination:    General: No acute distress.  Alert, oriented, interactive, nonfocal    HEENT: Pupils equal, reactive to light.  Symmetric.    PULM: Clear to auscultation bilaterally, no significant sputum production    CVS: Regular rate and rhythm, no murmurs, rubs, or gallops    ABD: Soft, nondistended, nontender, normoactive bowel sounds, no masses    EXT: No edema, nontender    SKIN: Warm and well perfused, no rashes noted.

## 2020-03-01 NOTE — PROGRESS NOTE ADULT - SUBJECTIVE AND OBJECTIVE BOX
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  Pt admitted to hospital due to multiple shocks by Subcutaneous ICD.  Pt found to be in polymorphic VT/VF and has received multiple shocks.  He was initially admitted to telemetry with plan for ablation on Monday but pt continues to have long runs VT and has been receiving shocks this  morning. Pt was urgently moved to ICU yesterday and placed on Isoprel gtt as per Dr Barkley/[facility]IL[facility]ERT[facility] cardio.  Isoprel gtt titrated to 2mg/min.  Pt has not received shock since the drip started.  He has had a few short episodes of self limited Vtach.  Today pt feeling well, awaiting Vtach ablation.  Pt will remain in ICU on drip and if any further episodes of shocks/VT pt will have an emergent TVP wire placed.  Patient is a 29y old  Male who presents with a chief complaint of SICD firing (01 Mar 2020 04:57)      BRIEF HOSPITAL COURSE: as above    Events last 24 hours: VT storm    PAST MEDICAL & SURGICAL HISTORY:  History of ITP: 12/2019 s/p oral steroid tx with normalization of platelet count  Atrial fibrillation  Obstructive sleep apnea: CPAP  History of ventricular fibrillation: last ICD shock 8/2019  S/P ICD (internal cardiac defibrillator) procedure: SQ ICD s/p 4 revisions last 9/2019      Review of Systems:  CONSTITUTIONAL: No fever, chills, or fatigue  EYES: No eye pain, visual disturbances, or discharge  ENMT:  No difficulty hearing, tinnitus, vertigo; No sinus or throat pain  NECK: No pain or stiffness  RESPIRATORY: No cough, wheezing, chills or hemoptysis; No shortness of breath  CARDIOVASCULAR: No chest pain, palpitations, dizziness, or leg swelling  GASTROINTESTINAL: No abdominal or epigastric pain. No nausea, vomiting, or hematemesis; No diarrhea or constipation. No melena or hematochezia.  GENITOURINARY: No dysuria, frequency, hematuria, or incontinence  NEUROLOGICAL: No headaches, memory loss, loss of strength, numbness, or tremors  SKIN: No itching, burning, rashes, or lesions   MUSCULOSKELETAL: No joint pain or swelling; No muscle, back, or extremity pain  PSYCHIATRIC: No depression, anxiety, mood swings, or difficulty sleeping      Medications:    isoproterenol Infusion 2 MICROgram(s)/Min IV Continuous <Continuous>                predniSONE   Tablet 50 milliGRAM(s) Oral daily                  ICU Vital Signs Last 24 Hrs  T(C): 36.8 (01 Mar 2020 08:00), Max: 36.9 (01 Mar 2020 06:00)  T(F): 98.3 (01 Mar 2020 08:00), Max: 98.4 (01 Mar 2020 06:00)  HR: 70 (01 Mar 2020 09:00) (56 - 89)  BP: 121/61 (01 Mar 2020 09:00) (81/41 - 152/65)  BP(mean): 82 (01 Mar 2020 09:00) (59 - 103)  ABP: --  ABP(mean): --  RR: 20 (01 Mar 2020 09:00) (15 - 30)  SpO2: 99% (01 Mar 2020 09:00) (94% - 100%)                LABS:                        13.6   7.90  )-----------( 142      ( 01 Mar 2020 03:12 )             41.8     03-01    137  |  103  |  17.0  ----------------------------<  147<H>  4.5   |  23.0  |  0.60    Ca    8.8      01 Mar 2020 03:12  Phos  2.2     03-01  Mg     2.3     03-01            CAPILLARY BLOOD GLUCOSE      POCT Blood Glucose.: 86 mg/dL (29 Feb 2020 09:39)        CULTURES:      Physical Examination:    General: No acute distress.  Alert, oriented, interactive, nonfocal    HEENT: Pupils equal, reactive to light.  Symmetric.    PULM: Clear to auscultation bilaterally, no significant sputum production    CVS: Regular rate and rhythm, no murmurs, rubs, or gallops    ABD: Soft, nondistended, nontender, normoactive bowel sounds, no masses    EXT: No edema, nontender    SKIN: Warm and well perfused, no rashes noted.    RADIOLOGY: images reviewed    CRITICAL CARE TIME SPENT: ***

## 2020-03-01 NOTE — PROGRESS NOTE ADULT - SUBJECTIVE AND OBJECTIVE BOX
[facility]  [address], [place] [address]    REASON FOR VISIT:  Follow up on ventricular arrhytmias  UPDATE:   Patient feels ok but does feel slight palpitations with arrhytmia  TELEMETRY MONITORING:  Nsr with ventricular fib/ventricular tachycardia    03-01    137  |  103  |  17.0  ----------------------------<  147<H>  4.5   |  23.0  |  0.60    Ca    8.8      01 Mar 2020 03:12  Phos  2.2     03-01  Mg     2.3     03-01 02-29-20 @ 07:01  -  03-01-20 @ 07:00  --------------------------------------------------------  IN: 949 mL / OUT: 350 mL / NET: 599 mL    03-01-20 @ 07:01 - 03-01-20 @ 13:15  --------------------------------------------------------  IN: 90 mL / OUT: 0 mL / NET: 90 mL    MEDICATIONS  (STANDING):  isoproterenol Infusion 2 MICROgram(s)/Min (30 mL/Hr) IV Continuous <Continuous>  magnesium sulfate  IVPB 1 Gram(s) IV Intermittent once  predniSONE   Tablet 50 milliGRAM(s) Oral daily    ROS:  No fever chills  Cardiac  No cp sob or + palpitaiton feeling when arrhythmia occurs  Resp  no cough no mucus production  Gi  no abd pain no melana  Ext No calf tenderness, no edema    Vital Signs Last 24 Hrs  T(C): 37.2 (01 Mar 2020 12:00), Max: 37.2 (01 Mar 2020 12:00)  T(F): 99 (01 Mar 2020 12:00), Max: 99 (01 Mar 2020 12:00)  HR: 77 (01 Mar 2020 11:00) (56 - 98)  BP: 117/66 (01 Mar 2020 11:00) (81/41 - 152/65)  BP(mean): 85 (01 Mar 2020 11:00) (59 - 103)  RR: 21 (01 Mar 2020 11:00) (15 - 21)  SpO2: 97% (01 Mar 2020 11:00) (94% - 100%)  T(C): 37.2 (03-01-20 @ 12:00), Max: 37.2 (03-01-20 @ 12:00)  HR: 77 (03-01-20 @ 11:00) (56 - 98)  BP: 117/66 (03-01-20 @ 11:00) (81/41 - 152/65)  RR: 21 (03-01-20 @ 11:00) (15 - 21)  SpO2: 97% (03-01-20 @ 11:00) (94% - 100%)    HEENT Head atraumatic eomi, oral mucosa moist  CV S1&S2  regular   RESP  CTA  GI  Soft active bowel sounds non tender  EXT  No clubbing/Cyanosis /Edema  NEURO  Alert oriented  No gross motor or sensory deficits

## 2020-03-02 ENCOUNTER — NON-APPOINTMENT (OUTPATIENT)
Age: 30
End: 2020-03-02

## 2020-03-02 LAB
ANION GAP SERPL CALC-SCNC: 12 MMOL/L — SIGNIFICANT CHANGE UP (ref 5–17)
APTT BLD: 27 SEC — LOW (ref 27.5–36.3)
BUN SERPL-MCNC: 12 MG/DL — SIGNIFICANT CHANGE UP (ref 8–20)
CALCIUM SERPL-MCNC: 9.1 MG/DL — SIGNIFICANT CHANGE UP (ref 8.6–10.2)
CHLORIDE SERPL-SCNC: 100 MMOL/L — SIGNIFICANT CHANGE UP (ref 98–107)
CO2 SERPL-SCNC: 25 MMOL/L — SIGNIFICANT CHANGE UP (ref 22–29)
CREAT SERPL-MCNC: 0.61 MG/DL — SIGNIFICANT CHANGE UP (ref 0.5–1.3)
GLUCOSE SERPL-MCNC: 132 MG/DL — HIGH (ref 70–99)
HCT VFR BLD CALC: 42.1 % — SIGNIFICANT CHANGE UP (ref 39–50)
HGB BLD-MCNC: 13.8 G/DL — SIGNIFICANT CHANGE UP (ref 13–17)
INR BLD: 0.95 RATIO — SIGNIFICANT CHANGE UP (ref 0.88–1.16)
MAGNESIUM SERPL-MCNC: 2.4 MG/DL — SIGNIFICANT CHANGE UP (ref 1.6–2.6)
MCHC RBC-ENTMCNC: 29.5 PG — SIGNIFICANT CHANGE UP (ref 27–34)
MCHC RBC-ENTMCNC: 32.8 GM/DL — SIGNIFICANT CHANGE UP (ref 32–36)
MCV RBC AUTO: 90 FL — SIGNIFICANT CHANGE UP (ref 80–100)
PHOSPHATE SERPL-MCNC: 2.9 MG/DL — SIGNIFICANT CHANGE UP (ref 2.4–4.7)
PLATELET # BLD AUTO: 136 K/UL — LOW (ref 150–400)
POTASSIUM SERPL-MCNC: 4.3 MMOL/L — SIGNIFICANT CHANGE UP (ref 3.5–5.3)
POTASSIUM SERPL-SCNC: 4.3 MMOL/L — SIGNIFICANT CHANGE UP (ref 3.5–5.3)
PROTHROM AB SERPL-ACNC: 10.7 SEC — SIGNIFICANT CHANGE UP (ref 10–12.9)
RBC # BLD: 4.68 M/UL — SIGNIFICANT CHANGE UP (ref 4.2–5.8)
RBC # FLD: 14 % — SIGNIFICANT CHANGE UP (ref 10.3–14.5)
SODIUM SERPL-SCNC: 137 MMOL/L — SIGNIFICANT CHANGE UP (ref 135–145)
WBC # BLD: 7.65 K/UL — SIGNIFICANT CHANGE UP (ref 3.8–10.5)
WBC # FLD AUTO: 7.65 K/UL — SIGNIFICANT CHANGE UP (ref 3.8–10.5)

## 2020-03-02 PROCEDURE — 93320 DOPPLER ECHO COMPLETE: CPT | Mod: 26

## 2020-03-02 PROCEDURE — 99291 CRITICAL CARE FIRST HOUR: CPT

## 2020-03-02 PROCEDURE — 93654 COMPRE EP EVAL TX VT: CPT

## 2020-03-02 PROCEDURE — 99233 SBSQ HOSP IP/OBS HIGH 50: CPT | Mod: 25

## 2020-03-02 PROCEDURE — 93662 INTRACARDIAC ECG (ICE): CPT | Mod: 26

## 2020-03-02 PROCEDURE — 93325 DOPPLER ECHO COLOR FLOW MAPG: CPT | Mod: 26

## 2020-03-02 PROCEDURE — 93010 ELECTROCARDIOGRAM REPORT: CPT

## 2020-03-02 PROCEDURE — 93312 ECHO TRANSESOPHAGEAL: CPT | Mod: 26

## 2020-03-02 PROCEDURE — 93655 ICAR CATH ABLTJ DSCRT ARRHYT: CPT

## 2020-03-02 RX ORDER — OXYCODONE AND ACETAMINOPHEN 5; 325 MG/1; MG/1
1 TABLET ORAL EVERY 6 HOURS
Refills: 0 | Status: DISCONTINUED | OUTPATIENT
Start: 2020-03-02 | End: 2020-03-04

## 2020-03-02 RX ORDER — BENZOCAINE AND MENTHOL 5; 1 G/100ML; G/100ML
1 LIQUID ORAL EVERY 6 HOURS
Refills: 0 | Status: DISCONTINUED | OUTPATIENT
Start: 2020-03-02 | End: 2020-03-04

## 2020-03-02 RX ORDER — METOPROLOL TARTRATE 50 MG
50 TABLET ORAL ONCE
Refills: 0 | Status: COMPLETED | OUTPATIENT
Start: 2020-03-02 | End: 2020-03-02

## 2020-03-02 RX ORDER — ACETAMINOPHEN 500 MG
650 TABLET ORAL EVERY 6 HOURS
Refills: 0 | Status: DISCONTINUED | OUTPATIENT
Start: 2020-03-02 | End: 2020-03-04

## 2020-03-02 RX ORDER — ENOXAPARIN SODIUM 100 MG/ML
110 INJECTION SUBCUTANEOUS
Refills: 0 | Status: DISCONTINUED | OUTPATIENT
Start: 2020-03-02 | End: 2020-03-04

## 2020-03-02 RX ORDER — APIXABAN 2.5 MG/1
5 TABLET, FILM COATED ORAL
Refills: 0 | Status: DISCONTINUED | OUTPATIENT
Start: 2020-03-02 | End: 2020-03-02

## 2020-03-02 RX ORDER — QUINIDINE SULFATE 200 MG
200 TABLET ORAL EVERY 8 HOURS
Refills: 0 | Status: DISCONTINUED | OUTPATIENT
Start: 2020-03-02 | End: 2020-03-04

## 2020-03-02 RX ORDER — METOPROLOL TARTRATE 50 MG
100 TABLET ORAL
Refills: 0 | Status: DISCONTINUED | OUTPATIENT
Start: 2020-03-02 | End: 2020-03-04

## 2020-03-02 RX ADMIN — Medication 40 MILLIGRAM(S): at 18:57

## 2020-03-02 RX ADMIN — Medication 50 MILLIGRAM(S): at 18:57

## 2020-03-02 RX ADMIN — Medication 100 MILLIGRAM(S): at 21:26

## 2020-03-02 RX ADMIN — Medication 200 MILLIGRAM(S): at 21:26

## 2020-03-02 RX ADMIN — ENOXAPARIN SODIUM 110 MILLIGRAM(S): 100 INJECTION SUBCUTANEOUS at 21:24

## 2020-03-02 NOTE — PROGRESS NOTE ADULT - SUBJECTIVE AND OBJECTIVE BOX
[place] [facility]                                                               Office: [address] [address]                                                              Telephone: [phone]. Fax:[phone]                                                                             PROGRESS NOTE  Reason for follow up: VF/VT  Overnight: No new events.   Update: As of 3/2/2020, pt presents without any complaints of chest pain/pressure, palpitations, dizziness, lightheadedness, dyspnea, orthopnea, PND, peripheral edema, vision/hearing changes, nausea, vomiting diarrhea, dysuria, hematuria. Pt is s/p EPS and empiric radiofrequency ablation of moderator band, ablation of lateral right atrial appendage. Pt had no complications during procedure. ECTOR thrombus found on pre op NEVAEH.     Subjective: " I dont have any chest pain, or palpitations"    Dio Venegas is a 29 year old male with ITP (on steroids, followed by Dr. Lesvia Ye), MICHELLE on CPAP, paroxysmal AF, and idiopathic VF s/p S-ICD who presents after getting multiple ICD shocks at home.    The patient was in his usual state of health until Wednesday night. His wife notes that she woke up to him appearing stiff, shaking (almost as if seizing), eyes rolling back into his head and then arching his back. He then awoke and asked "what happened." He felt OK otherwise and went back to bed. He had another shock later that night. On Thursday the EP office received a transmission that he had received 2 shocks for VF. These were suggestive of long-short sequences but the "long" sequence was about 60 bpm. He also had one episode of non-sustained monomorphic VT. He was recommended to get STAT blood work and change to Nadolol but when he went to the pharmacy they did not have it. He took his usual dose of Metoprolol Succinate 200mg daily last night again. This early morning he again had an ICD shock for VF. The office received his report of VF and he was told to present to the ER. He only notes that his left lower back and inner thigh discomfort described as "burning."    As of 3/2/2020, pt presents without any complaints of chest pain/pressure, palpitations, dizziness, lightheadedness, dyspnea, orthopnea, PND, peripheral edema, vision/hearing changes, nausea, vomiting diarrhea, dysuria, hematuria. Pt is s/p EPS and empiric radiofrequency ablation of moderator band, ablation of lateral right atrial appendage. Pt had no complications during procedure. ECTOR thrombus found on pre op NEVAEH.     	  Vitals:  T(C): 36.7 (03-02-20 @ 08:30), Max: 37.1 (03-01-20 @ 20:00)  HR: 90 (03-02-20 @ 17:15) (70 - 90)  BP: 128/76 (03-02-20 @ 16:15) (93/54 - 138/73)  RR: 18 (03-02-20 @ 17:15) (12 - 18)  SpO2: 98% (03-02-20 @ 17:15) (94% - 99%)    I&O's Summary    01 Mar 2020 07:01  -  02 Mar 2020 07:00  --------------------------------------------------------  IN: 840 mL / OUT: 0 mL / NET: 840 mL      Weight (kg): 104.3 (03-02 @ 08:30)      PHYSICAL EXAM:  Appearance: Comfortable. No acute distress  HEENT:  Head and neck: Atraumatic. Normocephalic.  Normal oral mucosa, PERRL, Neck is supple. No JVD, No carotid bruit.   Neurologic: A & O x 3, no focal deficits. EOMI.  Lymphatic: No cervical lymphadenopathy  Cardiovascular: Normal S1 S2, No murmur, rubs/gallops. No JVD, No edema  Respiratory: Lungs clear to auscultation  Gastrointestinal:  Soft, Non-tender, + BS  Lower Extremities: No edema  Psychiatry: Patient is calm. No agitation. Mood & affect appropriate  Skin: No rashes/ ecchymoses/cyanosis/ulcers visualized on the face, hands or feet.      CURRENT MEDICATIONS:  isoproterenol Infusion 2 MICROgram(s)/Min IV Continuous <Continuous>  metoprolol succinate  milliGRAM(s) Oral daily  quiNIDine sulfate 200 milliGRAM(s) Oral every 8 hours  predniSONE   Tablet  enoxaparin Injectable      DIAGNOSTIC TESTING:  Stress Test: 3/20/2017: No ischemia. No symptoms. Narrow complex and wide complex tachycardia in recovery suggestive of SVT and NSVT.    Cardiac MRI: 12/2013: Normal biventricular function. No LGE.    Genetic Testing 2019: VUS noted in ANK2 and SCN5A genes    NEVAEH 3/2/2020: ECTOR Thrombus     OTHER:  xray: < from: Xray Chest 2 Views PA/Lat (02.28.20 @ 10:59) >  IMPRESSION: Unchanged chest showing subcutaneous defibrillator.    < end of copied text >    LABS:	 	                            13.8   7.65  )-----------( 136      ( 02 Mar 2020 05:21 )             42.1     03-02    137  |  100  |  12.0  ----------------------------<  132<H>  4.3   |  25.0  |  0.61    Ca    9.1      02 Mar 2020 05:22  Phos  2.9     03-02  Mg     2.4     03-02      TELE: NSR HR 60s-70s with occaisonal PACs

## 2020-03-02 NOTE — PROGRESS NOTE ADULT - ASSESSMENT
Dio Venegas is a 29 year old male with ITP (on steroids, followed by Dr. Lesvia Ye), MICHELLE on CPAP, paroxysmal AF, and idiopathic VF s/p S-ICD who presents after getting multiple ICD shocks at home.    The patient was in his usual state of health until Wednesday night. His wife notes that she woke up to him appearing stiff, shaking (almost as if seizing), eyes rolling back into his head and then arching his back. He then awoke and asked "what happened." He felt OK otherwise and went back to bed. He had another shock later that night. On Thursday the EP office received a transmission that he had received 2 shocks for VF. These were suggestive of long-short sequences but the "long" sequence was about 60 bpm. He also had one episode of non-sustained monomorphic VT. He was recommended to get STAT blood work and change to Nadolol but when he went to the pharmacy they did not have it. He took his usual dose of Metoprolol Succinate 200mg daily last night again. This early morning he again had an ICD shock for VF. The office received his report of VF and he was told to present to the ER. He only notes that his left lower back and inner thigh discomfort described as "burning."    As of 3/2/2020, pt presents without any complaints of chest pain/pressure, palpitations, dizziness, lightheadedness, dyspnea, orthopnea, PND, peripheral edema, vision/hearing changes, nausea, vomiting diarrhea, dysuria, hematuria. Pt is s/p EPS and empiric radiofrequency ablation of moderator band, ablation of lateral right atrial appendage. Pt had no complications during procedure. ECTRO thrombus found on pre op NEVAEH.     VT/VF  -Pt s/p successful moderator band ablation  As per EP recommendations:   "-start Lovenox weight based SC q12h. Transition to Eliquis 5mg PO BID for ECTOR thrombus seen on NEVAEH prior to discharge  -Start Toprol XL 100mg  -Quinidine 200mg PO q8h" Dio Venegas is a 29 year old male with ITP (on steroids, followed by Dr. Lesvia Ye), MICHELLE on CPAP, paroxysmal AF, and idiopathic VF s/p S-ICD who presents after getting multiple ICD shocks at home.    The patient was in his usual state of health until Wednesday night. His wife notes that she woke up to him appearing stiff, shaking (almost as if seizing), eyes rolling back into his head and then arching his back. He then awoke and asked "what happened." He felt OK otherwise and went back to bed. He had another shock later that night. On Thursday the EP office received a transmission that he had received 2 shocks for VF. These were suggestive of long-short sequences but the "long" sequence was about 60 bpm. He also had one episode of non-sustained monomorphic VT. He was recommended to get STAT blood work and change to Nadolol but when he went to the pharmacy they did not have it. He took his usual dose of Metoprolol Succinate 200mg daily last night again. This early morning he again had an ICD shock for VF. The office received his report of VF and he was told to present to the ER. He only notes that his left lower back and inner thigh discomfort described as "burning."    As of 3/2/2020, pt presents without any complaints of chest pain/pressure, palpitations, dizziness, lightheadedness, dyspnea, orthopnea, PND, peripheral edema, vision/hearing changes, nausea, vomiting diarrhea, dysuria, hematuria. Pt is s/p EPS and empiric radiofrequency ablation of moderator band, ablation of lateral right atrial appendage. Pt had no complications during procedure. ECTOR thrombus found on pre op NEVAEH.     VT/VF  -Pt s/p successful moderator band ablation  As per EP recommendations:   "-start Lovenox weight based SC q12h. Transition to Eliquis 5mg PO BID for ECTOR thrombus seen on NEVAEH prior to discharge  -Start Toprol XL 100mg  -Quinidine 200mg PO q8h"  - Monitor on telemetry  - Continue Metoprolol Succinate 100mg daily

## 2020-03-02 NOTE — PROGRESS NOTE ADULT - ASSESSMENT
28 y/o M with a h/o ITP on prednisone, sudden cardiac death in 2013, with VT storm.    - continue isoproterenol infusion at current dose rate  - frequent ectopy, PACs, PVCs, now with some narrow complex irregular tachyarrhythmias- possibly secondary to isoproterenol   - no BB as per cardiology  - if develops recurrent sustained ventricular arrhythmia will place TVP wire for overdrive pacing  - Zoll pads in place and monitor at bedside for precaution  - NPO for VT ablation later this morning

## 2020-03-02 NOTE — PROGRESS NOTE ADULT - SUBJECTIVE AND OBJECTIVE BOX
Patient is a 29y old  Male who presents with a chief complaint of SICD firing (02 Mar 2020 16:32)    BRIEF HOSPITAL COURSE:   29 YOM h/o ITP on prednisone, h/o sudden cardiac death in 2013 has had AICD since that time.  Pt admitted to hospital due to multiple shocks by Subcutaneous ICD.  Pt found to be in polymorphic VT/VF and has received multiple shocks.  He was initially admitted to telemetry with plan for ablation on Monday but pt continues to have long runs VT and has been receiving shocks this  morning. Pt was urgently moved to ICU yesterday and placed on Isoprel gtt as per Dr Barkley/EP cardio.  Isoprel gtt titrated to 2mg/min.  Pt has not received shock since the drip started.  He has had a few short episodes of self limited Vtach.     Events last 24 hours:   S/p EPS and RFA of moderator band and right atrial appendage. No complaints. Denies SOB or groin pain/ swelling    PAST MEDICAL & SURGICAL HISTORY:  History of ITP: 12/2019 s/p oral steroid tx with normalization of platelet count  Atrial fibrillation  Obstructive sleep apnea: CPAP  History of ventricular fibrillation: last ICD shock 8/2019  S/P ICD (internal cardiac defibrillator) procedure: SQ ICD s/p 4 revisions last 9/2019    Review of Systems:  CONSTITUTIONAL: No fever, chills, or fatigue  EYES: No eye pain, visual disturbances, or discharge  ENMT:  No difficulty hearing, tinnitus, vertigo; No sinus or throat pain  NECK: No pain or stiffness  RESPIRATORY: No cough, wheezing, chills or hemoptysis; No shortness of breath  CARDIOVASCULAR: No chest pain, palpitations, dizziness, or leg swelling  GASTROINTESTINAL: No abdominal or epigastric pain. No nausea, vomiting, or hematemesis; No diarrhea or constipation. No melena or hematochezia.  GENITOURINARY: No dysuria, frequency, hematuria, or incontinence  NEUROLOGICAL: No headaches, memory loss, loss of strength, numbness, or tremors  SKIN: No itching, burning, rashes, or lesions   MUSCULOSKELETAL: No joint pain or swelling; No muscle, back, or extremity pain  PSYCHIATRIC: No depression, anxiety, mood swings, or difficulty sleeping    Physical Examination:    ICU Vital Signs Last 24 Hrs  T(C): 36.7 (02 Mar 2020 08:30), Max: 37.1 (01 Mar 2020 20:00)  T(F): 98 (02 Mar 2020 08:30), Max: 98.7 (01 Mar 2020 20:00)  HR: 90 (02 Mar 2020 17:15) (70 - 90)  BP: 128/76 (02 Mar 2020 16:15) (93/54 - 138/73)  BP(mean): 76 (02 Mar 2020 07:00) (68 - 86)  ABP: 147/78 (02 Mar 2020 16:45) (135/69 - 147/78)  ABP(mean): --  RR: 18 (02 Mar 2020 17:15) (12 - 18)  SpO2: 98% (02 Mar 2020 17:15) (94% - 99%)      General: No acute distress.      Neuro: AAO*3, No motor, sensory, or cranial nerve deficit    HEENT: Pupils equal, reactive to light, Moist oral mucosa    PULM: Clear to auscultation bilaterally, no significant adventitious breath sounds     CVS: Regular rhythm and controlled rate, no murmurs, rubs, or gallops    ABD: Soft, nondistended, nontender, normoactive bowel sounds    EXT: No b/l LE edema, nontender with pedal pulse palpable     SKIN: Warm and well perfused, no acute rashes       Medications:    isoproterenol Infusion 2 MICROgram(s)/Min IV Continuous <Continuous>  metoprolol succinate  milliGRAM(s) Oral daily  quiNIDine sulfate 200 milliGRAM(s) Oral every 8 hours      acetaminophen   Tablet .. 650 milliGRAM(s) Oral every 6 hours PRN  oxycodone    5 mG/acetaminophen 325 mG 1 Tablet(s) Oral every 6 hours PRN      enoxaparin Injectable 110 milliGRAM(s) SubCutaneous <User Schedule>    aluminum hydroxide/magnesium hydroxide/simethicone Suspension 30 milliLiter(s) Oral every 6 hours PRN      predniSONE   Tablet 40 milliGRAM(s) Oral daily        benzocaine 15 mG/menthol 3.6 mG (Sugar-Free) Lozenge 1 Lozenge Oral every 6 hours PRN            I&O's Detail    01 Mar 2020 07:01  -  02 Mar 2020 07:00  --------------------------------------------------------  IN:    isoproterenol Infusion: 720 mL    Oral Fluid: 120 mL  Total IN: 840 mL    OUT:  Total OUT: 0 mL    Total NET: 840 mL            RADIOLOGY/ Microbiology/ Labs: reviewed

## 2020-03-02 NOTE — PROGRESS NOTE ADULT - ASSESSMENT
VT storm s/p ablation   ECTOR thrombus  ITP on prednisone  H/o sudden cardiac death s/p ICD    Neuro: No active issues  Cardiovascular: S/p ablation today. Started on beta-blockers and Quinidine as per EP. Monitor platelets. Also on Lovenox. Plan to transition to Eliquis if no further intervention deemed necessary as per Cards  Resp/Chest: Maintain SpO2 > 92%  GI/Nutrition: Regular diet  ID: No active issues   Renal: Avoid nephrotoxic agents. Strict I&O  Endocrinology: On steroids for ITP, Maintain Blood sugar < 180. ISS as per protocol  Haem/Oncology:  DVT ppx w/ Lovenox    Critical Care time: 35 minutes assessing presenting problems of acute illness that poses high probability of life threatening deterioration or end organ damage/dysfunction.  Medical decision making including Initiating plan of care, reviewing data, reviewing radiology, discussing with multidisciplinary team, non inclusive of procedures, discussing goals of care with patient/family

## 2020-03-02 NOTE — PROGRESS NOTE ADULT - SUBJECTIVE AND OBJECTIVE BOX
Patient is a 29y old  Male who presents with a chief complaint of SICD firing (01 Mar 2020 13:15)      BRIEF HOSPITAL COURSE: 30 y/o M with a h/o ITP on prednisone, sudden cardiac death in 2013, has had AICD since that time, admitted on 2/28 for multiple shocks by SQ ICD. Noted to be in polymorphic VT/VF and has received multiple shocks in hospital. Moved to MICU on 2/29 and started on isoproterenol infusion for arrhythmia suppression. Has had periodic NSVT.      Events last 24 hours: Patient continues to have intermittent PACs, PVCs, narrow complex tachyarrhythmias. Plan for VT ablation later today.       PAST MEDICAL & SURGICAL HISTORY:  History of ITP: 12/2019 s/p oral steroid tx with normalization of platelet count  Atrial fibrillation  Obstructive sleep apnea: CPAP  History of ventricular fibrillation: last ICD shock 8/2019  S/P ICD (internal cardiac defibrillator) procedure: SQ ICD s/p 4 revisions last 9/2019      Review of Systems:  CONSTITUTIONAL: No fever, chills, or fatigue  EYES: No eye pain, visual disturbances, or discharge  ENMT:  No difficulty hearing, tinnitus, vertigo; No sinus or throat pain  NECK: No pain or stiffness  RESPIRATORY: No cough, wheezing, chills or hemoptysis; No shortness of breath  CARDIOVASCULAR: No chest pain, palpitations, dizziness, or leg swelling  GASTROINTESTINAL: No abdominal or epigastric pain. No nausea, vomiting, or hematemesis; No diarrhea or constipation. No melena or hematochezia.  GENITOURINARY: No dysuria, frequency, hematuria, or incontinence  NEUROLOGICAL: No headaches, memory loss, loss of strength, numbness, or tremors  SKIN: No itching, burning, rashes, or lesions   MUSCULOSKELETAL: No joint pain or swelling; No muscle, back, or extremity pain  PSYCHIATRIC: No depression, anxiety, mood swings, or difficulty sleeping      Medications:    isoproterenol Infusion 2 MICROgram(s)/Min IV Continuous <Continuous>  predniSONE   Tablet 40 milliGRAM(s) Oral daily        ICU Vital Signs Last 24 Hrs  T(C): 36.8 (01 Mar 2020 23:30), Max: 37.2 (01 Mar 2020 12:00)  T(F): 98.2 (01 Mar 2020 23:30), Max: 99 (01 Mar 2020 12:00)  HR: 80 (01 Mar 2020 23:00) (65 - 98)  BP: 113/63 (01 Mar 2020 23:00) (81/41 - 152/65)  BP(mean): 80 (01 Mar 2020 23:00) (59 - 103)  ABP: --  ABP(mean): --  RR: 15 (01 Mar 2020 23:00) (14 - 21)  SpO2: 96% (01 Mar 2020 23:00) (94% - 100%)          I&O's Detail    29 Feb 2020 07:01  -  01 Mar 2020 07:00  --------------------------------------------------------  IN:    isoproterenol Infusion: 585 mL    Oral Fluid: 240 mL    Solution: 124 mL  Total IN: 949 mL    OUT:    Voided: 350 mL  Total OUT: 350 mL    Total NET: 599 mL      01 Mar 2020 07:01  -  02 Mar 2020 00:32  --------------------------------------------------------  IN:    isoproterenol Infusion: 510 mL  Total IN: 510 mL    OUT:  Total OUT: 0 mL    Total NET: 510 mL            LABS:                        13.6   7.90  )-----------( 142      ( 01 Mar 2020 03:12 )             41.8     03-01    137  |  103  |  17.0  ----------------------------<  147<H>  4.5   |  23.0  |  0.60    Ca    8.8      01 Mar 2020 03:12  Phos  2.2     03-01  Mg     2.3     03-01            CAPILLARY BLOOD GLUCOSE      POCT Blood Glucose.: 86 mg/dL (29 Feb 2020 09:39)        CULTURES:        Physical Examination:    General: No acute distress.  Alert, oriented, interactive, nonfocal    HEENT: Pupils equal, reactive to light.  Symmetric.    PULM: Clear to auscultation bilaterally, no significant sputum production    CVS: Regular rate and rhythm, frequent ectopy, no murmurs, rubs, or gallops    ABD: Soft, nondistended, nontender, normoactive bowel sounds, no masses    EXT: No edema, nontender    SKIN: Warm and well perfused, no rashes noted.    NEURO: A&Ox3, strength 5/5 all extremities, cranial nerves grossly intact, no focal deficits      RADIOLOGY:     < from: Xray Chest 2 Views PA/Lat (02.28.20 @ 10:59) >  Heart size is within normal limits.    Left subcutaneous defibrillator again noted.    Lung fields and pleural surfaces are unremarkable.    Chest is similar to September 21, 2019.    IMPRESSION: Unchanged chest showing subcutaneous defibrillator. Patient is a 29y old  Male who presents with a chief complaint of SICD firing (01 Mar 2020 13:15)      BRIEF HOSPITAL COURSE: 28 y/o M with a h/o ITP on prednisone, sudden cardiac death in 2013, has had AICD since that time, admitted on 2/28 for multiple shocks by SQ ICD. Noted to be in polymorphic VT/VF and has received multiple shocks in hospital. Moved to MICU on 2/29 and started on isoproterenol infusion for arrhythmia suppression. Has had periodic NSVT.      Events last 24 hours: Patient continues to have intermittent PACs, PVCs, narrow complex tachyarrhythmias. Isoproterenol infusing. Plan for VT ablation later today.       PAST MEDICAL & SURGICAL HISTORY:  History of ITP: 12/2019 s/p oral steroid tx with normalization of platelet count  Atrial fibrillation  Obstructive sleep apnea: CPAP  History of ventricular fibrillation: last ICD shock 8/2019  S/P ICD (internal cardiac defibrillator) procedure: SQ ICD s/p 4 revisions last 9/2019      Review of Systems:  CONSTITUTIONAL: No fever, chills, or fatigue  EYES: No eye pain, visual disturbances, or discharge  ENMT:  No difficulty hearing, tinnitus, vertigo; No sinus or throat pain  NECK: No pain or stiffness  RESPIRATORY: No cough, wheezing, chills or hemoptysis; No shortness of breath  CARDIOVASCULAR: No chest pain, palpitations, dizziness, or leg swelling  GASTROINTESTINAL: No abdominal or epigastric pain. No nausea, vomiting, or hematemesis; No diarrhea or constipation. No melena or hematochezia.  GENITOURINARY: No dysuria, frequency, hematuria, or incontinence  NEUROLOGICAL: No headaches, memory loss, loss of strength, numbness, or tremors  SKIN: No itching, burning, rashes, or lesions   MUSCULOSKELETAL: No joint pain or swelling; No muscle, back, or extremity pain  PSYCHIATRIC: No depression, anxiety, mood swings, or difficulty sleeping      Medications:    isoproterenol Infusion 2 MICROgram(s)/Min IV Continuous <Continuous>  predniSONE   Tablet 40 milliGRAM(s) Oral daily        ICU Vital Signs Last 24 Hrs  T(C): 36.8 (01 Mar 2020 23:30), Max: 37.2 (01 Mar 2020 12:00)  T(F): 98.2 (01 Mar 2020 23:30), Max: 99 (01 Mar 2020 12:00)  HR: 80 (01 Mar 2020 23:00) (65 - 98)  BP: 113/63 (01 Mar 2020 23:00) (81/41 - 152/65)  BP(mean): 80 (01 Mar 2020 23:00) (59 - 103)  ABP: --  ABP(mean): --  RR: 15 (01 Mar 2020 23:00) (14 - 21)  SpO2: 96% (01 Mar 2020 23:00) (94% - 100%)          I&O's Detail    29 Feb 2020 07:01  -  01 Mar 2020 07:00  --------------------------------------------------------  IN:    isoproterenol Infusion: 585 mL    Oral Fluid: 240 mL    Solution: 124 mL  Total IN: 949 mL    OUT:    Voided: 350 mL  Total OUT: 350 mL    Total NET: 599 mL      01 Mar 2020 07:01  -  02 Mar 2020 00:32  --------------------------------------------------------  IN:    isoproterenol Infusion: 510 mL  Total IN: 510 mL    OUT:  Total OUT: 0 mL    Total NET: 510 mL            LABS:                        13.6   7.90  )-----------( 142      ( 01 Mar 2020 03:12 )             41.8     03-01    137  |  103  |  17.0  ----------------------------<  147<H>  4.5   |  23.0  |  0.60    Ca    8.8      01 Mar 2020 03:12  Phos  2.2     03-01  Mg     2.3     03-01            CAPILLARY BLOOD GLUCOSE      POCT Blood Glucose.: 86 mg/dL (29 Feb 2020 09:39)        CULTURES:        Physical Examination:    General: No acute distress.  Alert, oriented, interactive, nonfocal    HEENT: Pupils equal, reactive to light.  Symmetric.    PULM: Clear to auscultation bilaterally, no significant sputum production    CVS: Regular rate and rhythm, frequent ectopy, no murmurs, rubs, or gallops    ABD: Soft, nondistended, nontender, normoactive bowel sounds, no masses    EXT: No edema, nontender    SKIN: Warm and well perfused, no rashes noted.    NEURO: A&Ox3, strength 5/5 all extremities, cranial nerves grossly intact, no focal deficits      RADIOLOGY:     < from: Xray Chest 2 Views PA/Lat (02.28.20 @ 10:59) >  Heart size is within normal limits.    Left subcutaneous defibrillator again noted.    Lung fields and pleural surfaces are unremarkable.    Chest is similar to September 21, 2019.    IMPRESSION: Unchanged chest showing subcutaneous defibrillator.

## 2020-03-02 NOTE — PROGRESS NOTE ADULT - SUBJECTIVE AND OBJECTIVE BOX
ELECTROPHYSIOLOGY BRIEF POST-OP NOTE    I have personally seen and examined the patient. I agree with the history and physical which I have reviewed and noted any changes below.     PRE-OP DIAGNOSIS: VT/VF    POST-OP DIAGNOSIS: same    PROCEDURE: EPS and empiric radiofrequency ablation of moderator band, ablation of lateral right atrial appendage.   NEVAEH findings of ECTOR thrombus    Physician: Aydin Barkley MD  Assistant: Qasim David MD    ESTIMATED BLOOD LOSS: <10 mL    ANESTHESIA TYPE:  [  ]General Anesthesia  [ x ]Sedation  [  ]Local/Regional    CONDITION:  [  ]Critical  [ x ]Serious  [  ]Stable  [  ]Good    FINDINGS: No inducible VT/VF. Induction of AFib during EPS/RFA of RAA    EKG: NSR at 80bpm with frequent APCs, QRSD 146ms; RBBB    Vital Signs Last 24 Hrs  T(C): 36.7 (02 Mar 2020 08:30), Max: 37.1 (01 Mar 2020 20:00)  T(F): 98 (02 Mar 2020 08:30), Max: 98.7 (01 Mar 2020 20:00)  HR: 79 (02 Mar 2020 16:15) (70 - 98)  BP: 128/76 (02 Mar 2020 16:15) (93/54 - 138/73)  BP(mean): 76 (02 Mar 2020 07:00) (68 - 91)  RR: 18 (02 Mar 2020 16:15) (12 - 20)  SpO2: 98% (02 Mar 2020 16:15) (94% - 99%)    Physical Exam:  Constitutional: NAD, AAOx3  Cardiovascular: +S1S2 RRR  Pulmonary: CTA b/l, unlabored  GI: soft NTND +BS  Extremities: no pedal edema,   Right and Left Groin: Figure 8 sutures with dressings CDI  Neuro: non focal, MARTINS x4    A/P  29 year old male with ITP (on steroids, followed by Dr. Lesvia Ye), MICHELLE on CPAP, paroxysmal AF, and idiopathic VF s/p S-ICD and recurrent VT/VF who is now s/p successful ablation of moderator band and RAA APCs.     -Bedrest x 4 hours  -start Lovenox weight based SC q12h. Transition to Eliquis 5mg PO BID for ECTOR thrombus seen on NEVAEH  -Start Toprol XL 100mg  -Quinidine 200mg PO q8h  -Device turned back on post procedure  -Continue ICU level of care  -Figure 8 sutures in right and left groin will be removed in AM by EP staff  -Will follow closely. ELECTROPHYSIOLOGY BRIEF POST-OP NOTE    I have personally seen and examined the patient. I agree with the history and physical which I have reviewed and noted any changes below.     PRE-OP DIAGNOSIS: VT/VF    POST-OP DIAGNOSIS: same    PROCEDURE: EPS and empiric radiofrequency ablation of moderator band, ablation of lateral right atrial appendage.   NEVAEH findings of ECTOR thrombus    Physician: Aydin Barkley MD  Assistant: Qasim David MD    ESTIMATED BLOOD LOSS: <10 mL    ANESTHESIA TYPE:  [  ]General Anesthesia  [ x ]Sedation  [  ]Local/Regional    CONDITION:  [  ]Critical  [ x ]Serious  [  ]Stable  [  ]Good    FINDINGS: No inducible VT/VF. Induction of AFib during EPS/RFA of RAA    EKG: NSR at 80bpm with frequent APCs, QRSD 146ms; RBBB    Vital Signs Last 24 Hrs  T(C): 36.7 (02 Mar 2020 08:30), Max: 37.1 (01 Mar 2020 20:00)  T(F): 98 (02 Mar 2020 08:30), Max: 98.7 (01 Mar 2020 20:00)  HR: 79 (02 Mar 2020 16:15) (70 - 98)  BP: 128/76 (02 Mar 2020 16:15) (93/54 - 138/73)  BP(mean): 76 (02 Mar 2020 07:00) (68 - 91)  RR: 18 (02 Mar 2020 16:15) (12 - 20)  SpO2: 98% (02 Mar 2020 16:15) (94% - 99%)    Physical Exam:  Constitutional: NAD, AAOx3  Cardiovascular: +S1S2 RRR  Pulmonary: CTA b/l, unlabored  GI: soft NTND +BS  Extremities: no pedal edema,   Right and Left Groin: Figure 8 sutures with dressings CDI  Neuro: non focal, MARTINS x4    A/P  29 year old male with ITP (on steroids, followed by Dr. Lesvia Ye), MICHELLE on CPAP, paroxysmal AF, and idiopathic VF s/p S-ICD and recurrent VT/VF who is now s/p successful ablation of moderator band and RAA APCs.     -Bedrest x 4 hours  -start Lovenox weight based SC q12h. Transition to Eliquis 5mg PO BID for ECTOR thrombus seen on NEVAEH prior to discharge  -Start Toprol XL 100mg  -Quinidine 200mg PO q8h  -Device turned back on post procedure  -Continue ICU level of care  -Figure 8 sutures in right and left groin will be removed in AM by EP staff  -Will follow closely.

## 2020-03-03 LAB
ANION GAP SERPL CALC-SCNC: 13 MMOL/L — SIGNIFICANT CHANGE UP (ref 5–17)
BUN SERPL-MCNC: 13 MG/DL — SIGNIFICANT CHANGE UP (ref 8–20)
CALCIUM SERPL-MCNC: 8.7 MG/DL — SIGNIFICANT CHANGE UP (ref 8.6–10.2)
CHLORIDE SERPL-SCNC: 100 MMOL/L — SIGNIFICANT CHANGE UP (ref 98–107)
CO2 SERPL-SCNC: 23 MMOL/L — SIGNIFICANT CHANGE UP (ref 22–29)
CREAT SERPL-MCNC: 0.56 MG/DL — SIGNIFICANT CHANGE UP (ref 0.5–1.3)
GLUCOSE SERPL-MCNC: 132 MG/DL — HIGH (ref 70–99)
HCT VFR BLD CALC: 41.9 % — SIGNIFICANT CHANGE UP (ref 39–50)
HGB BLD-MCNC: 13.8 G/DL — SIGNIFICANT CHANGE UP (ref 13–17)
MAGNESIUM SERPL-MCNC: 2.3 MG/DL — SIGNIFICANT CHANGE UP (ref 1.6–2.6)
MCHC RBC-ENTMCNC: 29.6 PG — SIGNIFICANT CHANGE UP (ref 27–34)
MCHC RBC-ENTMCNC: 32.9 GM/DL — SIGNIFICANT CHANGE UP (ref 32–36)
MCV RBC AUTO: 89.9 FL — SIGNIFICANT CHANGE UP (ref 80–100)
PHOSPHATE SERPL-MCNC: 3.7 MG/DL — SIGNIFICANT CHANGE UP (ref 2.4–4.7)
PLATELET # BLD AUTO: 150 K/UL — SIGNIFICANT CHANGE UP (ref 150–400)
POTASSIUM SERPL-MCNC: 4.2 MMOL/L — SIGNIFICANT CHANGE UP (ref 3.5–5.3)
POTASSIUM SERPL-SCNC: 4.2 MMOL/L — SIGNIFICANT CHANGE UP (ref 3.5–5.3)
RBC # BLD: 4.66 M/UL — SIGNIFICANT CHANGE UP (ref 4.2–5.8)
RBC # FLD: 14.2 % — SIGNIFICANT CHANGE UP (ref 10.3–14.5)
SODIUM SERPL-SCNC: 136 MMOL/L — SIGNIFICANT CHANGE UP (ref 135–145)
WBC # BLD: 11.9 K/UL — HIGH (ref 3.8–10.5)
WBC # FLD AUTO: 11.9 K/UL — HIGH (ref 3.8–10.5)

## 2020-03-03 PROCEDURE — 99223 1ST HOSP IP/OBS HIGH 75: CPT

## 2020-03-03 PROCEDURE — 93010 ELECTROCARDIOGRAM REPORT: CPT

## 2020-03-03 PROCEDURE — 99232 SBSQ HOSP IP/OBS MODERATE 35: CPT | Mod: 24

## 2020-03-03 RX ORDER — MUPIROCIN 20 MG/G
1 OINTMENT TOPICAL
Refills: 0 | Status: DISCONTINUED | OUTPATIENT
Start: 2020-03-03 | End: 2020-03-04

## 2020-03-03 RX ADMIN — ENOXAPARIN SODIUM 110 MILLIGRAM(S): 100 INJECTION SUBCUTANEOUS at 21:51

## 2020-03-03 RX ADMIN — ENOXAPARIN SODIUM 110 MILLIGRAM(S): 100 INJECTION SUBCUTANEOUS at 08:59

## 2020-03-03 RX ADMIN — Medication 40 MILLIGRAM(S): at 21:53

## 2020-03-03 RX ADMIN — Medication 200 MILLIGRAM(S): at 06:03

## 2020-03-03 RX ADMIN — Medication 200 MILLIGRAM(S): at 21:53

## 2020-03-03 RX ADMIN — Medication 200 MILLIGRAM(S): at 14:17

## 2020-03-03 RX ADMIN — MUPIROCIN 1 APPLICATION(S): 20 OINTMENT TOPICAL at 21:52

## 2020-03-03 NOTE — PROGRESS NOTE ADULT - ASSESSMENT
Dio Venegas is a 29 year old male with ITP (on steroids, followed by Dr. Lesvia Ye), MICHELLE on CPAP, paroxysmal AF, and idiopathic VF s/p S-ICD who presents after getting multiple ICD shocks at home.    The patient was in his usual state of health until Wednesday night. His wife notes that she woke up to him appearing stiff, shaking (almost as if seizing), eyes rolling back into his head and then arching his back. He then awoke and asked "what happened." He felt OK otherwise and went back to bed. He had another shock later that night. On Thursday the EP office received a transmission that he had received 2 shocks for VF. These were suggestive of long-short sequences but the "long" sequence was about 60 bpm. He also had one episode of non-sustained monomorphic VT. He was recommended to get STAT blood work and change to Nadolol but when he went to the pharmacy they did not have it. He took his usual dose of Metoprolol Succinate 200mg daily last night again. This early morning he again had an ICD shock for VF. The office received his report of VF and he was told to present to the ER. He only notes that his left lower back and inner thigh discomfort described as "burning."    As of 3/3/2020, pt presents without any complaints of chest pain/pressure, palpitations, dizziness, lightheadedness, dyspnea, orthopnea, PND, peripheral edema, vision/hearing changes, nausea, vomiting diarrhea, dysuria, hematuria. Ablation catheter site free of ecchymosis, bleeding, or hematoma. ECTOR thrombus found on pre op NEVEAH so pt started on Lovenox BID. Some ectopy noted on cardiac monitor today, no electrolyte imbalances noted. Pt also started on Quinidine. Pt evolemic on exam. Plan for downgrade to tele.            VT/VF  -Pt s/p successful moderator band ablation  As per EP recommendations:   -Cont. Lovenox weight based SC q12h. Transition to Eliquis 5mg PO BID for ECTOR thrombus seen on NEVAEH prior to discharge  -Cont.  Toprol XL 100mg  -Cont. Quinidine 200mg PO q8h  -Monitor on telemetry

## 2020-03-03 NOTE — PROGRESS NOTE ADULT - ATTENDING COMMENTS
Aydin Barkley MD  Clinical Cardiac Electrophysiology
I have personally seen, examined, and participated in the care of this patient. I have reviewed all pertinent clinical information, including history, physical exam, plan, and the PA/NP's note and agree except as noted below.    Status post ablation for idiopathic VF with empiric ablation of moderator band. Ablation in lateral RAA for focal PAC with partial suppression. Started low dose Quinidine and will closely monitor for recurrent episodes of VF. If patient has recurrent VF, may need to consider replacing S-ICD with a transvenous dual chamber device to allow for higher rate rates to suppress future VF events.    Aydin Barkley MD  Clinical Cardiac Electrophysiology
Pt I seen, examined, chart reviewed, d/w np/pa.  Kamaljit fib/vent tachycardia preceded by  PVC  For ablation in am  NPO after 12 am  Per Dr Barkley Ep  If v fib reoccurs float a temporary wire and pace at 90  will not increase isuprel at this time to avoid atrial fib  Discussed with PA in ICU
Aydin Barkley MD  Clinical Cardiac Electrophysiology

## 2020-03-03 NOTE — PROGRESS NOTE ADULT - ASSESSMENT
30 y/o male with history of SICD placement presenting with 3 episodes of firing of his SICD night prior to admission.  Pt found to be in polymorphic VT/VF and has received multiple shocks.  He was initially admitted to telemetry with plan for ablation on Monday but pt continues to have long runs VT and has been receiving shocks upgraded to MICU Saturday 2/29/20 and placed on Isoprel gtt as per Dr Barkley/EP cardio. Pt did well over weekend on Isuprel and had a Vtach ablation yesterday.  Today pt is stable without arrythmia and will be downgraded to telemetry unit to medical service.      1) Ventricular tachycardia s/p ablation  - Dr Barkley/EP following with following recs:  Cont. Lovenox weight based SC q12h. Transition to Eliquis 5mg PO BID for ECTOR thrombus seen on NEVAEH prior to discharge  Cont.  Toprol XL 100mg  Cont. Quinidine 200mg PO q8h  Monitor on telemetry    2) ITP/Thrombocytopenia  - oupt follow up with hematology  - Plt are 150K today  - on prednisone taper     3) Prophylactic measure  - DVT ppx: already on lovenox 28 y/o male with history of SICD placement presenting with 3 episodes of firing of his SICD night prior to admission.  Pt found to be in polymorphic VT/VF and has received multiple shocks.  He was initially admitted to telemetry with plan for ablation on Monday but pt continues to have long runs VT and has been receiving shocks upgraded to MICU Saturday 2/29/20 and placed on Isoprel gtt as per Dr Barkley/EP cardio. Pt did well over weekend on Isuprel and had a Vtach ablation yesterday.  Today pt is stable without arrythmia and will be downgraded to telemetry unit to medical service.      1) Ventricular tachycardia s/p ablation  - Dr Barkley/EP following with following recs:  Cont. Lovenox weight based SC q12h. Transition to Eliquis 5mg PO BID for ECTOR thrombus seen on NEVAEH prior to discharge  Cont.  Toprol XL 100mg  Cont. Quinidine 200mg PO q8h  Monitor on telemetry    2)  ECTOR thrombus  - Lovenox weight based SC q12h. Transition to Eliquis 5mg PO BID prior to discharge    3) ITP/Thrombocytopenia  - oupt follow up with hematology  - Plt are 150K today  - on prednisone taper     4) Prophylactic measure  - DVT ppx: already on lovenox

## 2020-03-03 NOTE — PROGRESS NOTE ADULT - SUBJECTIVE AND OBJECTIVE BOX
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  Pt admitted to hospital due to multiple shocks by Subcutaneous ICD.  Pt found to be in polymorphic VT/VF and has received multiple shocks.  He was initially admitted to telemetry with plan for ablation on Monday but pt continues to have long runs VT and has been receiving shocks this  morning. Pt was urgently moved to ICU on Saturday and placed on Isoprel gtt as per Dr Barkley/EP cardio.   Pt did well over weekend on Isuprel and had a Vtach ablation yesterday.  Today pt is stable without arrythmias and will be downgraded to telemetry unit today.  Patient is a 29y old  Male who presents with a chief complaint of SICD firing (03 Mar 2020 08:22)      BRIEF HOSPITAL COURSE: as above    Events last 24 hours: doing well s/p ablation    PAST MEDICAL & SURGICAL HISTORY:  History of ITP: 12/2019 s/p oral steroid tx with normalization of platelet count  Atrial fibrillation  Obstructive sleep apnea: CPAP  History of ventricular fibrillation: last ICD shock 8/2019  S/P ICD (internal cardiac defibrillator) procedure: SQ ICD s/p 4 revisions last 9/2019      Review of Systems:  CONSTITUTIONAL: No fever, chills, or fatigue  EYES: No eye pain, visual disturbances, or discharge  ENMT:  No difficulty hearing, tinnitus, vertigo; No sinus or throat pain  NECK: No pain or stiffness  RESPIRATORY: No cough, wheezing, chills or hemoptysis; No shortness of breath  CARDIOVASCULAR: No chest pain, palpitations, dizziness, or leg swelling  GASTROINTESTINAL: No abdominal or epigastric pain. No nausea, vomiting, or hematemesis; No diarrhea or constipation. No melena or hematochezia.  GENITOURINARY: No dysuria, frequency, hematuria, or incontinence  NEUROLOGICAL: No headaches, memory loss, loss of strength, numbness, or tremors  SKIN: No itching, burning, rashes, or lesions   MUSCULOSKELETAL: No joint pain or swelling; No muscle, back, or extremity pain  PSYCHIATRIC: No depression, anxiety, mood swings, or difficulty sleeping      Medications:    metoprolol succinate  milliGRAM(s) Oral daily  quiNIDine sulfate 200 milliGRAM(s) Oral every 8 hours      acetaminophen   Tablet .. 650 milliGRAM(s) Oral every 6 hours PRN  oxycodone    5 mG/acetaminophen 325 mG 1 Tablet(s) Oral every 6 hours PRN      enoxaparin Injectable 110 milliGRAM(s) SubCutaneous <User Schedule>    aluminum hydroxide/magnesium hydroxide/simethicone Suspension 30 milliLiter(s) Oral every 6 hours PRN      predniSONE   Tablet 40 milliGRAM(s) Oral daily        benzocaine 15 mG/menthol 3.6 mG (Sugar-Free) Lozenge 1 Lozenge Oral every 6 hours PRN  mupirocin 2% Ointment 1 Application(s) Topical two times a day            ICU Vital Signs Last 24 Hrs  T(C): 36.9 (03 Mar 2020 08:00), Max: 36.9 (02 Mar 2020 18:05)  T(F): 98.5 (03 Mar 2020 08:00), Max: 98.5 (03 Mar 2020 08:00)  HR: 67 (03 Mar 2020 08:00) (60 - 105)  BP: 133/86 (02 Mar 2020 18:05) (112/70 - 133/86)  BP(mean): 99 (02 Mar 2020 18:05) (99 - 99)  ABP: 122/65 (03 Mar 2020 08:00) (111/50 - 159/142)  ABP(mean): 84 (03 Mar 2020 08:00) (66 - 143)  RR: 18 (03 Mar 2020 08:00) (8 - 37)  SpO2: 99% (03 Mar 2020 08:00) (96% - 99%)                LABS:                        13.8   11.90 )-----------( 150      ( 03 Mar 2020 03:58 )             41.9     03-03    136  |  100  |  13.0  ----------------------------<  132<H>  4.2   |  23.0  |  0.56    Ca    8.7      03 Mar 2020 03:58  Phos  3.7     03-03  Mg     2.3     03-03            CAPILLARY BLOOD GLUCOSE        PT/INR - ( 02 Mar 2020 05:21 )   PT: 10.7 sec;   INR: 0.95 ratio         PTT - ( 02 Mar 2020 05:21 )  PTT:27.0 sec    CULTURES:      Physical Examination:    General: No acute distress.  Alert, oriented, interactive, nonfocal    HEENT: Pupils equal, reactive to light.  Symmetric.    PULM: Clear to auscultation bilaterally, no significant sputum production    CVS: Regular rate and rhythm, no murmurs, rubs, or gallops    ABD: Soft, nondistended, nontender, normoactive bowel sounds, no masses    EXT: No edema, b/l groins puncture sites c/d/i no  hematoma    SKIN: Warm and well perfused, no rashes noted.    RADIOLOGY: image reviewed    CRITICAL CARE TIME SPENT: ***

## 2020-03-03 NOTE — PROGRESS NOTE ADULT - PROBLEM SELECTOR PLAN 1
Pt s/p ablation  groin sites c/d/i  D/c Glenny today  downgrade to tele as per d/w Dr Barkley/EP  On quinidine

## 2020-03-03 NOTE — PROGRESS NOTE ADULT - SUBJECTIVE AND OBJECTIVE BOX
JESS CRAMER    [id number]    29y      Male    CC: arrythmia    INTERVAL HPI/OVERNIGHT EVENTS:  28 y/o male with history of SICD placement presenting with 3 episodes of firing of his SICD night prior to admission.  Pt found to be in polymorphic VT/VF and has received multiple shocks.  He was initially admitted to telemetry with plan for ablation on Monday but pt continues to have long runs VT and has been receiving shocks upgraded to MICU Saturday 2/29/20 and placed on Isoprel gtt as per Dr Barkley/EP cardio. Pt did well over weekend on Isuprel and had a Vtach ablation yesterday.  Today pt is stable without arrythmia and will be downgraded to telemetry unit to medical service.    Pt denies any pain, no sob, no n/v or abd pain.    REVIEW OF SYSTEMS:  CONSTITUTIONAL: No fever, weight loss, or fatigue  RESPIRATORY: No cough, wheezing, hemoptysis; No shortness of breath  CARDIOVASCULAR: No chest pain, palpitations  GASTROINTESTINAL: No abdominal or epigastric pain. No nausea, vomiting      Vital Signs Last 24 Hrs  T(C): 36.9 (03 Mar 2020 12:00), Max: 36.9 (02 Mar 2020 18:05)  T(F): 98.5 (03 Mar 2020 12:00), Max: 98.5 (03 Mar 2020 08:00)  HR: 83 (03 Mar 2020 11:33) (60 - 105)  BP: 107/56 (03 Mar 2020 11:00) (104/59 - 133/86)  BP(mean): 74 (03 Mar 2020 11:00) (74 - 99)  RR: 16 (03 Mar 2020 11:00) (8 - 37)  SpO2: 97% (03 Mar 2020 11:33) (96% - 99%)    PHYSICAL EXAM:    GENERAL: NAD, well-groomed  HEENT: PERRL, +EOMI  NECK: soft, Supple, No JVD,   CHEST/LUNG: Clear to auscultation bilaterally; No wheezing  HEART: S1S2+, Regular rate and rhythm; No murmurs  ABDOMEN: Soft, Nontender, Nondistended; Bowel sounds present  EXTREMITIES:  2+ Peripheral Pulses, No clubbing, cyanosis, or edema    LABS:                        13.8   11.90 )-----------( 150      ( 03 Mar 2020 03:58 )             41.9     03-03    136  |  100  |  13.0  ----------------------------<  132<H>  4.2   |  23.0  |  0.56    Ca    8.7      03 Mar 2020 03:58  Phos  3.7     03-03  Mg     2.3     03-03      PT/INR - ( 02 Mar 2020 05:21 )   PT: 10.7 sec;   INR: 0.95 ratio         PTT - ( 02 Mar 2020 05:21 )  PTT:27.0 sec        MEDICATIONS  (STANDING):  enoxaparin Injectable 110 milliGRAM(s) SubCutaneous <User Schedule>  metoprolol succinate  milliGRAM(s) Oral <User Schedule>  mupirocin 2% Ointment 1 Application(s) Topical two times a day  predniSONE   Tablet 40 milliGRAM(s) Oral <User Schedule>  quiNIDine sulfate 200 milliGRAM(s) Oral every 8 hours    MEDICATIONS  (PRN):  acetaminophen   Tablet .. 650 milliGRAM(s) Oral every 6 hours PRN Mild Pain (1 - 3)  aluminum hydroxide/magnesium hydroxide/simethicone Suspension 30 milliLiter(s) Oral every 6 hours PRN Dyspepsia  benzocaine 15 mG/menthol 3.6 mG (Sugar-Free) Lozenge 1 Lozenge Oral every 6 hours PRN Sore Throat  oxycodone    5 mG/acetaminophen 325 mG 1 Tablet(s) Oral every 6 hours PRN Moderate Pain (4 - 6)

## 2020-03-03 NOTE — PROGRESS NOTE ADULT - SUBJECTIVE AND OBJECTIVE BOX
Patient seen today in bed. Figure 8 sutures removed from right and left groin without complication. No overnight complaints. Resting comfortably.     EKG: not performed  TELE: SR with very frequent APCs. No NSVT or PVCs observed.     MEDICATIONS  (STANDING):  enoxaparin Injectable 110 milliGRAM(s) SubCutaneous <User Schedule>  metoprolol succinate  milliGRAM(s) Oral daily  mupirocin 2% Ointment 1 Application(s) Topical two times a day  predniSONE   Tablet 40 milliGRAM(s) Oral daily  quiNIDine sulfate 200 milliGRAM(s) Oral every 8 hours    MEDICATIONS  (PRN):  acetaminophen   Tablet .. 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 s/p oral steroid tx with normalization of platelet count  Atrial fibrillation  Obstructive sleep apnea: CPAP  History of ventricular fibrillation: last ICD shock 8/2019  S/P ICD (internal cardiac defibrillator) procedure: SQ ICD s/p 4 revisions last 9/2019    Vital Signs Last 24 Hrs  T(C): 36.9 (03 Mar 2020 08:00), Max: 36.9 (02 Mar 2020 18:05)  T(F): 98.5 (03 Mar 2020 08:00), Max: 98.5 (03 Mar 2020 08:00)  HR: 60 (03 Mar 2020 07:00) (60 - 105)  BP: 133/86 (02 Mar 2020 18:05) (112/70 - 138/73)  BP(mean): 99 (02 Mar 2020 18:05) (99 - 99)  RR: 18 (03 Mar 2020 07:00) (8 - 37)  SpO2: 98% (03 Mar 2020 07:00) (96% - 99%)    Physical Exam:  Constitutional: NAD, AAOx3  Cardiovascular: +S1S2 RRR  Pulmonary: CTA b/l, unlabored  GI: soft NTND +BS  Extremities: no pedal edema,   Right and left Groin: No hematoma.   Neuro: non focal, MARTINS x4    LABS:                        13.8   11.90 )-----------( 150      ( 03 Mar 2020 03:58 )             41.9     136  |  100  |  13.0  ----------------------------<  132<H>  4.2   |  23.0  |  0.56  Ca    8.7      03 Mar 2020 03:58  Phos  3.7     03-03  Mg     2.3     03-03  PT/INR - ( 02 Mar 2020 05:21 )   PT: 10.7 sec;   INR: 0.95 ratio    PTT - ( 02 Mar 2020 05:21 )  PTT:27.0 sec    A/P  29 year old male with ITP (on steroids, followed by Dr. Lesvia Ye), MICHELLE on CPAP, paroxysmal AF, and idiopathic VF s/p S-ICD and recurrent VT/VF who is now s/p successful ablation of moderator band and RAA APCs.     - Continue Quinidine. Monitor CBCs  - EKG not performed  - Continue Lovenox weight based SC q12h. Transition to Eliquis 5mg PO BID for ECTOR thrombus seen on NEVAEH prior to discharge. Likely tomorrow  - Downgrade to telemetry ok from EP perspective.   - Will actively follow. Jay CARDIAC ELECTROPHYSIOLOGY                                                       [facility]                                                      Office: [address] [address]                                                       Telephone: [phone]. Fax:[phone]    Patient seen today in bed. Figure 8 sutures removed from right and left groin without complication. No overnight complaints. Resting comfortably.     EKG: NSR with RBBB (yesterday's ECG)  TELE: Spontaneous conversion to normal rhythm from AF around 11 PM. SR with very frequent APCs. No NSVT or PVCs observed.     MEDICATIONS  (STANDING):  enoxaparin Injectable 110 milliGRAM(s) SubCutaneous <User Schedule>  metoprolol succinate  milliGRAM(s) Oral daily  mupirocin 2% Ointment 1 Application(s) Topical two times a day  predniSONE   Tablet 40 milliGRAM(s) Oral daily  quiNIDine sulfate 200 milliGRAM(s) Oral every 8 hours    MEDICATIONS  (PRN):  acetaminophen   Tablet .. 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 s/p oral steroid tx with normalization of platelet count  Atrial fibrillation  Obstructive sleep apnea: CPAP  History of ventricular fibrillation: last ICD shock 8/2019  S/P ICD (internal cardiac defibrillator) procedure: SQ ICD s/p 4 revisions last 9/2019    Vital Signs Last 24 Hrs  T(C): 36.9 (03 Mar 2020 08:00), Max: 36.9 (02 Mar 2020 18:05)  T(F): 98.5 (03 Mar 2020 08:00), Max: 98.5 (03 Mar 2020 08:00)  HR: 60 (03 Mar 2020 07:00) (60 - 105)  BP: 133/86 (02 Mar 2020 18:05) (112/70 - 138/73)  BP(mean): 99 (02 Mar 2020 18:05) (99 - 99)  RR: 18 (03 Mar 2020 07:00) (8 - 37)  SpO2: 98% (03 Mar 2020 07:00) (96% - 99%)    Physical Exam:  Constitutional: NAD, AAOx3  Cardiovascular: +S1S2 RRR  Pulmonary: CTA b/l, unlabored  GI: soft NTND +BS  Extremities: no pedal edema,   Right and left Groin: No hematoma.   Neuro: non focal, MARTINS x4    LABS:                        13.8   11.90 )-----------( 150      ( 03 Mar 2020 03:58 )             41.9     136  |  100  |  13.0  ----------------------------<  132<H>  4.2   |  23.0  |  0.56  Ca    8.7      03 Mar 2020 03:58  Phos  3.7     03-03  Mg     2.3     03-03  PT/INR - ( 02 Mar 2020 05:21 )   PT: 10.7 sec;   INR: 0.95 ratio    PTT - ( 02 Mar 2020 05:21 )  PTT:27.0 sec    A/P  29 year old male with ITP (on steroids, followed by Dr. Lesvia Ye), MICHELLE on CPAP, paroxysmal AF, and idiopathic VF s/p S-ICD and recurrent VT/VF who is now s/p successful ablation of moderator band and RAA APCs.     - Continue Quinidine. Monitor CBCs as it can cause thromboctyopenia  - Continue Lovenox weight based SC q12h. Transition to Eliquis 5mg PO BID for ECTOR thrombus seen on NEVAEH prior to discharge, Likely tomorrow  - Downgrade to telemetry ok from EP perspective.  - Continue Metoprolol Succinate 100mg daily  - Continue Prednisone 40mg daily for ITP  - Will actively follow.

## 2020-03-03 NOTE — PROGRESS NOTE ADULT - SUBJECTIVE AND OBJECTIVE BOX
[place] CARDIOLOGY-[facility]                                                               Office: [address] [address]                                                              Telephone: [phone]. Fax:[phone]                                                                             PROGRESS NOTE  Reason for follow up:   Overnight: No new events.   Update:     Subjective: "  ______________________"      	  Vitals:  T(C): 36.9 (03-03-20 @ 12:00), Max: 36.9 (03-02-20 @ 18:05)  HR: 83 (03-03-20 @ 11:33) (60 - 105)  BP: 107/56 (03-03-20 @ 11:00) (104/59 - 133/86)  RR: 16 (03-03-20 @ 11:00) (8 - 37)  SpO2: 97% (03-03-20 @ 11:33) (96% - 99%)  Wt(kg): --  I&O's Summary    02 Mar 2020 07:01  -  03 Mar 2020 07:00  --------------------------------------------------------  IN: 0 mL / OUT: 900 mL / NET: -900 mL    03 Mar 2020 07:01  -  03 Mar 2020 12:26  --------------------------------------------------------  IN: 360 mL / OUT: 500 mL / NET: -140 mL      Weight (kg): 104.3 (03-02 @ 08:30)      PHYSICAL EXAM:  Appearance: Comfortable. No acute distress  HEENT:  Head and neck: Atraumatic. Normocephalic.  Normal oral mucosa, PERRL, Neck is supple. No JVD, No carotid bruit.   Neurologic: A & O x 3, no focal deficits. EOMI.  Lymphatic: No cervical lymphadenopathy  Cardiovascular: Normal S1 S2, No murmur, rubs/gallops. No JVD, No edema  Respiratory: Lungs clear to auscultation  Gastrointestinal:  Soft, Non-tender, + BS  Lower Extremities: No edema  Psychiatry: Patient is calm. No agitation. Mood & affect appropriate  Skin: No rashes/ ecchymoses/cyanosis/ulcers visualized on the face, hands or feet.      CURRENT MEDICATIONS:  metoprolol succinate  milliGRAM(s) Oral <User Schedule>  quiNIDine sulfate 200 milliGRAM(s) Oral every 8 hours    predniSONE   Tablet  enoxaparin Injectable  mupirocin 2% Ointment      DIAGNOSTIC TESTING:  [ ] Echocardiogram:   [ ]  Catheterization:  [ ] Stress Test:    OTHER: 	      LABS:	 	                            13.8   11.90 )-----------( 150      ( 03 Mar 2020 03:58 )             41.9     03-03    136  |  100  |  13.0  ----------------------------<  132<H>  4.2   |  23.0  |  0.56    Ca    8.7      03 Mar 2020 03:58  Phos  3.7     03-03  Mg     2.3     03-03      proBNP:   Lipid Profile:   HgA1c:   TSH:       TELEMETRY: Reviewed    ECG:  Reviewed by me. [place] CARDIOLOGY-[facility]                                                               Office: [address] [address]                                                              Telephone: [phone]. Fax:[phone]                                                                             PROGRESS NOTE  Reason for follow up: VF/VT  Overnight: No new events.   Update: As of 3/3/2020, pt presents without any complaints of chest pain/pressure, palpitations, dizziness, lightheadedness, dyspnea, orthopnea, PND, peripheral edema, vision/hearing changes, nausea, vomiting diarrhea, dysuria, hematuria. Ablation catheter site free of ecchymosis, bleeding, or hematoma. ECTOR thrombus found on pre op NEVAEH so pt started on Lovenox BID. Some ectopy noted on cardiac monitor today, no electrolyte imbalances noted. Pt also started on Quinidine. Pt evolemic on exam. Plan for downgrade to tele.     Subjective: " I have no chest pain, palpitations, or SOB	"    Dio Venegas is a 29 year old male with ITP (on steroids, followed by Dr. Lesvia Ye), MICHELLE on CPAP, paroxysmal AF, and idiopathic VF s/p S-ICD who presents after getting multiple ICD shocks at home.    The patient was in his usual state of health until Wednesday night. His wife notes that she woke up to him appearing stiff, shaking (almost as if seizing), eyes rolling back into his head and then arching his back. He then awoke and asked "what happened." He felt OK otherwise and went back to bed. He had another shock later that night. On Thursday the EP office received a transmission that he had received 2 shocks for VF. These were suggestive of long-short sequences but the "long" sequence was about 60 bpm. He also had one episode of non-sustained monomorphic VT. He was recommended to get STAT blood work and change to Nadolol but when he went to the pharmacy they did not have it. He took his usual dose of Metoprolol Succinate 200mg daily last night again. This early morning he again had an ICD shock for VF. The office received his report of VF and he was told to present to the ER. He only notes that his left lower back and inner thigh discomfort described as "burning."    As of 3/3/2020, pt presents without any complaints of chest pain/pressure, palpitations, dizziness, lightheadedness, dyspnea, orthopnea, PND, peripheral edema, vision/hearing changes, nausea, vomiting diarrhea, dysuria, hematuria. Ablation catheter site free of ecchymosis, bleeding, or hematoma. ECTOR thrombus found on pre op NEVAEH so pt started on Lovenox BID. Some ectopy noted on cardiac monitor today, no electrolyte imbalances noted. Pt also started on Quinidine. Pt evolemic on exam. Plan for downgrade to [facility].            Vitals:  T(C): 36.9 (03-03-20 @ 12:00), Max: 36.9 (03-02-20 @ 18:05)  HR: 83 (03-03-20 @ 11:33) (60 - 105)  BP: 107/56 (03-03-20 @ 11:00) (104/59 - 133/86)  RR: 16 (03-03-20 @ 11:00) (8 - 37)  SpO2: 97% (03-03-20 @ 11:33) (96% - 99%)    I&O's Summary    02 Mar 2020 07:01  -  03 Mar 2020 07:00  --------------------------------------------------------  IN: 0 mL / OUT: 900 mL / NET: -900 mL    03 Mar 2020 07:01  -  03 Mar 2020 12:26  --------------------------------------------------------  IN: 360 mL / OUT: 500 mL / NET: -140 mL      Weight (kg): 104.3 (03-02 @ 08:30)      PHYSICAL EXAM:  Appearance: Comfortable. No acute distress  HEENT:  Head and neck: Atraumatic. Normocephalic.  Normal oral mucosa, PERRL, Neck is supple. No JVD, No carotid bruit.   Neurologic: A & O x 3, no focal deficits. EOMI.  Lymphatic: No cervical lymphadenopathy  Cardiovascular: Normal S1 S2, No murmur, rubs/gallops. No JVD, No edema  Respiratory: Lungs clear to auscultation  Gastrointestinal:  Soft, Non-tender, + BS  Lower Extremities: No edema  Psychiatry: Patient is calm. No agitation. Mood & affect appropriate  Skin: No rashes/ ecchymoses/cyanosis/ulcers visualized on the face, hands or feet.      CURRENT MEDICATIONS:  metoprolol succinate  milliGRAM(s) Oral <User Schedule>  quiNIDine sulfate 200 milliGRAM(s) Oral every 8 hours  predniSONE   Tablet  enoxaparin Injectable  mupirocin 2% Ointment      DIAGNOSTIC TESTING:  Stress Test: 3/20/2017: No ischemia. No symptoms. Narrow complex and wide complex tachycardia in recovery suggestive of SVT and NSVT.    Cardiac MRI: 12/2013: Normal biventricular function. No LGE.    Genetic Testing 2019: VUS noted in ANK2 and SCN5A genes    NEVAEH 3/2/2020: ECTOR Thrombus    OTHER: 	  XRAY: < from: Xray Chest 2 Views PA/Lat (02.28.20 @ 10:59) >  IMPRESSION: Unchanged chest showing subcutaneous defibrillator.    < end of copied text >      LABS:	 	                            13.8   11.90 )-----------( 150      ( 03 Mar 2020 03:58 )             41.9     03-03    136  |  100  |  13.0  ----------------------------<  132<H>  4.2   |  23.0  |  0.56    Ca    8.7      03 Mar 2020 03:58  Phos  3.7     03-03  Mg     2.3     03-03      TELEMETRY: NSR HR @ 60-70s

## 2020-03-04 ENCOUNTER — TRANSCRIPTION ENCOUNTER (OUTPATIENT)
Age: 30
End: 2020-03-04

## 2020-03-04 VITALS
SYSTOLIC BLOOD PRESSURE: 121 MMHG | RESPIRATION RATE: 18 BRPM | OXYGEN SATURATION: 100 % | TEMPERATURE: 98 F | DIASTOLIC BLOOD PRESSURE: 77 MMHG | HEART RATE: 92 BPM

## 2020-03-04 LAB
ANION GAP SERPL CALC-SCNC: 12 MMOL/L — SIGNIFICANT CHANGE UP (ref 5–17)
BUN SERPL-MCNC: 20 MG/DL — SIGNIFICANT CHANGE UP (ref 8–20)
CALCIUM SERPL-MCNC: 8.8 MG/DL — SIGNIFICANT CHANGE UP (ref 8.6–10.2)
CHLORIDE SERPL-SCNC: 102 MMOL/L — SIGNIFICANT CHANGE UP (ref 98–107)
CO2 SERPL-SCNC: 25 MMOL/L — SIGNIFICANT CHANGE UP (ref 22–29)
CREAT SERPL-MCNC: 0.77 MG/DL — SIGNIFICANT CHANGE UP (ref 0.5–1.3)
GLUCOSE SERPL-MCNC: 134 MG/DL — HIGH (ref 70–99)
HCT VFR BLD CALC: 41.1 % — SIGNIFICANT CHANGE UP (ref 39–50)
HGB BLD-MCNC: 13.2 G/DL — SIGNIFICANT CHANGE UP (ref 13–17)
MAGNESIUM SERPL-MCNC: 2.2 MG/DL — SIGNIFICANT CHANGE UP (ref 1.6–2.6)
MCHC RBC-ENTMCNC: 29.2 PG — SIGNIFICANT CHANGE UP (ref 27–34)
MCHC RBC-ENTMCNC: 32.1 GM/DL — SIGNIFICANT CHANGE UP (ref 32–36)
MCV RBC AUTO: 90.9 FL — SIGNIFICANT CHANGE UP (ref 80–100)
PLATELET # BLD AUTO: 126 K/UL — LOW (ref 150–400)
POTASSIUM SERPL-MCNC: 5 MMOL/L — SIGNIFICANT CHANGE UP (ref 3.5–5.3)
POTASSIUM SERPL-SCNC: 5 MMOL/L — SIGNIFICANT CHANGE UP (ref 3.5–5.3)
RBC # BLD: 4.52 M/UL — SIGNIFICANT CHANGE UP (ref 4.2–5.8)
RBC # FLD: 14 % — SIGNIFICANT CHANGE UP (ref 10.3–14.5)
SODIUM SERPL-SCNC: 139 MMOL/L — SIGNIFICANT CHANGE UP (ref 135–145)
WBC # BLD: 8.53 K/UL — SIGNIFICANT CHANGE UP (ref 3.8–10.5)
WBC # FLD AUTO: 8.53 K/UL — SIGNIFICANT CHANGE UP (ref 3.8–10.5)

## 2020-03-04 PROCEDURE — 93320 DOPPLER ECHO COMPLETE: CPT

## 2020-03-04 PROCEDURE — C1732: CPT

## 2020-03-04 PROCEDURE — 93312 ECHO TRANSESOPHAGEAL: CPT

## 2020-03-04 PROCEDURE — 36415 COLL VENOUS BLD VENIPUNCTURE: CPT

## 2020-03-04 PROCEDURE — 84100 ASSAY OF PHOSPHORUS: CPT

## 2020-03-04 PROCEDURE — 85027 COMPLETE CBC AUTOMATED: CPT

## 2020-03-04 PROCEDURE — 84484 ASSAY OF TROPONIN QUANT: CPT

## 2020-03-04 PROCEDURE — 99285 EMERGENCY DEPT VISIT HI MDM: CPT | Mod: 25

## 2020-03-04 PROCEDURE — C1894: CPT

## 2020-03-04 PROCEDURE — 85610 PROTHROMBIN TIME: CPT

## 2020-03-04 PROCEDURE — 83735 ASSAY OF MAGNESIUM: CPT

## 2020-03-04 PROCEDURE — 82962 GLUCOSE BLOOD TEST: CPT

## 2020-03-04 PROCEDURE — 99239 HOSP IP/OBS DSCHRG MGMT >30: CPT

## 2020-03-04 PROCEDURE — 80053 COMPREHEN METABOLIC PANEL: CPT

## 2020-03-04 PROCEDURE — C1730: CPT

## 2020-03-04 PROCEDURE — 93662 INTRACARDIAC ECG (ICE): CPT

## 2020-03-04 PROCEDURE — 93654 COMPRE EP EVAL TX VT: CPT

## 2020-03-04 PROCEDURE — C1759: CPT

## 2020-03-04 PROCEDURE — 93655 ICAR CATH ABLTJ DSCRT ARRHYT: CPT

## 2020-03-04 PROCEDURE — 93005 ELECTROCARDIOGRAM TRACING: CPT

## 2020-03-04 PROCEDURE — C1766: CPT

## 2020-03-04 PROCEDURE — 80048 BASIC METABOLIC PNL TOTAL CA: CPT

## 2020-03-04 PROCEDURE — 71046 X-RAY EXAM CHEST 2 VIEWS: CPT

## 2020-03-04 PROCEDURE — 93325 DOPPLER ECHO COLOR FLOW MAPG: CPT

## 2020-03-04 PROCEDURE — 85730 THROMBOPLASTIN TIME PARTIAL: CPT

## 2020-03-04 RX ORDER — QUINIDINE SULFATE 200 MG
1 TABLET ORAL
Qty: 3 | Refills: 0
Start: 2020-03-04 | End: 2020-03-04

## 2020-03-04 RX ORDER — METOPROLOL TARTRATE 50 MG
1 TABLET ORAL
Qty: 30 | Refills: 0
Start: 2020-03-04 | End: 2020-04-02

## 2020-03-04 RX ORDER — QUINIDINE SULFATE 200 MG
1 TABLET ORAL
Qty: 90 | Refills: 0
Start: 2020-03-04 | End: 2020-04-02

## 2020-03-04 RX ORDER — APIXABAN 2.5 MG/1
1 TABLET, FILM COATED ORAL
Qty: 60 | Refills: 0
Start: 2020-03-04 | End: 2020-04-02

## 2020-03-04 RX ORDER — METOPROLOL TARTRATE 50 MG
1 TABLET ORAL
Qty: 0 | Refills: 0 | DISCHARGE
Start: 2020-03-04 | End: 2020-04-02

## 2020-03-04 RX ORDER — METOPROLOL TARTRATE 50 MG
1 TABLET ORAL
Qty: 0 | Refills: 0 | DISCHARGE

## 2020-03-04 RX ADMIN — MUPIROCIN 1 APPLICATION(S): 20 OINTMENT TOPICAL at 06:30

## 2020-03-04 RX ADMIN — Medication 200 MILLIGRAM(S): at 06:30

## 2020-03-04 RX ADMIN — ENOXAPARIN SODIUM 110 MILLIGRAM(S): 100 INJECTION SUBCUTANEOUS at 08:57

## 2020-03-04 NOTE — DISCHARGE NOTE PROVIDER - NSDCMRMEDTOKEN_GEN_ALL_CORE_FT
Eliquis 5 mg oral tablet: 1 tab(s) orally 2 times a day   metoprolol succinate 100 mg oral tablet, extended release: 1 tab(s) orally once a day   predniSONE 50 mg oral tablet: 1 tab(s) orally once a day on tapering dose for thrombocytopenia by his hematologist.  quiNIDine 200 mg oral tablet: 1 tab(s) orally every 8 hours

## 2020-03-04 NOTE — DISCHARGE NOTE PROVIDER - CARE PROVIDER_API CALL
Danish Garg)  Hematology; Medical Oncology  [address] [place] [address]  Phone: [phone]  Fax: [phone]  Follow Up Time: 1-3 days    Aydin Barkley)  Cardiology; Internal Medicine  [address] [facility], [address]  [place] [address]  Phone: [phone]  Fax: [phone]  Follow Up Time: 2 weeks

## 2020-03-04 NOTE — DISCHARGE NOTE PROVIDER - NSDCCPCAREPLAN_GEN_ALL_CORE_FT
PRINCIPAL DISCHARGE DIAGNOSIS  Diagnosis: S/P ICD (internal cardiac defibrillator) procedure  Assessment and Plan of Treatment: SQ ICD s/p 4 revisions last 9/2019. Continue all medications as prescribed. Follow up with electrophysiology      SECONDARY DISCHARGE DIAGNOSES  Diagnosis: Acute ITP  Assessment and Plan of Treatment: continue steroids. be sure to follow up with the hematologist    Diagnosis: Left atrial thrombus  Assessment and Plan of Treatment: This condition requires that you be on a blood thinner as part of treatment. Caution for signs of abnormal bleeding while on anticoagulation, this includes but is not limited to: excessively bleeding gums or nose bleeds, bleeding into urine, bleeding into stool (bright red blood or dark tarry colored stools), excessive bleeding after trauma or cuts. If this occur seek medical intervention immediately.

## 2020-03-04 NOTE — DISCHARGE NOTE PROVIDER - NSDCCPTREATMENT_GEN_ALL_CORE_FT
PRINCIPAL PROCEDURE  Procedure: Catheter ablation, cardiac, for ventricular tachycardia  Findings and Treatment: Continue all medications as prescribed. Follow up with Electrophysiology

## 2020-03-04 NOTE — PROGRESS NOTE ADULT - REASON FOR ADMISSION
SICD firing

## 2020-03-04 NOTE — PROGRESS NOTE ADULT - SUBJECTIVE AND OBJECTIVE BOX
Patient seen today in bed. Eager to go home. Offers no complaints    TELE: NSR with frequent APCs.     MEDICATIONS  (STANDING):  enoxaparin Injectable 110 milliGRAM(s) SubCutaneous <User Schedule>  metoprolol succinate  milliGRAM(s) Oral <User Schedule>  mupirocin 2% Ointment 1 Application(s) Topical two times a day  predniSONE   Tablet 40 milliGRAM(s) Oral <User Schedule>  quiNIDine sulfate 200 milliGRAM(s) Oral every 8 hours    MEDICATIONS  (PRN):  acetaminophen   Tablet .. 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 s/p oral steroid tx with normalization of platelet count  Atrial fibrillation  Obstructive sleep apnea: CPAP  History of ventricular fibrillation: last ICD shock 8/2019  S/P ICD (internal cardiac defibrillator) procedure: SQ ICD s/p 4 revisions last 9/2019    Vital Signs Last 24 Hrs  T(C): 36.7 (04 Mar 2020 10:00), Max: 36.8 (03 Mar 2020 16:30)  T(F): 98 (04 Mar 2020 10:00), Max: 98.3 (03 Mar 2020 16:30)  HR: 98 (04 Mar 2020 10:00) (71 - 98)  BP: 119/79 (04 Mar 2020 10:00) (97/59 - 119/79)  RR: 18 (04 Mar 2020 10:00) (16 - 18)  SpO2: 99% (04 Mar 2020 10:00) (95% - 99%)    Physical Exam:  Constitutional: NAD, AAOx3  Cardiovascular: +S1S2 RRR  Pulmonary: CTA b/l, unlabored  GI: soft NTND +BS  Extremities: no pedal edema,   Neuro: non focal, MARTINS x4    LABS:                        13.2   8.53  )-----------( 126      ( 04 Mar 2020 05:55 )             41.1     139  |  102  |  20.0  ----------------------------<  134<H>  5.0   |  25.0  |  0.77  Ca    8.8      04 Mar 2020 05:55  Phos  3.7     03-03  Mg     2.2     03-04    A/P  29 year old male with ITP (on steroids, followed by Dr. Lesvia Ye), MICHELLE on CPAP, paroxysmal AF, and idiopathic VF s/p S-ICD and recurrent VT/VF who is now s/p successful ablation of moderator band and RAA APCs.     No telemetry evidence of NSVT or PVCs    - Continue Quinidine  - Start and continue Eliquis  - Repeat NEVAEH in 3 months time.  - Check CBC in one weeks time at office  - Discharge home today ok from EP perspective.

## 2020-03-04 NOTE — DISCHARGE NOTE NURSING/CASE MANAGEMENT/SOCIAL WORK - PATIENT PORTAL LINK FT
You can access the FollowMyHealth Patient Portal offered by [facility] by registering at the following website: [email or web address]. By joining [facility]’s FollowMyHealth portal, you will also be able to view your health information using other applications (apps) compatible with our system.

## 2020-03-04 NOTE — DISCHARGE NOTE PROVIDER - NSDCFUADDINST_GEN_ALL_CORE_FT
XRAY CHEST: Left subcutaneous defibrillator again noted.    Lung fields and pleural surfaces are unremarkable.    Chest is similar to September 21, 2019.    IMPRESSION: Unchanged chest showing subcutaneous defibrillator.    Troponin T, Serum: <0.01: Reference Interval for Troponin T  Less than 0.06 ng/mL - includes the 99th percentile of a healthy  population at a method C.V. of 10% or less.  NOTE: Troponin T is measured by the Roche CLIA method and these  results are not interchangable with Troponin I results since they are  different assays with different reference intervals. ng/mL (02.28.20 @ 09:50)  < end of copied text >    03-04    139  |  102  |  20.0  ----------------------------<  134<H>  5.0   |  25.0  |  0.77    Ca    8.8      04 Mar 2020 05:55  Phos  3.7     03-03  Mg     2.2     03-04                            13.2   8.53  )-----------( 126      ( 04 Mar 2020 05:55 )             41.1

## 2020-03-04 NOTE — DISCHARGE NOTE PROVIDER - PROVIDER TOKENS
PROVIDER:[TOKEN:[[id number]],FOLLOWUP:[1-3 days]],PROVIDER:[TOKEN:[[id number]],FOLLOWUP:[2 weeks]]

## 2020-04-02 RX ORDER — NADOLOL 80 MG/1
80 TABLET ORAL
Qty: 60 | Refills: 0 | Status: DISCONTINUED | COMMUNITY
Start: 2020-02-27 | End: 2020-04-02

## 2020-04-07 ENCOUNTER — APPOINTMENT (OUTPATIENT)
Dept: ELECTROPHYSIOLOGY | Facility: CLINIC | Age: 30
End: 2020-04-07
Payer: COMMERCIAL

## 2020-04-07 PROCEDURE — 93295 DEV INTERROG REMOTE 1/2/MLT: CPT

## 2020-04-07 PROCEDURE — 93296 REM INTERROG EVL PM/IDS: CPT

## 2020-04-28 ENCOUNTER — APPOINTMENT (OUTPATIENT)
Dept: ELECTROPHYSIOLOGY | Facility: CLINIC | Age: 30
End: 2020-04-28
Payer: COMMERCIAL

## 2020-04-28 VITALS
BODY MASS INDEX: 35.55 KG/M2 | HEIGHT: 69 IN | OXYGEN SATURATION: 99 % | SYSTOLIC BLOOD PRESSURE: 129 MMHG | DIASTOLIC BLOOD PRESSURE: 75 MMHG | HEART RATE: 58 BPM | WEIGHT: 240 LBS

## 2020-04-28 PROCEDURE — 99213 OFFICE O/P EST LOW 20 MIN: CPT | Mod: 95

## 2020-04-28 NOTE — REVIEW OF SYSTEMS
[Feeling Fatigued] : not feeling fatigued [Headache] : no headache [Shortness Of Breath] : no shortness of breath [Dyspnea on exertion] : not dyspnea during exertion [Chest Pain] : no chest pain [Chest  Pressure] : no chest pressure [Cough] : no cough [Lower Ext Edema] : no extremity edema [Palpitations] : palpitations [Vomiting] : no vomiting [Nausea] : no nausea [Dizziness] : no dizziness [Easy Bleeding] : no tendency for easy bleeding [Easy Bruising] : no tendency for easy bruising

## 2020-04-28 NOTE — DISCUSSION/SUMMARY
[FreeTextEntry1] : Mr. Venegas is a 29 year old male who was evaluated today via telehealth services s/p ablation on 3/2/20. \par \par History significant for ITP ( now being monitored off steroids, followed by Dr. JACEY Ye, MICHELLE on CPAP, PAF, and idiopathic VF s/p ICD and recurrent VT/VF who is now s/p successful ablation of moderator band and RAA APC's on 3/2/20.  Quinidine was initiated in the hospital, and anticoagulation with Eliquis for questionable ECTOR thrombus see on NEVAEH post procedure.  \par \par SInce procedure he has felt well, complaining of flutters only. Denies chest pain, shortness of breath, lightheadedness, dizziness, near syncope, syncope or ICD firings. Tolerating Eliquis without complaints of easy bruising/bleeding, still actively following with hematology and was told all blood work was normal therefore being monitored closely off steroids. \par \par Remote ICD interrogation from 4/14/20 reveals S-ICD with normal device function. Electrode impedance ok, Battery life to ROSY -88%.  No ventricular episodes since ablation. PAF burden 8%. \par \par -As discussed with DR. Barkley, with consideration of risk of neetu COVID 19 with hospitalization, to continue Quinide with close remote ICD monitoring.  To consider weaning during next follow up.  If recurrent VF noted, to consider replacing S-ICD with transvenous dual chamber system, to allow for higher pacing rates to suppress future VF events.\par - PAF burden stable, with mild symptomatology. To consider arranging ablation for atrial fibrillation after repeat NEVAEH to rule out ECTOR thrombus after next follow up.  Continue Eliquis\par - Continue close follow up with hematology for management of ITP \par \par Mr. Venegas will follow up in office in 3 months, or sooner if remote ICD monitoring warrants. He was urged to contact us if any symptoms develop.\par \par Maryan Bernstein ANP-C \par \par

## 2020-04-28 NOTE — HISTORY OF PRESENT ILLNESS
[Home] : at home, [unfilled] , at the time of the visit. [Medical Office: ([facility])___] : at the medical office located in  [Spouse] : spouse [Patient] : the patient [Self] : self [FreeTextEntry1] : Time Initiated: 09:50 am, Time Completed : 10:05 am \par \par Mr. Venegas is a 29 year old male who was evaluated today via telehealth services s/p ablation on 3/2/20. \par \par History significant for ITP ( now being monitored off steroids, followed by Dr. JACEY Ye, MICHELLE on CPAP, PAF, and idiopathic VF s/p ICD and recurrent VT/VF who is now s/p successful ablation of moderator band and RAA APC's on 3/2/20.  Quinidine was initiated in the hospital, and anticoagulation with Eliquis for questionable ECTOR thrombus see on NEVAEH post procedure.  \par \par SInce procedure he has felt well, complaining of flutters only. Denies chest pain, shortness of breath, lightheadedness, dizziness, near syncope, syncope or ICD firings. Tolerating Eliquis without complaints of easy bruising/bleeding, still actively following with hematology and was told all blood work was normal therefore being monitored closely off steroids. \par \par Remote ICD interrogation from 4/14/20 reveals S-ICD with normal device function. Electrode impedance ok, Battery life to ROSY -88%.  No ventricular episodes since ablation. PAF burden 8%.  [FreeTextEntry2] : Dio Venegas [FreeTextEntry4] : CLAUDIA Matthew

## 2020-05-18 NOTE — CARDIOLOGY SUMMARY
Called patient left message to call office back to schedule US and New patient with MD.   [No Ischemia] : no Ischemia [No Symptoms] : no Symptoms [___] : [unfilled]

## 2020-07-07 ENCOUNTER — APPOINTMENT (OUTPATIENT)
Dept: ELECTROPHYSIOLOGY | Facility: CLINIC | Age: 30
End: 2020-07-07

## 2020-07-09 ENCOUNTER — APPOINTMENT (OUTPATIENT)
Dept: ELECTROPHYSIOLOGY | Facility: CLINIC | Age: 30
End: 2020-07-09
Payer: COMMERCIAL

## 2020-07-09 PROCEDURE — 93296 REM INTERROG EVL PM/IDS: CPT

## 2020-07-09 PROCEDURE — 93295 DEV INTERROG REMOTE 1/2/MLT: CPT

## 2020-08-04 ENCOUNTER — APPOINTMENT (OUTPATIENT)
Dept: ELECTROPHYSIOLOGY | Facility: CLINIC | Age: 30
End: 2020-08-04
Payer: COMMERCIAL

## 2020-08-04 NOTE — HISTORY OF PRESENT ILLNESS
[de-identified] : Mr. Venegas is a 30 year old male with history significant for ITP ( now being monitored off steroids, followed by Dr. JACEY Ye), MICHELLE on CPAP, PAF, and idiopathic VF s/p ICD and recurrent VT/VF who is now s/p successful ablation of moderator band and RAA APC's on 3/2/20. Quinidine was initiated in the hospital, and anticoagulation with Eliquis for questionable ECTOR thrombus see on NEVAEH post procedure. \par \par S-ICD Interrogation reveals_______________. \par \par INCOMPLETE NOTE NOT YET SEEN \par

## 2020-08-25 ENCOUNTER — NON-APPOINTMENT (OUTPATIENT)
Age: 30
End: 2020-08-25

## 2020-08-25 ENCOUNTER — APPOINTMENT (OUTPATIENT)
Dept: ELECTROPHYSIOLOGY | Facility: CLINIC | Age: 30
End: 2020-08-25
Payer: COMMERCIAL

## 2020-08-25 VITALS
DIASTOLIC BLOOD PRESSURE: 75 MMHG | HEIGHT: 69 IN | OXYGEN SATURATION: 100 % | BODY MASS INDEX: 35.84 KG/M2 | WEIGHT: 242 LBS | SYSTOLIC BLOOD PRESSURE: 111 MMHG | HEART RATE: 95 BPM | TEMPERATURE: 96.9 F

## 2020-08-25 DIAGNOSIS — T82.198A OTHER MECHANICAL COMPLICATION OF OTHER CARDIAC ELECTRONIC DEVICE, INITIAL ENCOUNTER: ICD-10-CM

## 2020-08-25 PROCEDURE — 93000 ELECTROCARDIOGRAM COMPLETE: CPT | Mod: 59

## 2020-08-25 PROCEDURE — 93261 INTERROGATE SUBQ DEFIB: CPT

## 2020-08-25 PROCEDURE — 99214 OFFICE O/P EST MOD 30 MIN: CPT | Mod: 25

## 2020-08-25 PROCEDURE — 99214 OFFICE O/P EST MOD 30 MIN: CPT

## 2020-08-25 NOTE — REVIEW OF SYSTEMS
[Feeling Fatigued] : feeling fatigued [Palpitations] : palpitations [Headache] : no headache [Shortness Of Breath] : no shortness of breath [Dyspnea on exertion] : not dyspnea during exertion [Lower Ext Edema] : no extremity edema [Chest Pain] : no chest pain [Cough] : no cough [Dizziness] : no dizziness [Easy Bleeding] : no tendency for easy bleeding [Easy Bruising] : no tendency for easy bruising

## 2020-08-25 NOTE — PROCEDURE
[No] : not [NSR] : normal sinus rhythm [ICD] : Implantable cardioverter-defibrillator [Longevity: ___ months] : The estimated remaining battery life is [unfilled] months [de-identified] : [employer]  [de-identified] : Ranjith  S-ICD [de-identified] : 09/20/19 [de-identified] : 50784 [de-identified] : Impedance status OK, Battery to ROSY 84%

## 2020-08-25 NOTE — PHYSICAL EXAM
[General Appearance - Well Developed] : well developed [Heart Rate And Rhythm] : heart rate and rhythm were normal [General Appearance - Well Nourished] : well nourished [Heart Sounds] : normal S1 and S2 [Murmurs] : no murmurs present [] : no respiratory distress [Arterial Pulses Normal] : the arterial pulses were normal [Edema] : no peripheral edema present

## 2020-08-25 NOTE — DISCUSSION/SUMMARY
[FreeTextEntry1] : Mr. Venegas is a 30 year old male with history significant for ITP ( now being monitored off steroids, followed by Dr. JACEY Ye), MICHELLE on CPAP, PAF with RVR leading to inappropriate ICD therapy, and idiopathic VF s/p ICD and recurrent VT/VF who is now s/p successful ablation of moderator band and RAA APC's on 3/2/20. Quinidine was initiated in the hospital, and anticoagulation with Eliquis for questionable ECTOR thrombus see on NEVAEH post procedure. \par \par S-ICD Interrogation reveals no ventricular episodes since last interrogation. AF burden 5 %. Patient reports he has been feeling well, is aware of AF intermittently but has not been highly bothersome.  Still taking Quinide but only twice daily. He admits to med non-compliance at times when working long hours, can forget at times. \par \par Recommendation:\par \par To discontinue Quinidine. Continue Toprol 100 mg daily, and Eliquis. Recommend proceeding with af ablation as originally planned due to RVR in the past leading to inappropriate shock therapy. He is agreeable. NEVAEH to be arranged prior to procedure.\par \par Maryan Bernstein ANP-C \par

## 2020-08-25 NOTE — END OF VISIT
[FreeTextEntry3] : I have personally seen, examined, and participated in the care of this patient. I have reviewed all pertinent clinical information, including history, physical exam, plan, and the PA/NP's note and agree except as noted below.\par \par Doing well on BID Quinidine dosing without any further shocks. ECG continues to show RBBB, likely from ablation of the moderator band. Since he has done well, we will discontinue Quinidine to see how he does.\par \par We again discussed AF ablation. Since he has had AF induced ICD shocks, I felt it was important for him to pursue AF ablation. He is amenable to proceeding after reviewing all risks, benefits and alternatives.\par \par Plan:\par - DC Quinidine\par - AF ablation (will need NEVAEH to rule out ECTOR thrombus which was questionable on prior NEVAEH)\par \par Aydin Barkley MD, FACC, RS\par Clinical Cardiac Electrophysiology

## 2020-08-25 NOTE — HISTORY OF PRESENT ILLNESS
[de-identified] : Mr. Venegas is a 30 year old male with history significant for ITP ( now being monitored off steroids, followed by Dr. JACEY Ye), MICHELLE on CPAP, PAF with RVR leading to inappropriate ICD therapy, and idiopathic VF s/p ICD and recurrent VT/VF who is now s/p successful ablation of moderator band and RAA APC's on 3/2/20. Quinidine was initiated in the hospital, and anticoagulation with Eliquis for questionable ECTOR thrombus see on NEVAEH post procedure. \par \par S-ICD Interrogation reveals no ventricular episodes since last interrogation. AF burden 5 %. Patient reports he has been feeling well, is aware of AF intermittently but has not been highly bothersome.  Still taking Quinide but only twice daily. He admits to med non-compliance at times when working long hours, can forget at times. \par \par \par . \par

## 2020-10-12 ENCOUNTER — APPOINTMENT (OUTPATIENT)
Dept: ELECTROPHYSIOLOGY | Facility: CLINIC | Age: 30
End: 2020-10-12
Payer: COMMERCIAL

## 2020-10-12 PROCEDURE — 93296 REM INTERROG EVL PM/IDS: CPT

## 2020-10-12 PROCEDURE — 93295 DEV INTERROG REMOTE 1/2/MLT: CPT

## 2020-10-24 ENCOUNTER — APPOINTMENT (OUTPATIENT)
Dept: DISASTER EMERGENCY | Facility: CLINIC | Age: 30
End: 2020-10-24

## 2020-10-24 DIAGNOSIS — Z01.818 ENCOUNTER FOR OTHER PREPROCEDURAL EXAMINATION: ICD-10-CM

## 2020-10-25 ENCOUNTER — NON-APPOINTMENT (OUTPATIENT)
Age: 30
End: 2020-10-25

## 2020-10-25 LAB — SARS-COV-2 N GENE NPH QL NAA+PROBE: NOT DETECTED

## 2020-10-26 ENCOUNTER — TRANSCRIPTION ENCOUNTER (OUTPATIENT)
Age: 30
End: 2020-10-26

## 2020-10-26 ENCOUNTER — INPATIENT (INPATIENT)
Facility: HOSPITAL | Age: 30
LOS: 0 days | Discharge: HOME | DRG: 274 | End: 2020-10-27
Attending: INTERNAL MEDICINE | Admitting: INTERNAL MEDICINE
Payer: COMMERCIAL

## 2020-10-26 VITALS
DIASTOLIC BLOOD PRESSURE: 69 MMHG | HEART RATE: 69 BPM | TEMPERATURE: 98 F | HEIGHT: 69 IN | RESPIRATION RATE: 20 BRPM | OXYGEN SATURATION: 98 % | WEIGHT: 244.93 LBS | SYSTOLIC BLOOD PRESSURE: 121 MMHG

## 2020-10-26 DIAGNOSIS — Z95.810 PRESENCE OF AUTOMATIC (IMPLANTABLE) CARDIAC DEFIBRILLATOR: Chronic | ICD-10-CM

## 2020-10-26 DIAGNOSIS — I48.0 PAROXYSMAL ATRIAL FIBRILLATION: ICD-10-CM

## 2020-10-26 LAB
ANION GAP SERPL CALC-SCNC: 13 MMOL/L — SIGNIFICANT CHANGE UP (ref 5–17)
ANION GAP SERPL CALC-SCNC: 13 MMOL/L — SIGNIFICANT CHANGE UP (ref 5–17)
APTT BLD: 30.4 SEC — SIGNIFICANT CHANGE UP (ref 27.5–35.5)
BLD GP AB SCN SERPL QL: SIGNIFICANT CHANGE UP
BUN SERPL-MCNC: 11 MG/DL — SIGNIFICANT CHANGE UP (ref 8–20)
BUN SERPL-MCNC: 14 MG/DL — SIGNIFICANT CHANGE UP (ref 8–20)
CALCIUM SERPL-MCNC: 8.1 MG/DL — LOW (ref 8.6–10.2)
CALCIUM SERPL-MCNC: 8.8 MG/DL — SIGNIFICANT CHANGE UP (ref 8.6–10.2)
CHLORIDE SERPL-SCNC: 100 MMOL/L — SIGNIFICANT CHANGE UP (ref 98–107)
CHLORIDE SERPL-SCNC: 104 MMOL/L — SIGNIFICANT CHANGE UP (ref 98–107)
CO2 SERPL-SCNC: 22 MMOL/L — SIGNIFICANT CHANGE UP (ref 22–29)
CO2 SERPL-SCNC: 25 MMOL/L — SIGNIFICANT CHANGE UP (ref 22–29)
CREAT SERPL-MCNC: 0.66 MG/DL — SIGNIFICANT CHANGE UP (ref 0.5–1.3)
CREAT SERPL-MCNC: 0.69 MG/DL — SIGNIFICANT CHANGE UP (ref 0.5–1.3)
GLUCOSE SERPL-MCNC: 110 MG/DL — HIGH (ref 70–99)
GLUCOSE SERPL-MCNC: 93 MG/DL — SIGNIFICANT CHANGE UP (ref 70–99)
HCT VFR BLD CALC: 39.6 % — SIGNIFICANT CHANGE UP (ref 39–50)
HGB BLD-MCNC: 13 G/DL — SIGNIFICANT CHANGE UP (ref 13–17)
INR BLD: 1.05 RATIO — SIGNIFICANT CHANGE UP (ref 0.88–1.16)
MAGNESIUM SERPL-MCNC: 1.7 MG/DL — SIGNIFICANT CHANGE UP (ref 1.6–2.6)
MAGNESIUM SERPL-MCNC: 1.9 MG/DL — SIGNIFICANT CHANGE UP (ref 1.6–2.6)
MCHC RBC-ENTMCNC: 27.4 PG — SIGNIFICANT CHANGE UP (ref 27–34)
MCHC RBC-ENTMCNC: 32.8 GM/DL — SIGNIFICANT CHANGE UP (ref 32–36)
MCV RBC AUTO: 83.4 FL — SIGNIFICANT CHANGE UP (ref 80–100)
PLATELET # BLD AUTO: 107 K/UL — LOW (ref 150–400)
POTASSIUM SERPL-MCNC: 3.9 MMOL/L — SIGNIFICANT CHANGE UP (ref 3.5–5.3)
POTASSIUM SERPL-MCNC: 4.3 MMOL/L — SIGNIFICANT CHANGE UP (ref 3.5–5.3)
POTASSIUM SERPL-SCNC: 3.9 MMOL/L — SIGNIFICANT CHANGE UP (ref 3.5–5.3)
POTASSIUM SERPL-SCNC: 4.3 MMOL/L — SIGNIFICANT CHANGE UP (ref 3.5–5.3)
PROTHROM AB SERPL-ACNC: 12.1 SEC — SIGNIFICANT CHANGE UP (ref 10.6–13.6)
RBC # BLD: 4.75 M/UL — SIGNIFICANT CHANGE UP (ref 4.2–5.8)
RBC # FLD: 13.1 % — SIGNIFICANT CHANGE UP (ref 10.3–14.5)
SODIUM SERPL-SCNC: 138 MMOL/L — SIGNIFICANT CHANGE UP (ref 135–145)
SODIUM SERPL-SCNC: 139 MMOL/L — SIGNIFICANT CHANGE UP (ref 135–145)
WBC # BLD: 4.89 K/UL — SIGNIFICANT CHANGE UP (ref 3.8–10.5)
WBC # FLD AUTO: 4.89 K/UL — SIGNIFICANT CHANGE UP (ref 3.8–10.5)

## 2020-10-26 PROCEDURE — 93623 PRGRMD STIMJ&PACG IV RX NFS: CPT | Mod: 26

## 2020-10-26 PROCEDURE — 93010 ELECTROCARDIOGRAM REPORT: CPT | Mod: 77

## 2020-10-26 PROCEDURE — 93662 INTRACARDIAC ECG (ICE): CPT | Mod: 26

## 2020-10-26 PROCEDURE — 93656 COMPRE EP EVAL ABLTJ ATR FIB: CPT

## 2020-10-26 PROCEDURE — 93312 ECHO TRANSESOPHAGEAL: CPT | Mod: 26

## 2020-10-26 PROCEDURE — 93613 INTRACARDIAC EPHYS 3D MAPG: CPT

## 2020-10-26 PROCEDURE — 93655 ICAR CATH ABLTJ DSCRT ARRHYT: CPT

## 2020-10-26 PROCEDURE — 93320 DOPPLER ECHO COMPLETE: CPT | Mod: 26

## 2020-10-26 PROCEDURE — 93657 TX L/R ATRIAL FIB ADDL: CPT

## 2020-10-26 PROCEDURE — 93325 DOPPLER ECHO COLOR FLOW MAPG: CPT | Mod: 26

## 2020-10-26 RX ORDER — OXYCODONE AND ACETAMINOPHEN 5; 325 MG/1; MG/1
1 TABLET ORAL EVERY 6 HOURS
Refills: 0 | Status: DISCONTINUED | OUTPATIENT
Start: 2020-10-26 | End: 2020-10-27

## 2020-10-26 RX ORDER — APIXABAN 2.5 MG/1
5 TABLET, FILM COATED ORAL
Refills: 0 | Status: DISCONTINUED | OUTPATIENT
Start: 2020-10-26 | End: 2020-10-27

## 2020-10-26 RX ORDER — PANTOPRAZOLE SODIUM 20 MG/1
40 TABLET, DELAYED RELEASE ORAL EVERY 12 HOURS
Refills: 0 | Status: DISCONTINUED | OUTPATIENT
Start: 2020-10-26 | End: 2020-10-27

## 2020-10-26 RX ORDER — SUCRALFATE 1 G
1 TABLET ORAL
Refills: 0 | Status: DISCONTINUED | OUTPATIENT
Start: 2020-10-26 | End: 2020-10-27

## 2020-10-26 RX ORDER — FUROSEMIDE 40 MG
20 TABLET ORAL
Refills: 0 | Status: DISCONTINUED | OUTPATIENT
Start: 2020-10-26 | End: 2020-10-27

## 2020-10-26 RX ORDER — METOPROLOL TARTRATE 50 MG
100 TABLET ORAL DAILY
Refills: 0 | Status: DISCONTINUED | OUTPATIENT
Start: 2020-10-26 | End: 2020-10-27

## 2020-10-26 RX ORDER — MAGNESIUM SULFATE 500 MG/ML
2 VIAL (ML) INJECTION ONCE
Refills: 0 | Status: COMPLETED | OUTPATIENT
Start: 2020-10-26 | End: 2020-10-26

## 2020-10-26 RX ORDER — ACETAMINOPHEN 500 MG
650 TABLET ORAL EVERY 6 HOURS
Refills: 0 | Status: DISCONTINUED | OUTPATIENT
Start: 2020-10-26 | End: 2020-10-27

## 2020-10-26 RX ORDER — FUROSEMIDE 40 MG
20 TABLET ORAL DAILY
Refills: 0 | Status: DISCONTINUED | OUTPATIENT
Start: 2020-10-27 | End: 2020-10-27

## 2020-10-26 RX ORDER — ONDANSETRON 8 MG/1
4 TABLET, FILM COATED ORAL
Refills: 0 | Status: DISCONTINUED | OUTPATIENT
Start: 2020-10-26 | End: 2020-10-27

## 2020-10-26 RX ADMIN — Medication 50 GRAM(S): at 18:45

## 2020-10-26 RX ADMIN — Medication 20 MILLIGRAM(S): at 18:54

## 2020-10-26 RX ADMIN — PANTOPRAZOLE SODIUM 40 MILLIGRAM(S): 20 TABLET, DELAYED RELEASE ORAL at 18:45

## 2020-10-26 RX ADMIN — APIXABAN 5 MILLIGRAM(S): 2.5 TABLET, FILM COATED ORAL at 18:54

## 2020-10-26 RX ADMIN — Medication 1 GRAM(S): at 18:46

## 2020-10-26 RX ADMIN — Medication 100 MILLIGRAM(S): at 20:27

## 2020-10-26 NOTE — DISCHARGE NOTE PROVIDER - NSDCMRMEDTOKEN_GEN_ALL_CORE_FT
Eliquis 5 mg oral tablet: 1 tab(s) orally 2 times a day   metoprolol succinate 100 mg oral tablet, extended release: 1 tab(s) orally once a day    Eliquis 5 mg oral tablet: 1 tab(s) orally 2 times a day   furosemide 20 mg oral tablet: 1 tab(s) orally once a day x 2 days and then stop  metoprolol succinate 100 mg oral tablet, extended release: 1 tab(s) orally once a day   pantoprazole 40 mg oral delayed release tablet: 1 tab(s) orally every 12 hours x 14 days then once daily for 6 week  sucralfate 1 g/10 mL oral suspension: 10 milliliter(s) orally 2 times a day

## 2020-10-26 NOTE — DISCHARGE NOTE PROVIDER - HOSPITAL COURSE
Plan:   Continue Eliquis 5mg PO BID   Continue metoprolol 100mg po daily   Continue other home medications.   Start Protonix 40mg po bid x 4 weeks and then decrease to daily x 45 days.   Carafate 1gm BID x 2 week.   Lasix 20mg po daily x 2 more days      30 year old male with PMH of ITP (off steroids, followed by Dr. 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     Plan:   Access site care and activity limitations reviewed w/ pt.   Outpt f/up in 2-4 weeks.   Continue metoprolol 100mg po daily   Continue other home medications.   Start Protonix 40mg po bid x 4 weeks and then decrease to daily x 45 days.   Carafate 1gm BID x 2 week.   Lasix  20mg po daily x 2 days

## 2020-10-26 NOTE — PROGRESS NOTE ADULT - SUBJECTIVE AND OBJECTIVE BOX
PROCEDURE(S): Radiofrequency Ablation of Atrial Fibrillation    ELECTROPHYSIOLOGIST(S): Aydin Barkley MD         COMPLICATIONS:  none        DISPOSITION:  observation     CONDITION: stable      Pt doing well s/p NEVAEH, atrial fibrillation (WACA, right elma line) & right atrial appendage AT ablation (partial suppression achieved) via b/l FV (figure 8 sutures), currently in sinus rhythm with frequent polymorphic PVCs. (PVCs were also noted intraoperatively).  Resting comfortably. Denies complaint.   Prelim intraop NEVAEH per Dr Barkley: preserved LVFx, negative for ECTOR thrombus  BSCI S-ICD: tachy therapies activated post procedure  I/O: 2830cc/0cc      MEDICATIONS  (STANDING):  apixaban 5 milliGRAM(s) Oral <User Schedule>  furosemide   Injectable 20 milliGRAM(s) IV Push <User Schedule>  metoprolol succinate  milliGRAM(s) Oral daily  pantoprazole    Tablet 40 milliGRAM(s) Oral every 12 hours  sucralfate suspension 1 Gram(s) Oral two times a day    MEDICATIONS  (PRN):  acetaminophen   Tablet .. 650 milliGRAM(s) Oral every 6 hours PRN Mild Pain (1 - 3)  ondansetron Injectable 4 milliGRAM(s) IV Push four times a day PRN Nausea and/or Vomiting  oxycodone    5 mG/acetaminophen 325 mG 1 Tablet(s) Oral every 6 hours PRN Moderate Pain (4 - 6)    Exam:  T(C): 36.6 (10-26-20 @ 08:43), Max: 36.6 (10-26-20 @ 08:43)  HR: 68 SR  BP: 132/65  RR: 18  SpO2: 100% on 2L NC    VSS  Constitutional: obese, NAD, A&O x 3  Card: S1/S2, RRR, no m/g/r  Left axillary S-ICD site is WNL  Resp: lungs CTA b/l  Abd: S/NT/ND  Groins: hemostatic sutures in place, dsg C/D/I; no bleeding, hematoma, erythema, exudate or edema  Ext: no edema; distal pulses intact    ECG: sinus rhythm 79bpm w/ intact conduction, PVCs    Stress Test: 3/20/2017: No ischemia. No symptoms. Narrow complex and wide complex tachycardia in recovery suggestive of SVT and NSVT.  Cardiac MRI: 12/2013: Normal biventricular function. No LGE.  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  NEVAEH: 3/2/2020  Summary:   1. Left ventricular ejection fraction, by visual estimation, is 55 to 60%.   2. Normal global left ventricular systolic function.   3. There is small echodensity in the ECTOR appears to be attached in some views. A small thrombus vs. artifact cannot be excluded. Consider FU NEVAEH if clinically indicated.   4. Small patent foramen ovale, with predominantly left to right shuntingacross the atrial septum.   5. Color Doppler demonstrates the presence of a shunt at the Atrial level.  Device Interrogation 8/25/2020 BSCI S-ICD: No ventricular episodes. AF burden 5%    Assessment: 30 year old male with PMH of ITP (off steroids, followed by Dr. 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 (PVCs were also noted intraoperatively).     Plan:   Admit to telemetry/ONU  AM labs and ECG  Post op telemetry and ECG with PVCs reviewed with Dr Barkley- continue current medical regimen, no changes at this time. STAT BMP and Mag level  Bedrest, groin protocol/immobility x 4 hours and then OOB to chair and progress to ambulate as tolerated with post ambulation  groin checks  Groin sutures to be removed by EP service in AM.   Radial art line to be removed once pt fully awake with stable vitals >1 hour post op.   Pending groin status: Eliquis 5mg PO BID to resume at 8pm tonight.   Continue metoprolol 100mg po daily   PO pain analgesics prn  Continue other home medications.   Start Protonix 40mg po bid x 4 weeks and then decrease to daily x 45 days.   Carafate 1gm BID x 2 week.   Lasix 20mg IV x 1 tonight and the 20mg po daily x 2 days  Strict I/Os.  Please encourage incentive spirometry and ambulation once able.  Observation and monitoring on telemetry overnight with anticipated discharge in the AM and outpt follow up in 1 month, sooner if needed.                    post

## 2020-10-26 NOTE — H&P PST ADULT - NS SC CAGE ALCOHOL GUILTY ABOUT
Patient Instructions by More Dunn CMA at 09/29/17 10:35 AM     Author:  More Dunn CMA Service:  (none) Author Type:  Certified Medical Assistant     Filed:  09/29/17 10:38 AM Encounter Date:  9/29/2017 Status:  Addendum     :  More Dunn CMA (Certified Medical Assistant)            1. Diabetes   He must continue taking his insulin before he eats. He was again advised to take the 1st dose ~30 minutes before his 1st meal (around noon) and the 2nd dose ~30 minutes before his last meal (late-night snack).     -Increase Novolin 70/30 to 38 units qam but decrease to 20 units qpm   -Check BG at least 4 times/day   -Also advised to check BG before driving   -Pt advised to call me if BG < 70 or > 300 and not coming down     -Pt was asked to drop off/call in blood sugar readings in 2 weeks   -We discussed the importance of healthy dietary habits and regular exercise for achieving a healthy weight     -Check A1c before next appointment    RTC with Dr. Muniz in 3 months - Kyle Muniz MD is on 01/10/2018 at 11:30 AM in ENDOCRINOLOGY SEQ        Revision History        User Key Date/Time User Provider Type Action    > [N/A] 09/29/17 10:38 AM More Dunn CMA Certified Medical Assistant Addend     [N/A] 09/29/17 10:36 AM Cony Mi APN Nurse Practitioner Sign            
no

## 2020-10-26 NOTE — PROGRESS NOTE ADULT - SUBJECTIVE AND OBJECTIVE BOX
Admission Criteria  Please admit the patient to the following service:      Major Criteria:      - Continuous EKG monitoring is required for condition causing arrhythmia (hyperkalemia, etc) s/p AF ablation in patient with history of idiopathic VF     - Significant volume load > 200 ml      Admit to: (1 Major ciriteria/2 or more minor criteria) Patient is being admitted to the inpatient service due to high risk characteristics and need for further management/monitoring and is considered to be at a significantly increased risk of major adverse cardiac and vascular events if discharged.

## 2020-10-26 NOTE — DISCHARGE NOTE PROVIDER - NSDCCPTREATMENT_GEN_ALL_CORE_FT
PRINCIPAL PROCEDURE  Procedure: Intracardiac catheter ablation for atrial fibrillation  Findings and Treatment: - Bruising at the groin, sometimes extending down the leg, and/or a small lump under the skin at the groin access site is normal and will resolve within 2 – 3 weeks.   - Occasional skipped beats or palpitations that last for a few beats are common and generally resolve within 1-2 months.   - You may walk and take stairs at a regular pace.   - Do not perform any exercise more strenuous than walking for 1 week.   - Do not strain or lift heavy objects for 1 week.  - You may shower the day after the procedure.  - Do not soak in water (such as tub baths, hot tubs, swimming, etc.) for 1 week.   - You may resume all other activities the day after the procedure.  Call your doctor if:   - you notice bleeding, redness, drainage, swelling, increased tenderness or a hot sensation around the catheter insertion site.   - your temperature is greater than 100 degrees F for more than 24 hours.  - your rapid heart rhythm returns.  - you have any questions or concerns regarding the procedure.  If significant bleeding and/or a large lump (the size of a golf ball or bigger) occurs:  - Lie flat and apply continuous direct pressure just above the puncture site for at least 10 minutes  - If the issue resolves, notify your physician immediately.    - If the bleeding cannot be controlled, please seek immediate medical attention.  If you experience increased difficulty breathing or chest pain, or if you faint or have dizzy spells, please seek immediate medical attention.

## 2020-10-26 NOTE — H&P PST ADULT - HISTORY OF PRESENT ILLNESS
30 year old male with PMH of ITP (off steroids, followed by Dr. Lesvia Ye), MICHELLE on CPAP, paroxysmal AF, and idiopathic VF s/p S-ICD (2013-Dr Black @ [facility]) ICD with multiple revisions for premature battery failure and lead fracture most recent in 9/2019. who recently presented to ED after getting multiple inappropriate ICDshocks secondary to AF with RVR. Pt then had appropraite stocks for VT/VF and underwent successful abltion of monderator band and RAA APCs on 3/2/20 by Dr Barkley. Pt currently on Metoprolol 100mg and Eliquis.     Cardiology Summary:   Interogation BSCI S-ICD 30 year old male with PMH of ITP (off steroids, followed by Dr. Lesvia Ye), MICHELLE on CPAP, paroxysmal AF, and idiopathic VF s/p S-ICD in 2013-by Dr Black at [facility]. ICD has had multiple revisions for premature battery failure and lead fracture most recent in 9/2019. Pt also recently presented to ED after multiple ICD shocks and underwent successful ablation of moderator band and RAA APCs on 3/2/20 by Dr Barkley. NEVAEH was performed prior to procedure on  and a small ECTOR thrombus was noted and started on Eliquis.  Pt also with PAF with RVR leading to inappropriate ICD therapy and presents electively today for EPS, atrial fibrillation ablation with preceding NEVAEH.      Cardiology Summary:   NEVAEH: 3/2/2020  Summary:   1. Left ventricular ejection fraction, by visual estimation, is 55 to 60%.   2. Normal global left ventricular systolic function.   3. There is small echodensity in the ECTOR appears to be attached in some views. A small thrombus vs. artifact cannot be excluded. Consider FU NEVAEH if clinically indicated.   4. Small patent foramen ovale, with predominantly left to right shuntingacross the atrial septum.   5. Color Doppler demonstrates the presence of a shunt at the Atrial level.  Device Interrogation 8/25/2020 BSCI S-ICD: No ventricular episodes. AF burden 5% 30 year old male with PMH of ITP (off steroids, followed by Dr. Lesvia Ye), MICHELLE on CPAP, symptomatic paroxysmal AF (CHADsVASC 0) causing inappropriate ICD therapy in the past, and idiopathic VF s/p S-ICD in 2013-by Dr Black at [facility]. ICD has had multiple revisions for premature battery failure and lead fracture most recent in 9/2019.  Pt was scheduled to have an AF ablation in December of 2019, however during PST, platelets noted to be 62 and he was referred to a hematologist at that time where he was giving the diagnoses of ITP and started on steroids. Pt rescheduled his procedure for January of 2015 and platelets were noted to be 37 at that time and procedure was subsequently cancelled. In the interterm, patient had multiple ICD shocks for true VF and underwent successful ablation of moderator band and RAA APCs on 3/2/20 by Dr Barkley. NEVAEH was performed prior to procedure on  and a small ECTOR thrombus was noted and was started on Eliquis.  Pt presents electively today for EPS, atrial fibrillation ablation with preceding NEVAEH.      Cardiology Summary:  Stress Test: 3/20/2017: No ischemia. No symptoms. Narrow complex and wide complex tachycardia in recovery suggestive of SVT and NSVT.  Cardiac MRI: 12/2013: Normal biventricular function. No LGE.  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  NEVAEH: 3/2/2020  Summary:   1. Left ventricular ejection fraction, by visual estimation, is 55 to 60%.   2. Normal global left ventricular systolic function.   3. There is small echodensity in the ECTOR appears to be attached in some views. A small thrombus vs. artifact cannot be excluded. Consider FU NEVAEH if clinically indicated.   4. Small patent foramen ovale, with predominantly left to right shuntingacross the atrial septum.   5. Color Doppler demonstrates the presence of a shunt at the Atrial level.  Device Interrogation 8/25/2020 BSCI S-ICD: No ventricular episodes. AF burden 5%

## 2020-10-26 NOTE — ASU PATIENT PROFILE, ADULT - BLOOD TRANSFUSION, PREVIOUS, PROFILE
yes
[FreeTextEntry1] : Well visit and follow up for management of:\par Hyperlipidemia  -on meds\par COPD  -on meds

## 2020-10-26 NOTE — H&P PST ADULT - ATTENDING COMMENTS
I have personally seen, examined, and participated in the care of this patient. I have reviewed all pertinent clinical information, including history, physical exam, plan, and the PA/NP's note and agree except as noted below.    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     In 3/2020 he presented with multiple ICD shocks. All ICD shocks were preceded by a PVC inducing VF which had a left bundle, left superior axis with V5-V6 transition and relatively short QRS duration suggestive of moderator band PVC. He was started on Isoproterenol infusion with improvement in symptoms. Given recurrent VF, he underwent catheter ablation. Unfortunately no PVCs were seen and so empiric ablation was performed along the moderator band. He was noted to have PACs as well so ablation was performed of the PACs at the base of the right atrial appendage but these could not be extinguished. He was placed on Quinidine following ablation which he tolerated. He discontinued Quinidine in 8/2020 and has done well without recurrent VF episodes.    Interrogation of his S-ICD however has continued to demonstrate periods of atrial fibrillation. He was noted in the past to have AF induced VT and VF. As such, catheter ablation of atrial fibrillation was discussed. All risks, benefits and alternatives were discussed at length. Following our discussion, he agreed to proceed with EP study and ablation. NEVAEH will be performed beforehand to rule out LA/ECTOR thrombus.    Aydin Barkley MD  Clinical Cardiac Electrophysiology

## 2020-10-26 NOTE — H&P PST ADULT - ASSESSMENT
Plan:  Eliquis last took yesterday evening  NPO confirmed > 10 hours  Consent w/ Attending  Stat labs & ecg  Covid negative 30 year old male with PMH of ITP (off steroids, followed by Dr. Lesvia Ye), MICHELLE on CPAP, paroxysmal AF, and idiopathic VF s/p S-ICD in 2013-by Dr Black at [facility]. ICD has had multiple revisions for premature battery failure and lead fracture most recent in 9/2019. Pt also recently presented to ED after multiple ICD shocks and underwent successful ablation of moderator band and RAA APCs on 3/2/20 by Dr Barkley. NEVAEH was performed prior to procedure on  and a small ECTOR thrombus was noted and started on Eliquis.  Pt also with PAF with RVR leading to inappropriate ICD therapy and presents electively today for EPS, atrial fibrillation ablation with preceding NEVAEH.      Plan:  Eliquis last took yesterday evening  NPO confirmed > 10 hours  Consent w/ Attending  Stat labs & ecg  Covid negative 10/24/2020 30 year old male with PMH of ITP (off steroids, followed by Dr. Lesvia Ye), MICHELLE on CPAP, symptomatic paroxysmal AF (CHADsVASC 0) causing inappropriate ICD therapy in the past, and idiopathic VF s/p S-ICD in 2013-by Dr Black at [facility]. ICD has had multiple revisions for premature battery failure and lead fracture most recent in 9/2019.  Pt was scheduled to have an AF ablation in December of 2019, however during PST, platelets noted to be 62 and he was referred to a hematologist at that time where he was giving the diagnoses of ITP and started on steroids. Pt rescheduled his procedure for January of 2015 and platelets were noted to be 37 at that time and procedure was subsequently cancelled. In the interterm, patient had multiple ICD shocks for true VF and underwent successful ablation of moderator band and RAA APCs on 3/2/20 by Dr Barkley. NEVAEH was performed prior to procedure on  and a small ECTOR thrombus was noted and was started on Eliquis.  Pt presents electively today for EPS, atrial fibrillation ablation with preceding NEVAEH.      Plan:  Eliquis last took yesterday evening  NPO confirmed > 10 hours  Consent w/ Attending  Stat labs & ecg  Covid negative 10/24/2020

## 2020-10-26 NOTE — H&P PST ADULT - COMMENTS
No fevers, chills, CP, palp, SOB, Abd pain, N/V/D, hematuria, hemoptysis or bloody/dark colored stool

## 2020-10-26 NOTE — DISCHARGE NOTE PROVIDER - NSDCFUADDINST_GEN_ALL_CORE_FT
Our office will contact you in 3-5 days to schedule this appointment. Please call [phone] with questions or concerns.

## 2020-10-26 NOTE — DISCHARGE NOTE PROVIDER - CARE PROVIDER_API CALL
Aydin Barkley)  Cardiology; Internal Medicine  [address] [place] [address], [place] [address]  Phone: [phone]  Fax: [phone]  Follow Up Time: 1 month

## 2020-10-27 ENCOUNTER — TRANSCRIPTION ENCOUNTER (OUTPATIENT)
Age: 30
End: 2020-10-27

## 2020-10-27 VITALS
HEART RATE: 78 BPM | DIASTOLIC BLOOD PRESSURE: 63 MMHG | OXYGEN SATURATION: 99 % | SYSTOLIC BLOOD PRESSURE: 113 MMHG | RESPIRATION RATE: 17 BRPM

## 2020-10-27 LAB
ANION GAP SERPL CALC-SCNC: 13 MMOL/L — SIGNIFICANT CHANGE UP (ref 5–17)
BUN SERPL-MCNC: 9 MG/DL — SIGNIFICANT CHANGE UP (ref 8–20)
CALCIUM SERPL-MCNC: 8.3 MG/DL — LOW (ref 8.6–10.2)
CHLORIDE SERPL-SCNC: 103 MMOL/L — SIGNIFICANT CHANGE UP (ref 98–107)
CO2 SERPL-SCNC: 25 MMOL/L — SIGNIFICANT CHANGE UP (ref 22–29)
CREAT SERPL-MCNC: 0.72 MG/DL — SIGNIFICANT CHANGE UP (ref 0.5–1.3)
GLUCOSE SERPL-MCNC: 115 MG/DL — HIGH (ref 70–99)
HCT VFR BLD CALC: 37.8 % — LOW (ref 39–50)
HGB BLD-MCNC: 12.6 G/DL — LOW (ref 13–17)
MAGNESIUM SERPL-MCNC: 2.2 MG/DL — SIGNIFICANT CHANGE UP (ref 1.6–2.6)
MCHC RBC-ENTMCNC: 28 PG — SIGNIFICANT CHANGE UP (ref 27–34)
MCHC RBC-ENTMCNC: 33.3 GM/DL — SIGNIFICANT CHANGE UP (ref 32–36)
MCV RBC AUTO: 84 FL — SIGNIFICANT CHANGE UP (ref 80–100)
PLATELET # BLD AUTO: 104 K/UL — LOW (ref 150–400)
POTASSIUM SERPL-MCNC: 4 MMOL/L — SIGNIFICANT CHANGE UP (ref 3.5–5.3)
POTASSIUM SERPL-SCNC: 4 MMOL/L — SIGNIFICANT CHANGE UP (ref 3.5–5.3)
RBC # BLD: 4.5 M/UL — SIGNIFICANT CHANGE UP (ref 4.2–5.8)
RBC # FLD: 13.2 % — SIGNIFICANT CHANGE UP (ref 10.3–14.5)
SODIUM SERPL-SCNC: 140 MMOL/L — SIGNIFICANT CHANGE UP (ref 135–145)
WBC # BLD: 9.88 K/UL — SIGNIFICANT CHANGE UP (ref 3.8–10.5)
WBC # FLD AUTO: 9.88 K/UL — SIGNIFICANT CHANGE UP (ref 3.8–10.5)

## 2020-10-27 PROCEDURE — 86901 BLOOD TYPING SEROLOGIC RH(D): CPT

## 2020-10-27 PROCEDURE — 86900 BLOOD TYPING SEROLOGIC ABO: CPT

## 2020-10-27 PROCEDURE — C1894: CPT

## 2020-10-27 PROCEDURE — C1893: CPT

## 2020-10-27 PROCEDURE — 93325 DOPPLER ECHO COLOR FLOW MAPG: CPT

## 2020-10-27 PROCEDURE — C1766: CPT

## 2020-10-27 PROCEDURE — 36415 COLL VENOUS BLD VENIPUNCTURE: CPT

## 2020-10-27 PROCEDURE — 93005 ELECTROCARDIOGRAM TRACING: CPT

## 2020-10-27 PROCEDURE — 85730 THROMBOPLASTIN TIME PARTIAL: CPT

## 2020-10-27 PROCEDURE — 93613 INTRACARDIAC EPHYS 3D MAPG: CPT

## 2020-10-27 PROCEDURE — 93662 INTRACARDIAC ECG (ICE): CPT

## 2020-10-27 PROCEDURE — 93320 DOPPLER ECHO COMPLETE: CPT

## 2020-10-27 PROCEDURE — C1732: CPT

## 2020-10-27 PROCEDURE — 85610 PROTHROMBIN TIME: CPT

## 2020-10-27 PROCEDURE — 80048 BASIC METABOLIC PNL TOTAL CA: CPT

## 2020-10-27 PROCEDURE — C1889: CPT

## 2020-10-27 PROCEDURE — C1730: CPT

## 2020-10-27 PROCEDURE — 93623 PRGRMD STIMJ&PACG IV RX NFS: CPT

## 2020-10-27 PROCEDURE — 93312 ECHO TRANSESOPHAGEAL: CPT

## 2020-10-27 PROCEDURE — 85027 COMPLETE CBC AUTOMATED: CPT

## 2020-10-27 PROCEDURE — 83735 ASSAY OF MAGNESIUM: CPT

## 2020-10-27 PROCEDURE — 93657 TX L/R ATRIAL FIB ADDL: CPT

## 2020-10-27 PROCEDURE — C1731: CPT

## 2020-10-27 PROCEDURE — 86850 RBC ANTIBODY SCREEN: CPT

## 2020-10-27 PROCEDURE — 93010 ELECTROCARDIOGRAM REPORT: CPT

## 2020-10-27 PROCEDURE — 93655 ICAR CATH ABLTJ DSCRT ARRHYT: CPT

## 2020-10-27 PROCEDURE — 93656 COMPRE EP EVAL ABLTJ ATR FIB: CPT

## 2020-10-27 RX ORDER — FUROSEMIDE 40 MG
1 TABLET ORAL
Qty: 2 | Refills: 0
Start: 2020-10-27 | End: 2020-10-28

## 2020-10-27 RX ORDER — PANTOPRAZOLE SODIUM 20 MG/1
1 TABLET, DELAYED RELEASE ORAL
Qty: 70 | Refills: 0
Start: 2020-10-27

## 2020-10-27 RX ORDER — SUCRALFATE 1 G
10 TABLET ORAL
Qty: 300 | Refills: 0
Start: 2020-10-27 | End: 2020-11-09

## 2020-10-27 RX ADMIN — PANTOPRAZOLE SODIUM 40 MILLIGRAM(S): 20 TABLET, DELAYED RELEASE ORAL at 06:31

## 2020-10-27 RX ADMIN — Medication 20 MILLIGRAM(S): at 06:31

## 2020-10-27 RX ADMIN — Medication 1 GRAM(S): at 06:31

## 2020-10-27 RX ADMIN — APIXABAN 5 MILLIGRAM(S): 2.5 TABLET, FILM COATED ORAL at 06:31

## 2020-10-27 NOTE — DISCHARGE NOTE NURSING/CASE MANAGEMENT/SOCIAL WORK - PATIENT PORTAL LINK FT
You can access the FollowMyHealth Patient Portal offered by [facility] by registering at the following website: [email or web address]. By joining [facility]’s FollowMyHealth portal, you will also be able to view your health information using other applications (apps) compatible with our system.

## 2020-10-27 NOTE — PROGRESS NOTE ADULT - SUBJECTIVE AND OBJECTIVE BOX
Pt doing well POD #1 s/p NEVAEH, atrial fibrillation (WACA, right elma line) & right atrial appendage AT ablation (partial suppression achieved) via b/l FV (figure 8 sutures). Pt seen and examined this morning, denies complaints. Sutures removed without incident. Morning labs and telemetry reviewed. Postoperatively patient with frequent polymorphic PVCs which have decreased in frequency. Pt reports feeling well. Denies any Cp, palp or SOB.     Prelim intraop NEVAEH as per Dr Barkley: preserved LVFx, negative for ECTOR thrombus official pending)    EKG:  TELE: Sinus with occasional PVCs and episodes of sinus bradycardia    MEDICATIONS  (STANDING):  apixaban 5 milliGRAM(s) Oral <User Schedule>  furosemide    Tablet 20 milliGRAM(s) Oral daily  furosemide   Injectable 20 milliGRAM(s) IV Push <User Schedule>  metoprolol succinate  milliGRAM(s) Oral daily  pantoprazole    Tablet 40 milliGRAM(s) Oral every 12 hours  sucralfate suspension 1 Gram(s) Oral two times a day    MEDICATIONS  (PRN):  acetaminophen   Tablet .. 650 milliGRAM(s) Oral every 6 hours PRN Mild Pain (1 - 3)  ondansetron Injectable 4 milliGRAM(s) IV Push four times a day PRN Nausea and/or Vomiting  oxycodone    5 mG/acetaminophen 325 mG 1 Tablet(s) Oral every 6 hours PRN Moderate Pain (4 - 6)      Allergies  No Known Allergies      PAST MEDICAL & SURGICAL HISTORY:  History of ITP  12/2019 s/p oral steroid tx with normalization of platelet count  Atrial fibrillation  Obstructive sleep apnea  CPAP  History of ventricular fibrillation  last ICD shock 8/2019  S/P ICD (internal cardiac defibrillator) procedure  SQ ICD s/p 4 revisions last 9/2019      Vital Signs Last 24 Hrs  T(C): 36.6 (26 Oct 2020 08:43), Max: 36.6 (26 Oct 2020 08:43)  T(F): 97.9 (26 Oct 2020 08:43), Max: 97.9 (26 Oct 2020 08:43)  HR: 77 (27 Oct 2020 06:34) (63 - 98)  BP: 114/69 (27 Oct 2020 06:34) (105/60 - 121/69)  BP(mean): 86 (26 Oct 2020 08:39) (86 - 86)  RR: 16 (27 Oct 2020 06:34) (16 - 20)  SpO2: 99% (27 Oct 2020 06:34) (97% - 100%)    Physical Exam:  Constitutional: NAD, AAOx3  Cardiovascular: +S1S2 RRR  Pulmonary: CTA b/l, unlabored  Abd: soft NTND +BS  Groins: C/D/I bilaterally; no bleeding, hematoma, edema  Extremities: no pedal edema, +distal pulses b/l  Neuro: non focal, MARTINS x4    LABS:                        12.6   9.88  )-----------( 104      ( 27 Oct 2020 05:30 )             37.8     10-27    140  |  103  |  9.0  ----------------------------<  115<H>  4.0   |  25.0  |  0.72    Ca    8.3<L>      27 Oct 2020 05:30  Mg     2.2     10-27      PT/INR - ( 26 Oct 2020 08:41 )   PT: 12.1 sec;   INR: 1.05 ratio         PTT - ( 26 Oct 2020 08:41 )  PTT:30.4 sec      Assessment:    30 year old male with PMH of ITP (off steroids, followed by Dr. Lesvia Ye), MICHELLE on CPAP, idiopathic VF s/p S-ICD (original 2013-[facility], but required multiple revisions for premature battery failure and lead fracture most recently in 9/2019) s/p prior moderator band ablation (for PVC induced VF 3/2020-[facility]),  RAA AT ablation 3/2020 and Quinidine therapy (discontinued 8/2020), symptomatic paroxysmal AF (CHADsVASC 0) causing inappropriate ICD therapy in the past, and possible ECTOR thrombus (NEVAEH 3/2020) maintained on eliquis who presented on 10/26/2020 for and is now POD #1 s/p elective NEVAEH, atrial fibrillation (WACA, right elma line) & right atrial appendage AT ablation (partial suppression achieved).     Plan:   Access site care and activity limitations reviewed w/ pt.   Outpt f/up in 2-4 weeks.   Continue metoprolol 100mg po daily   Continue other home medications.   Start Protonix 40mg po bid x 4 weeks and then decrease to daily x 45 days.   Carafate 1gm BID x 2 week.   Lasix  20mg po daily x 2 days       Pt doing well POD #1 s/p NEVAEH, atrial fibrillation (WACA, right elma line) & right atrial appendage AT ablation (partial suppression achieved) via b/l FV (figure 8 sutures). Pt seen and examined this morning, denies complaints. Sutures removed without incident. Morning labs and telemetry reviewed. Postoperatively patient with frequent polymorphic PVCs which have decreased in frequency. Pt reports feeling well. Denies any Cp, palp or SOB.     Prelim intraop NEVAEH as per Dr Barkley: preserved LVFx, negative for ECTOR thrombus official pending)    EKG:  TELE: Sinus with occasional PVCs. 6 beat NCT @ 21:06 and episodes of sinus bradycardia    MEDICATIONS  (STANDING):  apixaban 5 milliGRAM(s) Oral <User Schedule>  furosemide    Tablet 20 milliGRAM(s) Oral daily  furosemide   Injectable 20 milliGRAM(s) IV Push <User Schedule>  metoprolol succinate  milliGRAM(s) Oral daily  pantoprazole    Tablet 40 milliGRAM(s) Oral every 12 hours  sucralfate suspension 1 Gram(s) Oral two times a day    MEDICATIONS  (PRN):  acetaminophen   Tablet .. 650 milliGRAM(s) Oral every 6 hours PRN Mild Pain (1 - 3)  ondansetron Injectable 4 milliGRAM(s) IV Push four times a day PRN Nausea and/or Vomiting  oxycodone    5 mG/acetaminophen 325 mG 1 Tablet(s) Oral every 6 hours PRN Moderate Pain (4 - 6)      Allergies  No Known Allergies      PAST MEDICAL & SURGICAL HISTORY:  History of ITP  12/2019 s/p oral steroid tx with normalization of platelet count  Atrial fibrillation  Obstructive sleep apnea  CPAP  History of ventricular fibrillation  last ICD shock 8/2019  S/P ICD (internal cardiac defibrillator) procedure  SQ ICD s/p 4 revisions last 9/2019      Vital Signs Last 24 Hrs  T(C): 36.6 (26 Oct 2020 08:43), Max: 36.6 (26 Oct 2020 08:43)  T(F): 97.9 (26 Oct 2020 08:43), Max: 97.9 (26 Oct 2020 08:43)  HR: 77 (27 Oct 2020 06:34) (63 - 98)  BP: 114/69 (27 Oct 2020 06:34) (105/60 - 121/69)  BP(mean): 86 (26 Oct 2020 08:39) (86 - 86)  RR: 16 (27 Oct 2020 06:34) (16 - 20)  SpO2: 99% (27 Oct 2020 06:34) (97% - 100%)    Physical Exam:  Constitutional: NAD, AAOx3  Cardiovascular: +S1S2 RRR  Pulmonary: CTA b/l, unlabored  Abd: soft NTND +BS  Groins: C/D/I bilaterally; no bleeding, hematoma, edema  Extremities: no pedal edema, +distal pulses b/l  Neuro: non focal, MARTINS x4    LABS:                        12.6   9.88  )-----------( 104      ( 27 Oct 2020 05:30 )             37.8     10-27    140  |  103  |  9.0  ----------------------------<  115<H>  4.0   |  25.0  |  0.72    Ca    8.3<L>      27 Oct 2020 05:30  Mg     2.2     10-27      PT/INR - ( 26 Oct 2020 08:41 )   PT: 12.1 sec;   INR: 1.05 ratio         PTT - ( 26 Oct 2020 08:41 )  PTT:30.4 sec      Assessment:    30 year old male with PMH of ITP (off steroids, followed by Dr. Lesvia Ye), MICHELLE on CPAP, idiopathic VF s/p S-ICD (original 2013-[facility], but required multiple revisions for premature battery failure and lead fracture most recently in 9/2019) s/p prior moderator band ablation (for PVC induced VF 3/2020-[facility]),  RAA AT ablation 3/2020 and Quinidine therapy (discontinued 8/2020), symptomatic paroxysmal AF (CHADsVASC 0) causing inappropriate ICD therapy in the past, and possible ECTOR thrombus (NEVAEH 3/2020) maintained on eliquis who presented on 10/26/2020 for and is now POD #1 s/p elective NEVAEH, atrial fibrillation (WACA, right elma line) & right atrial appendage AT ablation (partial suppression achieved).     Plan:   Access site care and activity limitations reviewed w/ pt.   Outpt f/up in 2-4 weeks.   Continue metoprolol 100mg po daily   Continue other home medications.   Start Protonix 40mg po bid x 4 weeks and then decrease to daily x 45 days.   Carafate 1gm BID x 2 week.   Lasix  20mg po daily x 2 days       Pt doing well POD #1 s/p NEVAEH, atrial fibrillation (WACA, right elma line) & right atrial appendage AT ablation (partial suppression achieved) via b/l FV (figure 8 sutures). Pt seen and examined this morning, denies complaints. Sutures removed without incident. Morning labs and telemetry reviewed. Postoperatively patient with frequent polymorphic PVCs which have decreased in frequency. Pt reports feeling well. Denies any Cp, palp or SOB.     Prelim intraop NEVAEH as per Dr Barkley: preserved LVFx, negative for ECTOR thrombus official pending)    EKG: Sinus @ 74 with APCs, RBBB  TELE: Sinus with occasional PVCs. 6 beat NCT @ 21:06 and episodes of sinus bradycardia    MEDICATIONS  (STANDING):  apixaban 5 milliGRAM(s) Oral <User Schedule>  furosemide    Tablet 20 milliGRAM(s) Oral daily  furosemide   Injectable 20 milliGRAM(s) IV Push <User Schedule>  metoprolol succinate  milliGRAM(s) Oral daily  pantoprazole    Tablet 40 milliGRAM(s) Oral every 12 hours  sucralfate suspension 1 Gram(s) Oral two times a day    MEDICATIONS  (PRN):  acetaminophen   Tablet .. 650 milliGRAM(s) Oral every 6 hours PRN Mild Pain (1 - 3)  ondansetron Injectable 4 milliGRAM(s) IV Push four times a day PRN Nausea and/or Vomiting  oxycodone    5 mG/acetaminophen 325 mG 1 Tablet(s) Oral every 6 hours PRN Moderate Pain (4 - 6)      Allergies  No Known Allergies      PAST MEDICAL & SURGICAL HISTORY:  History of ITP  12/2019 s/p oral steroid tx with normalization of platelet count  Atrial fibrillation  Obstructive sleep apnea  CPAP  History of ventricular fibrillation  last ICD shock 8/2019  S/P ICD (internal cardiac defibrillator) procedure  SQ ICD s/p 4 revisions last 9/2019      Vital Signs Last 24 Hrs  T(C): 36.6 (26 Oct 2020 08:43), Max: 36.6 (26 Oct 2020 08:43)  T(F): 97.9 (26 Oct 2020 08:43), Max: 97.9 (26 Oct 2020 08:43)  HR: 77 (27 Oct 2020 06:34) (63 - 98)  BP: 114/69 (27 Oct 2020 06:34) (105/60 - 121/69)  BP(mean): 86 (26 Oct 2020 08:39) (86 - 86)  RR: 16 (27 Oct 2020 06:34) (16 - 20)  SpO2: 99% (27 Oct 2020 06:34) (97% - 100%)    Physical Exam:  Constitutional: NAD, AAOx3  Cardiovascular: +S1S2 RRR  Pulmonary: CTA b/l, unlabored  Abd: soft NTND +BS  Groins: C/D/I bilaterally; no bleeding, hematoma, edema  Extremities: no pedal edema, +distal pulses b/l  Neuro: non focal, MARTINS x4    LABS:                        12.6   9.88  )-----------( 104      ( 27 Oct 2020 05:30 )             37.8     10-27    140  |  103  |  9.0  ----------------------------<  115<H>  4.0   |  25.0  |  0.72    Ca    8.3<L>      27 Oct 2020 05:30  Mg     2.2     10-27      PT/INR - ( 26 Oct 2020 08:41 )   PT: 12.1 sec;   INR: 1.05 ratio         PTT - ( 26 Oct 2020 08:41 )  PTT:30.4 sec      Assessment:    30 year old male with PMH of ITP (off steroids, followed by Dr. Lesvia Ye), MICHELLE on CPAP, idiopathic VF s/p S-ICD (original 2013-[facility], but required multiple revisions for premature battery failure and lead fracture most recently in 9/2019) s/p prior moderator band ablation (for PVC induced VF 3/2020-[facility]),  RAA AT ablation 3/2020 and Quinidine therapy (discontinued 8/2020), symptomatic paroxysmal AF (CHADsVASC 0) causing inappropriate ICD therapy in the past, and possible ECTOR thrombus (NEVAEH 3/2020) maintained on eliquis who presented on 10/26/2020 for and is now POD #1 s/p elective NEVAEH, atrial fibrillation (WACA, right elma line) & right atrial appendage AT ablation (partial suppression achieved).     Plan:   Access site care and activity limitations reviewed w/ pt.   Outpt f/up in 2-4 weeks.   Continue metoprolol 100mg po daily   Continue other home medications.   Start Protonix 40mg po bid x 4 weeks and then decrease to daily x 45 days.   Carafate 1gm BID x 2 week.   Lasix  20mg po daily x 2 days       Pt doing well POD #1 s/p NEVAEH, atrial fibrillation (WACA, right elma line) & right atrial appendage AT ablation (partial suppression achieved) via b/l FV (figure 8 sutures). Pt seen and examined this morning, denies complaints. Sutures removed without incident. Morning labs and telemetry reviewed. Postoperatively patient with frequent polymorphic PVCs which have decreased in frequency. Pt reports feeling well. Denies any Cp, palp or SOB.     Prelim intraop NEVAEH as per Dr Barkley: preserved LVFx, negative for ECTOR thrombus official pending)    EKG: Sinus @ 74 with APCs, RBBB  TELE: Sinus with occasional PVCs. 6 beat NCT @ 21:06 and episodes of sinus bradycardia    MEDICATIONS  (STANDING):  apixaban 5 milliGRAM(s) Oral <User Schedule>  furosemide    Tablet 20 milliGRAM(s) Oral daily  furosemide   Injectable 20 milliGRAM(s) IV Push <User Schedule>  metoprolol succinate  milliGRAM(s) Oral daily  pantoprazole    Tablet 40 milliGRAM(s) Oral every 12 hours  sucralfate suspension 1 Gram(s) Oral two times a day    MEDICATIONS  (PRN):  acetaminophen   Tablet .. 650 milliGRAM(s) Oral every 6 hours PRN Mild Pain (1 - 3)  ondansetron Injectable 4 milliGRAM(s) IV Push four times a day PRN Nausea and/or Vomiting  oxycodone    5 mG/acetaminophen 325 mG 1 Tablet(s) Oral every 6 hours PRN Moderate Pain (4 - 6)      Allergies  No Known Allergies      PAST MEDICAL & SURGICAL HISTORY:  History of ITP  12/2019 s/p oral steroid tx with normalization of platelet count  Atrial fibrillation  Obstructive sleep apnea  CPAP  History of ventricular fibrillation  last ICD shock 8/2019  S/P ICD (internal cardiac defibrillator) procedure  SQ ICD s/p 4 revisions last 9/2019      Vital Signs Last 24 Hrs  T(C): 36.6 (26 Oct 2020 08:43), Max: 36.6 (26 Oct 2020 08:43)  T(F): 97.9 (26 Oct 2020 08:43), Max: 97.9 (26 Oct 2020 08:43)  HR: 77 (27 Oct 2020 06:34) (63 - 98)  BP: 114/69 (27 Oct 2020 06:34) (105/60 - 121/69)  BP(mean): 86 (26 Oct 2020 08:39) (86 - 86)  RR: 16 (27 Oct 2020 06:34) (16 - 20)  SpO2: 99% (27 Oct 2020 06:34) (97% - 100%)    Physical Exam:  Constitutional: NAD, AAOx3  Cardiovascular: +S1S2 RRR; Left axillary S-ICD site is WNL  Pulmonary: CTA b/l, unlabored  Abd: soft NTND +BS  Groins: C/D/I bilaterally; no bleeding, hematoma, edema  Extremities: no pedal edema, +distal pulses b/l  Neuro: non focal, MARTINS x4    LABS:                        12.6   9.88  )-----------( 104      ( 27 Oct 2020 05:30 )             37.8     10-27    140  |  103  |  9.0  ----------------------------<  115<H>  4.0   |  25.0  |  0.72    Ca    8.3<L>      27 Oct 2020 05:30  Mg     2.2     10-27      PT/INR - ( 26 Oct 2020 08:41 )   PT: 12.1 sec;   INR: 1.05 ratio         PTT - ( 26 Oct 2020 08:41 )  PTT:30.4 sec      Assessment:    30 year old male with PMH of ITP (off steroids, followed by Dr. Lesvia Ye), MICHELLE on CPAP, idiopathic VF s/p S-ICD (original 2013-[facility], but required multiple revisions for premature battery failure and lead fracture most recently in 9/2019) s/p prior moderator band ablation (for PVC induced VF 3/2020-[facility]),  RAA AT ablation 3/2020 and Quinidine therapy (discontinued 8/2020), symptomatic paroxysmal AF (CHADsVASC 0) causing inappropriate ICD therapy in the past, and possible ECTOR thrombus (NEVAEH 3/2020) maintained on eliquis who presented on 10/26/2020 for and is now POD #1 s/p elective NEVAEH, atrial fibrillation (WACA, right elma line) & right atrial appendage AT ablation (partial suppression achieved).     Plan:   Access site care and activity limitations reviewed w/ pt.   Outpt f/up in 2-4 weeks.   Continue metoprolol 100mg po daily   Continue other home medications.   Start Protonix 40mg po bid x 4 weeks and then decrease to daily x 45 days.   Carafate 1gm BID x 2 week.   Lasix  20mg po daily x 2 days

## 2020-11-17 ENCOUNTER — NON-APPOINTMENT (OUTPATIENT)
Age: 30
End: 2020-11-17

## 2020-11-17 ENCOUNTER — APPOINTMENT (OUTPATIENT)
Dept: ELECTROPHYSIOLOGY | Facility: CLINIC | Age: 30
End: 2020-11-17
Payer: COMMERCIAL

## 2020-11-17 VITALS
WEIGHT: 248 LBS | HEIGHT: 69 IN | TEMPERATURE: 97.2 F | BODY MASS INDEX: 36.73 KG/M2 | HEART RATE: 99 BPM | DIASTOLIC BLOOD PRESSURE: 70 MMHG | OXYGEN SATURATION: 98 % | SYSTOLIC BLOOD PRESSURE: 118 MMHG

## 2020-11-17 DIAGNOSIS — Z86.79 PERSONAL HISTORY OF OTHER DISEASES OF THE CIRCULATORY SYSTEM: ICD-10-CM

## 2020-11-17 PROCEDURE — 93000 ELECTROCARDIOGRAM COMPLETE: CPT | Mod: 59

## 2020-11-17 PROCEDURE — 99214 OFFICE O/P EST MOD 30 MIN: CPT

## 2020-11-17 PROCEDURE — 93261 INTERROGATE SUBQ DEFIB: CPT

## 2020-11-17 PROCEDURE — 99072 ADDL SUPL MATRL&STAF TM PHE: CPT

## 2020-11-17 NOTE — PHYSICAL EXAM
[General Appearance - Well Developed] : well developed [Normal Appearance] : normal appearance [General Appearance - Well Nourished] : well nourished [General Appearance - In No Acute Distress] : no acute distress [Respiration, Rhythm And Depth] : normal respiratory rhythm and effort [Exaggerated Use Of Accessory Muscles For Inspiration] : no accessory muscle use [Auscultation Breath Sounds / Voice Sounds] : lungs were clear to auscultation bilaterally [Heart Rate And Rhythm] : heart rate and rhythm were normal [Heart Sounds] : normal S1 and S2 [Murmurs] : no murmurs present [Edema] : no peripheral edema present [Bowel Sounds] : normal bowel sounds [Abnormal Walk] : normal gait [Nail Clubbing] : no clubbing of the fingernails [Skin Color & Pigmentation] : normal skin color and pigmentation [] : no rash [Oriented To Time, Place, And Person] : oriented to person, place, and time [No Anxiety] : not feeling anxious

## 2020-11-18 NOTE — REVIEW OF SYSTEMS
[Palpitations] : palpitations [Dizziness] : dizziness [Negative] : Heme/Lymph [Fever] : no fever [Chills] : no chills [Feeling Fatigued] : not feeling fatigued [Shortness Of Breath] : no shortness of breath [Dyspnea on exertion] : not dyspnea during exertion [Chest Pain] : no chest pain [Lower Ext Edema] : no extremity edema [Cough] : no cough [Wheezing] : no wheezing [Skin: A Rash] : no rash: [Confusion] : no confusion was observed [Easy Bleeding] : no tendency for easy bleeding [Easy Bruising] : no tendency for easy bruising

## 2020-11-18 NOTE — END OF VISIT
[FreeTextEntry3] : I have personally seen, examined, and participated in the care of this patient. I have reviewed all pertinent clinical information, including history, physical exam, plan, and the PA/NP's note and agree except as noted below.\par \par Doing well after AF ablation. S-ICD interrogation shows 1 episode of WCT and possible AF, unclear of how it was precipitated. Will restart patient on Quinidine 200mg BID. He will remain on anticoagulation and Metoprolol 100mg BID to complete 3 months. Afterwards, will discontinue anticoagulation and keep Metoprolol.\par \par Aydin Barkley MD, FACC, FHRS\par Clinical Cardiac Electrophysiology

## 2020-11-18 NOTE — HISTORY OF PRESENT ILLNESS
[FreeTextEntry1] : 30 year old male with PMH of ITP (off steroids, followed by Dr. Lesvia Ye), MICHELLE on CPAP, idiopathic VF s/p S-ICD (original 2013-[place], but required multiple revisions for premature battery failure and lead fracture most recently in 9/2019) s/p prior moderator band ablation (for PVC induced VF 3/2020-[facility]), RAA AT ablation 3/2020, possible ECTOR thrombus (NEVAEH 3/2020) maintained on eliquis and Quinidine therapy (discontinued 8/2020), symptomatic paroxysmal AF (CHADsVASC 0) causing inappropriate ICD therapy in the past now s/p elective NEVAEH,atrial fibrillation (WACA, right elma line) & right atrial appendage AT ablation (partial suppression achieved).\par \par Presents for post ablation follow-up. Last week, had an episode of rapid palpitations and lightheadedness. SICD interrogation shows episode of possible PAF with RVR with burst of MMVT @ ~200bpm, NO HV therapy delivered.  Otherwise doing well and asymptomatic. No additional AF noted.

## 2020-11-18 NOTE — DISCUSSION/SUMMARY
[FreeTextEntry1] : 30 year old male with PMH of ITP (off steroids, followed by Dr. 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 Last week, had an episode of rapid palpitations and lightheadedness. SICD interrogation shows episode of possible PAF with RVR with burst of nonsustained MMVT @ ~200bpm, NO HV therapy delivered.  Otherwise doing well and asymptomatic. No additional AF noted.\par \par - Restart Quinidine 200 mg BID for episode of symptomatic NSVT.\par - Continue Metoprolol 100mg daily\par - Continue Eliquis for at least 3 months post ablation\par - 2 week MCOT at 3 months post ablation.\par \par EP f/up after above\par Seen and d/w Dr. Barkley\par Caitlin Salazar PAC\par

## 2020-11-18 NOTE — HISTORY OF PRESENT ILLNESS
[FreeTextEntry1] : 30 year old male with PMH of ITP (off steroids, followed by Dr. 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 Last week, had an episode of rapid palpitations and lightheadedness. SICD interrogation shows episode of possible PAF with RVR with burst of MMVT @ ~200bpm, NO HV therapy delivered.  Otherwise doing well and asymptomatic. No additional AF noted.

## 2020-11-18 NOTE — DISCUSSION/SUMMARY
[FreeTextEntry1] : 30 year old male with PMH of ITP (off steroids, followed by Dr. 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 Last week, had an episode of rapid palpitations and lightheadedness. SICD interrogation shows episode of possible PAF with RVR with burst of nonsustained MMVT @ ~200bpm, NO HV therapy delivered.  Otherwise doing well and asymptomatic. No additional AF noted.\par \par - Restart Quinidine 200 mg BID for episode of symptomatic NSVT.\par - Continue Metoprolol 100mg daily\par - Continue Eliquis for at least 3 months post ablation\par - 2 week MCOT at 3 months post ablation.\par \par EP f/up after above\par Seen and d/w Dr. Barkley\par Caitlin Salazar PAC\par

## 2021-01-11 ENCOUNTER — APPOINTMENT (OUTPATIENT)
Dept: ELECTROPHYSIOLOGY | Facility: CLINIC | Age: 31
End: 2021-01-11
Payer: COMMERCIAL

## 2021-01-11 PROCEDURE — 93296 REM INTERROG EVL PM/IDS: CPT

## 2021-01-11 PROCEDURE — 93295 DEV INTERROG REMOTE 1/2/MLT: CPT

## 2021-02-23 ENCOUNTER — APPOINTMENT (OUTPATIENT)
Dept: ELECTROPHYSIOLOGY | Facility: CLINIC | Age: 31
End: 2021-02-23
Payer: COMMERCIAL

## 2021-02-23 ENCOUNTER — NON-APPOINTMENT (OUTPATIENT)
Age: 31
End: 2021-02-23

## 2021-02-23 VITALS
BODY MASS INDEX: 37.77 KG/M2 | WEIGHT: 255 LBS | OXYGEN SATURATION: 96 % | HEART RATE: 113 BPM | SYSTOLIC BLOOD PRESSURE: 124 MMHG | HEIGHT: 69 IN | TEMPERATURE: 98.7 F | RESPIRATION RATE: 16 BRPM | DIASTOLIC BLOOD PRESSURE: 79 MMHG

## 2021-02-23 PROCEDURE — 93000 ELECTROCARDIOGRAM COMPLETE: CPT | Mod: 59

## 2021-02-23 PROCEDURE — 99072 ADDL SUPL MATRL&STAF TM PHE: CPT

## 2021-02-23 PROCEDURE — 93261 INTERROGATE SUBQ DEFIB: CPT | Mod: 59

## 2021-02-23 PROCEDURE — 99214 OFFICE O/P EST MOD 30 MIN: CPT | Mod: 25

## 2021-02-23 RX ORDER — PANTOPRAZOLE SODIUM 40 MG/10ML
40 INJECTION, POWDER, FOR SOLUTION INTRAVENOUS
Refills: 0 | Status: DISCONTINUED | COMMUNITY
End: 2021-02-23

## 2021-02-23 NOTE — DISCUSSION/SUMMARY
[FreeTextEntry1] : 30 year old male with PMH of ITP (off steroids, followed by Dr. Lesvia Ye), MICHELLE on CPAP, idiopathic VF s/p S-ICD (original 2013-Black/Carisa, but required multiple revisions for premature battery failure and lead fracture most recently in 9/2019) s/p prior moderator band ablation (for PVC induced VF 3/2020-Filippo), RAA AT ablation 3/2020, possible ECTOR thrombus (NEVAEH 3/2020) maintained on eliquis and Quinidine therapy (discontinued 8/2020), symptomatic paroxysmal AF (CHADsVASC 0) causing inappropriate ICD therapy in the past now s/p elective NEVAEH,atrial fibrillation (WACA, right elma line) & right atrial appendage AT ablation (partial suppression achieved).\par \par During post ablation follow-up he reported an episode of rapid palpitations and lightheadedness. SICD interrogation showed episode of possible PAF with RVR with burst of MMVT @ ~200bpm, NO HV therapy delivered. Quinidine was resumed. \par \par Since last visit he reports he has been feeling well. Experiences palpitations at times but has not had any dizziness, near syncope, syncope. SQ-ICD interrogation reveals no ventricular episodes. AF burden 6%. Electrode on advisory. Assessment reveals no noise, impedance status OK. Remote monitoring in place.\par \par Recommendation: \par - We discussed Mendez SCI electrode advisory, interrogation today WNL. Remote monitoring in place, to return for in person interrogations every 3 months. Auditory alert demonstrated. \par - Risks/benefits of long term ac discussed with patient. He has very active job in construction, and CHADS2-VASc 0, therefore may discontinue Eliquis. \par -Recent non-compliance with medications, we discussed compliance strategies, and he verbalized understanding that missing medications can lead to ICD shock. He expressed willingness to adhere to medical regimen. \par -To return for EP follow up in 3 months.\par \par Maryan MUNOZ

## 2021-02-23 NOTE — HISTORY OF PRESENT ILLNESS
[FreeTextEntry1] : 30 year old male with PMH of ITP (off steroids, followed by Dr. Lesvia Ye), MICHELLE on CPAP, idiopathic VF s/p S-ICD (original 2013-Black/Carisa, but required multiple revisions for premature battery failure and lead fracture most recently in 9/2019) s/p prior moderator band ablation (for PVC induced VF 3/2020-Filippo), RAA AT ablation 3/2020, possible ECTOR thrombus (NEVAEH 3/2020) maintained on eliquis and Quinidine therapy (discontinued 8/2020), symptomatic paroxysmal AF (CHADsVASC 0) causing inappropriate ICD therapy in the past now s/p elective NEVAEH,atrial fibrillation (WACA, right elma line) & right atrial appendage AT ablation (partial suppression achieved).\par \par During post ablation follow-up he reported an episode of rapid palpitations and lightheadedness. SICD interrogation showed episode of possible PAF with RVR with burst of MMVT @ ~200bpm, NO HV therapy delivered. Quinidine was resumed. \par \par Since last visit he reports he has been feeling well. Experiences palpitations at times but has not had any dizziness, near syncope, syncope. SQ-ICD interrogation reveals no ventricular episodes. AF burden 6%. Electrode on advisory. Assessment reveals no noise, impedance status OK. Remote monitoring in place. \par

## 2021-02-23 NOTE — REVIEW OF SYSTEMS
[Palpitations] : palpitations [Headache] : no headache [Feeling Fatigued] : not feeling fatigued [Shortness Of Breath] : no shortness of breath [Dyspnea on exertion] : not dyspnea during exertion [Chest  Pressure] : no chest pressure [Chest Pain] : no chest pain [Lower Ext Edema] : no extremity edema [Dizziness] : no dizziness [Easy Bleeding] : no tendency for easy bleeding [Easy Bruising] : no tendency for easy bruising

## 2021-02-23 NOTE — PHYSICAL EXAM
[General Appearance - Well Developed] : well developed [General Appearance - Well Nourished] : well nourished [] : no respiratory distress [Heart Rate And Rhythm] : heart rate and rhythm were normal [Heart Sounds] : normal S1 and S2 [Murmurs] : no murmurs present [Arterial Pulses Normal] : the arterial pulses were normal [Edema] : no peripheral edema present [Abnormal Walk] : normal gait [Oriented To Time, Place, And Person] : oriented to person, place, and time

## 2021-02-23 NOTE — END OF VISIT
[FreeTextEntry3] : I have personally seen, examined, and participated in the care of this patient. I have reviewed all pertinent clinical information, including history, physical exam, plan, and the PA/NP's note and agree except as noted below.\par \par Doing well. Low AF burden. CHADS2-VASc of 0 so will stop anticoagulation. Continue Quinidine for idiopathic VF. Discussed S-ICD advisory.\par \par Aydin Barkley MD, FACC, FHRS\par Clinical Cardiac Electrophysiology

## 2021-04-12 ENCOUNTER — APPOINTMENT (OUTPATIENT)
Dept: ELECTROPHYSIOLOGY | Facility: CLINIC | Age: 31
End: 2021-04-12

## 2021-04-12 ENCOUNTER — RX RENEWAL (OUTPATIENT)
Age: 31
End: 2021-04-12

## 2021-04-14 ENCOUNTER — FORM ENCOUNTER (OUTPATIENT)
Age: 31
End: 2021-04-14

## 2021-04-22 ENCOUNTER — APPOINTMENT (OUTPATIENT)
Dept: ELECTROPHYSIOLOGY | Facility: CLINIC | Age: 31
End: 2021-04-22
Payer: COMMERCIAL

## 2021-04-22 ENCOUNTER — NON-APPOINTMENT (OUTPATIENT)
Age: 31
End: 2021-04-22

## 2021-04-22 PROCEDURE — 93296 REM INTERROG EVL PM/IDS: CPT

## 2021-04-22 PROCEDURE — 93295 DEV INTERROG REMOTE 1/2/MLT: CPT

## 2021-05-25 ENCOUNTER — APPOINTMENT (OUTPATIENT)
Dept: ELECTROPHYSIOLOGY | Facility: CLINIC | Age: 31
End: 2021-05-25
Payer: SELF-PAY

## 2021-05-25 VITALS
TEMPERATURE: 98.1 F | WEIGHT: 242 LBS | DIASTOLIC BLOOD PRESSURE: 68 MMHG | SYSTOLIC BLOOD PRESSURE: 118 MMHG | BODY MASS INDEX: 35.84 KG/M2 | HEART RATE: 85 BPM | HEIGHT: 69 IN | OXYGEN SATURATION: 97 %

## 2021-05-25 DIAGNOSIS — Z45.02 ENCOUNTER FOR ADJUSTMENT AND MANAGEMENT OF AUTOMATIC IMPLANTABLE CARDIAC DEFIBRILLATOR: ICD-10-CM

## 2021-05-25 PROCEDURE — 99072 ADDL SUPL MATRL&STAF TM PHE: CPT

## 2021-05-25 PROCEDURE — 93261 INTERROGATE SUBQ DEFIB: CPT

## 2021-05-25 PROCEDURE — 99214 OFFICE O/P EST MOD 30 MIN: CPT

## 2021-05-25 NOTE — HISTORY OF PRESENT ILLNESS
[de-identified] : 30 year old male with PMH of ITP (off steroids, followed by Dr. Lesvia Ye), MICHELLE on CPAP, idiopathic VF s/p S-ICD (original 2013-[place]/Carisa, but required multiple revisions for premature battery failure and lead fracture most recently in 9/2019) s/p prior moderator band ablation (for PVC induced VF 3/2020-Filippo), RAA AT ablation 3/2020, possible ECTOR thrombus (NEVAEH 3/2020) maintained on eliquis and Quinidine therapy (discontinued 8/2020), symptomatic paroxysmal AF (CHADsVASC 0) causing inappropriate ICD therapy in the past now s/p elective NEVAEH,atrial fibrillation (WACA, right elma line) & right atrial appendage AT ablation (partial suppression achieved).\par \par During post ablation follow-up he reported an episode of rapid palpitations and lightheadedness. SICD interrogation showed episode of possible PAF with RVR with burst of MMVT @ ~200bpm, NO HV therapy delivered. Quinidine was resumed. 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 \par \par Zio patch worn 2/27/21-3/13/21 revealed NSVT, frequent SVT with the fastest interval lasting 5 seconds, longest 15.5 seconds in duration with avg rate of 100 bpm, cannot rule out AF (difficult to evaluate P waves). \par \par Since last follow up he reports he has been feeling well. Stopped his metoprolol and Quinidine approximately 3 weeks ago as he forgot, but notes that he now believe he has had symptomatic improvement while off slightly more energy, less palpitations. \par \par S-ICD interrogation in office today reveals normal function. Electrode on advisory. Assessment reveals no noise, impedance status OK. Remote monitoring in place. No ventricular episodes since last follow up. AF burden 7% although some EGMs consistent with SR with ectopy.

## 2021-05-25 NOTE — DISCUSSION/SUMMARY
[FreeTextEntry1] : 30 year old male with PMH of ITP (off steroids, followed by Dr. Lesvia Ye), MICHELLE on CPAP, idiopathic VF s/p S-ICD (original 2013-Black/Carisa, but required multiple revisions for premature battery failure and lead fracture most recently in 9/2019) s/p prior moderator band ablation (for PVC induced VF 3/2020-Filippo), RAA AT ablation 3/2020, possible ECTOR thrombus (NEVAEH 3/2020) maintained on eliquis and Quinidine therapy (discontinued 8/2020), symptomatic paroxysmal AF (CHADsVASC 0) causing inappropriate ICD therapy in the past now s/p elective NEVAEH,atrial fibrillation (WACA, right elma line) & right atrial appendage AT ablation (partial suppression achieved).\par \par During post ablation follow-up he reported an episode of rapid palpitations and lightheadedness. SICD interrogation showed episode of possible PAF with RVR with burst of MMVT @ ~200bpm, NO HV therapy delivered. Quinidine was resumed. 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 \par \par Zio patch worn 2/27/21-3/13/21 revealed NSVT, frequent SVT with the fastest interval lasting 5 seconds, longest 15.5 seconds in duration with avg rate of 100 bpm, cannot rule out AF (difficult to evaluate P waves). \par \par Since last follow up he reports he has been feeling well. Stopped his metoprolol and Quinidine approximately 3 weeks ago as he forgot, but notes that he now believe he has had symptomatic improvement while off slightly more energy, less palpitations. \par \par S-ICD interrogation in office today reveals normal function. Electrode on advisory. Assessment reveals no noise, impedance status OK. Remote monitoring in place. No ventricular episodes since last follow up. AF burden 7% although some EGMs consistent with SR with ectopy. \par \par Recommendation: \par \par -We discussed need for medication compliance to reduce risk of PAF with RVR and future inappropriate ICD therapy. He has not noted significant symptomatic change off medications and willing to resume. We discussed changing metoprolol to diltiazem but due to low burden of possible fatigue from mediation he wishes to continue same medications at this time.\par -To return for device follow up in 3 months, EP follow up in 6 months.\par \par Maryan Bernstein ANP-C

## 2021-05-25 NOTE — REVIEW OF SYSTEMS
[Palpitations] : palpitations [Headache] : no headache [Feeling Fatigued] : not feeling fatigued [SOB] : no shortness of breath [Dyspnea on exertion] : not dyspnea during exertion [Chest Discomfort] : no chest discomfort [Lower Ext Edema] : no extremity edema [Dizziness] : no dizziness

## 2021-07-29 ENCOUNTER — NON-APPOINTMENT (OUTPATIENT)
Age: 31
End: 2021-07-29

## 2021-07-29 ENCOUNTER — APPOINTMENT (OUTPATIENT)
Dept: ELECTROPHYSIOLOGY | Facility: CLINIC | Age: 31
End: 2021-07-29
Payer: COMMERCIAL

## 2021-07-29 PROCEDURE — 93296 REM INTERROG EVL PM/IDS: CPT

## 2021-07-29 PROCEDURE — 93295 DEV INTERROG REMOTE 1/2/MLT: CPT

## 2021-08-17 ENCOUNTER — APPOINTMENT (OUTPATIENT)
Dept: ELECTROPHYSIOLOGY | Facility: CLINIC | Age: 31
End: 2021-08-17
Payer: COMMERCIAL

## 2021-08-17 ENCOUNTER — NON-APPOINTMENT (OUTPATIENT)
Age: 31
End: 2021-08-17

## 2021-08-17 VITALS
OXYGEN SATURATION: 97 % | DIASTOLIC BLOOD PRESSURE: 63 MMHG | HEART RATE: 63 BPM | HEIGHT: 69 IN | SYSTOLIC BLOOD PRESSURE: 100 MMHG | BODY MASS INDEX: 35.55 KG/M2 | WEIGHT: 240 LBS | TEMPERATURE: 97.9 F

## 2021-08-17 PROCEDURE — 93261 INTERROGATE SUBQ DEFIB: CPT

## 2021-08-17 NOTE — HISTORY OF PRESENT ILLNESS
[de-identified] : 31 year old male with PMH of ITP (off steroids, followed by Dr. Lesvia Ye), MICHELLE on CPAP, idiopathic VF s/p S-ICD (original 2013-Wayne/Carisa, but required multiple revisions for premature battery failure and lead fracture most recently in 9/2019) s/p prior moderator band ablation (for PVC induced VF 3/2020-Filippo), RAA AT ablation 3/2020, possible ECTOR thrombus (NEVAEH 3/2020) maintained on eliquis and Quinidine therapy (discontinued 8/2020), symptomatic paroxysmal AF (CHADsVASC 0) causing inappropriate ICD therapy in the past now s/p elective NEVAEH,atrial fibrillation (WACA, right elma line) & right atrial appendage AT ablation (partial suppression achieved).\par \par During post ablation follow-up he reported an episode of rapid palpitations and lightheadedness. SICD interrogation showed episode of possible PAF with RVR with burst of MMVT @ ~200bpm, NO HV therapy delivered. Quinidine was resumed. 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 \par \par Zio patch worn 2/27/21-3/13/21 revealed NSVT, frequent SVT with the fastest interval lasting 5 seconds, longest 15.5 seconds in duration with avg rate of 100 bpm, cannot rule out AF (difficult to evaluate P waves). \par \par Since last follow up he reports he has been feeling well. Stopped his metoprolol and Quinidine approximately 3 weeks ago as he forgot, but notes that he now believe he has had symptomatic improvement while off slightly more energy, less palpitations. \par \par S-ICD interrogation in office today reveals normal function. Electrode on advisory.\par \par INCOMPLETE NOTE NOT YET SEEN 
[id number] Comprehensive

## 2021-11-05 ENCOUNTER — NON-APPOINTMENT (OUTPATIENT)
Age: 31
End: 2021-11-05

## 2021-11-05 ENCOUNTER — APPOINTMENT (OUTPATIENT)
Dept: ELECTROPHYSIOLOGY | Facility: CLINIC | Age: 31
End: 2021-11-05
Payer: COMMERCIAL

## 2021-11-05 PROCEDURE — 93295 DEV INTERROG REMOTE 1/2/MLT: CPT | Mod: NC

## 2021-11-05 PROCEDURE — 93296 REM INTERROG EVL PM/IDS: CPT | Mod: NC

## 2021-12-07 ENCOUNTER — APPOINTMENT (OUTPATIENT)
Dept: ELECTROPHYSIOLOGY | Facility: CLINIC | Age: 31
End: 2021-12-07
Payer: COMMERCIAL

## 2021-12-07 ENCOUNTER — RX RENEWAL (OUTPATIENT)
Age: 31
End: 2021-12-07

## 2021-12-07 ENCOUNTER — NON-APPOINTMENT (OUTPATIENT)
Age: 31
End: 2021-12-07

## 2021-12-07 VITALS
BODY MASS INDEX: 34.96 KG/M2 | SYSTOLIC BLOOD PRESSURE: 120 MMHG | WEIGHT: 236 LBS | DIASTOLIC BLOOD PRESSURE: 75 MMHG | HEART RATE: 66 BPM | TEMPERATURE: 98.5 F | OXYGEN SATURATION: 98 % | HEIGHT: 69 IN

## 2021-12-07 DIAGNOSIS — Z95.810 PRESENCE OF AUTOMATIC (IMPLANTABLE) CARDIAC DEFIBRILLATOR: ICD-10-CM

## 2021-12-07 DIAGNOSIS — D69.6 THROMBOCYTOPENIA, UNSPECIFIED: ICD-10-CM

## 2021-12-07 DIAGNOSIS — D69.3 IMMUNE THROMBOCYTOPENIC PURPURA: ICD-10-CM

## 2021-12-07 PROCEDURE — 99215 OFFICE O/P EST HI 40 MIN: CPT

## 2021-12-11 ENCOUNTER — RESULT CHARGE (OUTPATIENT)
Age: 31
End: 2021-12-11

## 2021-12-12 PROBLEM — D69.3 ACUTE ITP: Status: RESOLVED | Noted: 2021-12-12 | Resolved: 2021-12-12

## 2021-12-12 PROBLEM — D69.6 THROMBOCYTOPENIA: Noted: 2019-12-04

## 2021-12-12 NOTE — DISCUSSION/SUMMARY
[FreeTextEntry1] : JESS CRAMER is a 31 year old male with idiopathic thrombocytopenic purpura (off steroids), MICHELLE on CPAP, idiopathic VF s/p S-ICD (2013, gen change 9/2019) & moderator band RFA (3/2020) on chronic Quinidine, and paroxysmal AF (s/p PVI 10/2020) who presents for follow up.\par \par He is doing well from an arrhythmia standpoint, though it is unclear what his true AF burden. Given his young age, I would try to aim for as low (ideally no AF) burden as possible. It is difficult to assess true AF burden with S-ICD so I recommended we pursue ILR implantation. All risks, benefits, alternatives and costs were discussed with the patient, he agrees to proceed.\par \par In regards to his S-ICD, it is functioning well without evidence of lead compromise. 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 I will discuss with Dr. Ramos to see if there is any additional testing (possibly evaluation of his family) to further identify whether this VUS could be causing disease.\par \par Recommendations:\par - ILR implant\par - Continue Quinidine 200mg BID\par - Continue Metoprolol Succinate 100mg daily\par - D/w Dr. Ramos\par \par RTC in 3 months.\par \par Aydin Barkley MD, FACC, RS\par Clinical Cardiac Electrophysiology

## 2021-12-12 NOTE — HISTORY OF PRESENT ILLNESS
[FreeTextEntry1] : JESS CRAMER is a 31 year old male with idiopathic thrombocytopenic purpura (off steroids), MICHELLE on CPAP, idiopathic VF s/p S-ICD (2013, gen change 9/2019) & moderator band RFA (3/2020) on chronic Quinidine, and paroxysmal AF (s/p PVI 10/2020) who presents for follow up.\par \par To summarize his history, he initially had an S-ICD implanted in 2013 by Dr. Briones at [facility] for idiopathic VF. He underwent genetic testing which was negative for any inheritable syndromes. His S-ICD had premature battery depletion in 2015 requiring generator replacement. He had a pocket revision in 1/2016 due to posterior device migration. In 9/2019 he was found to have lead noise resulting in inappropriate ICD shocks. He had an evaluation was noted to have a lead fracture so underwent system extraction and reimplantation. He has not had any ICD difficulties since that times\par \par He has had appropriate ICD shocks since initial implantation. In 3/2020 he presented with VF storm requiring multiple shocks. He was noted on ECG to have what appeared to be idiopathic PVC induced VF with LBLS axis, V5-V6 transition suggestive of RV source. He underwent ablation on 3/2020. NEVAEH showed possible ECTOR thrombus, though patient was in NSR throughout. During procedure he had occasional PVCs. Best PASO pace maps were obtained near the moderator band, though were poor at ~80%. Ablation was performed along the moderator band, which eventually resulted in a RBBB. During this procedure he had frequent APCs at the base of the RAA which were ablated with partial suppression. Following ablation, he was treated with Quinidine which was later discontinued in 8/25/2020.\par \par Additionally, he was diagnosed with AF in 2018, which resulted in RVR resulting in an inappropriate shock. He was managed conservatively initially and eventually underwent PVI on 10/2020. At the time, additional ablation was again performed at the base of the RAA.\par \par Following ablation in the blanking period on 11/16/2020, he had an episode of AF with RVR as well as a monomorphic wide complex tachycardia which self-terminated, but was associated with dizziness/lightheadedness. He was therefore restarted on Quinidine 200mg BID which he initially was inconsistent in taking, but has since been tolerant.\par \par Of note, he was diagnosed with ITP for which he was treated with steroids and appears to be in remission.\par \par Since his last ablation, he has done well with no recurrent ICD shocks. His device has continued to report low burden AF but he has not had any tachycardia episodes (as he did previously). He has remained off of Apixaban given CHADS2-VASc of 0.\par \par He continues to feel well and has no significant complaints. S-ICD interrogation in office today reveals normal function. Electrode on advisory. Assessment reveals no noise, impedance status OK. Remote monitoring in place. No ventricular episodes since last follow up.

## 2021-12-12 NOTE — CARDIOLOGY SUMMARY
[de-identified] : \par 12/07/2021: NSR with RBBB, PVC and APCs.\par  [de-identified] : \par 02/27-03/13/21 14 Day Extended Holter: NSVT, frequent SVT with the fastest interval lasting 5 seconds, longest 15.5 seconds in duration with avg rate of 100 bpm, cannot rule out AF (difficult to evaluate P waves)\par  [de-identified] : \par 10/26/2020 NEVAEH: LVEF: 55-60%. No LA/ECTOR thrombus. RA normal. RV normal. No significant valvular dysfunction.\par  [de-identified] : \par 12/2013 Cardiac MRI: No LGE. Normal biventricular function.\par  [de-identified] : \par 09/20/2019: [facility] S-ICD Explant & Reimplant for lead fracture.\par 01/12/2016: Pocket revision of [facility] S-ICD generator.\par 03/252015: BS S-ICD premature battery depletion, generator replacement.\par 12/032013: BS S-ICD Implant\par \par Interrogation 12/12/2021: Normal lead parameters. No noise on any vectors. Low burden AF.\par  [de-identified] : \par 10/26/2020: RF PVI with right elma line. RF AT at base of RAA.\par 03/02/2020: Moderator band RFA. RF base of RAA APC.\par  [de-identified] : \par Genetic Testing:\par Variants of Unknown Significance Seen in:\par 1) ANK2 gene (p.Dfy1170Loy ([id number]) (CGG>TGG): c.10702 C>T in exon 39 of the ANK2 gene (NM_001148.4)\par is suggested to cause a deleterious effect on in-silico analysis)\par 2) SCN5A gene (p.[id number] ([id number]) (CGG>TGG): c.10702 C>T in exon 39 of the ANK2 gene (NM_001148.4)\par is suggested to cause a deleterious effect on in-silico analysis)\par

## 2021-12-12 NOTE — PHYSICAL EXAM
[Well Developed] : well developed [Well Nourished] : well nourished [No Acute Distress] : no acute distress [No Respiratory Distress] : no respiratory distress  [Normal Gait] : normal gait [Moves all extremities] : moves all extremities [No Focal Deficits] : no focal deficits [Alert and Oriented] : alert and oriented

## 2022-01-05 ENCOUNTER — RX CHANGE (OUTPATIENT)
Age: 32
End: 2022-01-05

## 2022-01-10 ENCOUNTER — RX CHANGE (OUTPATIENT)
Age: 32
End: 2022-01-10

## 2022-02-04 ENCOUNTER — TRANSCRIPTION ENCOUNTER (OUTPATIENT)
Age: 32
End: 2022-02-04

## 2022-02-04 ENCOUNTER — OUTPATIENT (OUTPATIENT)
Dept: OUTPATIENT SERVICES | Facility: HOSPITAL | Age: 32
LOS: 1 days | End: 2022-02-04
Payer: COMMERCIAL

## 2022-02-04 VITALS
OXYGEN SATURATION: 98 % | SYSTOLIC BLOOD PRESSURE: 96 MMHG | HEART RATE: 77 BPM | RESPIRATION RATE: 16 BRPM | TEMPERATURE: 98 F | HEIGHT: 69 IN | WEIGHT: 220.02 LBS | DIASTOLIC BLOOD PRESSURE: 59 MMHG

## 2022-02-04 DIAGNOSIS — Z95.810 PRESENCE OF AUTOMATIC (IMPLANTABLE) CARDIAC DEFIBRILLATOR: Chronic | ICD-10-CM

## 2022-02-04 DIAGNOSIS — I48.0 PAROXYSMAL ATRIAL FIBRILLATION: ICD-10-CM

## 2022-02-04 PROCEDURE — C1764: CPT

## 2022-02-04 PROCEDURE — 33285 INSJ SUBQ CAR RHYTHM MNTR: CPT

## 2022-02-04 RX ORDER — CEPHALEXIN 500 MG
500 CAPSULE ORAL ONCE
Refills: 0 | Status: COMPLETED | OUTPATIENT
Start: 2022-02-04 | End: 2022-02-04

## 2022-02-04 RX ORDER — QUINIDINE SULFATE 200 MG
0 TABLET ORAL
Qty: 0 | Refills: 0 | DISCHARGE

## 2022-02-04 RX ADMIN — Medication 500 MILLIGRAM(S): at 07:45

## 2022-02-04 NOTE — DISCHARGE NOTE PROVIDER - CARE PROVIDER_API CALL
Aydin Barkley)  Cardiac Electrophysiology; Cardiology  [address] [place] [address], [address]  [place] [address]  Phone: [phone]  Fax: [phone]  Follow Up Time:

## 2022-02-04 NOTE — DISCHARGE NOTE PROVIDER - NSDCMRMEDTOKEN_GEN_ALL_CORE_FT
metoprolol succinate 100 mg oral tablet, extended release: 1 tab(s) orally 2 times a day  quiNIDine 200 mg oral tablet: orally 2 times a day

## 2022-02-04 NOTE — DISCHARGE NOTE NURSING/CASE MANAGEMENT/SOCIAL WORK - PATIENT PORTAL LINK FT
You can access the FollowMyHealth Patient Portal offered by [facility] by registering at the following website: [email or web address]. By joining [facility]’s FollowMyHealth portal, you will also be able to view your health information using other applications (apps) compatible with our system.

## 2022-02-04 NOTE — H&P PST ADULT - ATTENDING COMMENTS
All risks, benefits and alternatives discussed with patient. He agrees to proceed.    Aydin Barkley MD  Clinical Cardiac Electrophysiology

## 2022-02-04 NOTE — H&P PST ADULT - ASSESSMENT
31 year old male with PMH of ITP, MICHELLE on CPAP, idiopathic VF s/p S-ICD (2013/Dr. Black/[place] [facility], gen change 2015), lead fracture s/p system extraction and reimplantation 9/2019, PVC, moderator band RFA 3/2020, and paroxysmal AF s/p PVI ablation 10/2020). He is doing well from an arrhythmia standpoint, although it is unclear what his true AF burden is. It is difficult to assess true AF burden with S-ICD so he presents today for elective ILR implant for long term arrhythmia surveillance.

## 2022-02-04 NOTE — DISCHARGE NOTE PROVIDER - HOSPITAL COURSE
31 year old male with PMH of ITP, MICHELLE on CPAP, idiopathic VF s/p S-ICD (2013/Dr. Black/[place]. [place], gen change 2015), lead fracture s/p system extraction and reimplantation 9/2019, PVC, moderator band RFA 3/2020, and paroxysmal AF s/p PVI ablation 10/2020). He is doing well from an arrhythmia standpoint, although it is unclear what his true AF burden is. It is difficult to assess true AF burden with S-ICD. He presented electively and is now status post uncomplicated ILR implant for long term arrhythmia surveillance. The patient was observed per post procedure protocol, then discharged home with a plan for outpatient follow up.

## 2022-02-04 NOTE — DISCHARGE NOTE PROVIDER - NSDCCPTREATMENT_GEN_ALL_CORE_FT
PRINCIPAL PROCEDURE  Procedure: Insertion, loop recorder  Findings and Treatment: Loop Recorder Incision Care:     - Do not touch the incision until it is completely healed.   - There is Dermabond (skin glue) on your incision, which will start to flake off on its own over the next 2-3 weeks. Do not pick at or peel off the Dermabond.   - Do not apply soaps, creams, lotions, ointments or powders to the incision until it is completely healed.  - You should call the doctor if you notice redness, drainage, swelling, increased tenderness, hot sensation around the  incision, bleeding or incision edges pulling apart.         PRINCIPAL PROCEDURE  Procedure: Insertion, loop recorder  Findings and Treatment: Loop Recorder Incision Care:     - Do not touch the incision until it is completely healed.   - There is Dermabond or Steristrips on your incision, which will start to flake off on its own over the next 2-3 weeks. Do not pick at or peel off the Dermabond/Steristrips.    - Do not apply soaps, creams, lotions, ointments or powders to the incision until it is completely healed.  - You should call the doctor if you notice redness, drainage, swelling, increased tenderness, hot sensation around the  incision, bleeding or incision edges pulling apart.

## 2022-02-04 NOTE — DISCHARGE NOTE PROVIDER - NSDCFUADDINST_GEN_ALL_CORE_FT
Follow up with Dr. Barkley in 2-3 weeks. Our office will contact you in 3-5 days to schedule this appointment. Please call [phone] with questions or concerns.

## 2022-02-04 NOTE — H&P PST ADULT - HISTORY OF PRESENT ILLNESS
Street
31 year old male with PMH of ITP, MICHELLE on CPAP, idiopathic VF s/p S-ICD (2013/Dr. Black/[place] [place], gen change 2015), lead fracture s/p system extraction and reimplantation 9/2019, PVC, moderator band RFA 3/2020, and paroxysmal AF s/p PVI ablation 10/2020). He is doing well from an arrhythmia standpoint, although it is unclear what his true AF burden is. It is difficult to assess true AF burden with S-ICD so he presents today for elective ILR implant for long term arrhythmia surveillance.

## 2022-02-04 NOTE — DISCHARGE NOTE NURSING/CASE MANAGEMENT/SOCIAL WORK - NSDCPEFALRISK_GEN_ALL_CORE
For information on Fall & Injury Prevention, visit: [email or web address] OR  [email or web address] OR  https://www.cdc.gov/steadi/patient.html

## 2022-02-07 ENCOUNTER — NON-APPOINTMENT (OUTPATIENT)
Age: 32
End: 2022-02-07

## 2022-02-07 ENCOUNTER — APPOINTMENT (OUTPATIENT)
Dept: ELECTROPHYSIOLOGY | Facility: CLINIC | Age: 32
End: 2022-02-07
Payer: COMMERCIAL

## 2022-02-07 PROCEDURE — 93296 REM INTERROG EVL PM/IDS: CPT

## 2022-02-07 PROCEDURE — 93295 DEV INTERROG REMOTE 1/2/MLT: CPT

## 2022-02-21 ENCOUNTER — RESULT CHARGE (OUTPATIENT)
Age: 32
End: 2022-02-21

## 2022-02-22 ENCOUNTER — APPOINTMENT (OUTPATIENT)
Dept: ELECTROPHYSIOLOGY | Facility: CLINIC | Age: 32
End: 2022-02-22
Payer: COMMERCIAL

## 2022-02-22 VITALS
RESPIRATION RATE: 14 BRPM | SYSTOLIC BLOOD PRESSURE: 120 MMHG | WEIGHT: 221 LBS | HEIGHT: 69 IN | DIASTOLIC BLOOD PRESSURE: 76 MMHG | TEMPERATURE: 98.4 F | OXYGEN SATURATION: 98 % | HEART RATE: 66 BPM | BODY MASS INDEX: 32.73 KG/M2

## 2022-02-22 DIAGNOSIS — R00.2 PALPITATIONS: ICD-10-CM

## 2022-02-22 PROCEDURE — 93000 ELECTROCARDIOGRAM COMPLETE: CPT

## 2022-02-22 PROCEDURE — 99215 OFFICE O/P EST HI 40 MIN: CPT

## 2022-02-27 PROBLEM — R00.2 PALPITATIONS: Status: ACTIVE | Noted: 2019-11-05

## 2022-02-28 NOTE — END OF VISIT
[Time Spent: ___ minutes] : I have spent [unfilled] minutes of time on the encounter. [FreeTextEntry3] : I have personally seen, examined, and participated in the care of this patient. I have reviewed all pertinent clinical information, including history, physical exam, plan, and the PA/NP's note and agree except as noted below.\par \par Dio continues to have high AF burden (~10%) despite AF ablation. Given his young age and inability to use alternative AADs (already on Quinidine for idiopathic VF), I recommended we pursue repeat ablation. He previously had an RA trigger, near the base of the RA appendage that I think may be the cause of his arrhythmia. We reviewed risks, benefits and alternatives. He agrees to proceed. We will get CTA to be done 1 week before ablation.\par \par Aydin Barkley MD, FACC, RS\par Clinical Cardiac Electrophysiology

## 2022-02-28 NOTE — REVIEW OF SYSTEMS
[Palpitations] : palpitations [Fever] : no fever [Chills] : no chills [Feeling Fatigued] : not feeling fatigued [SOB] : no shortness of breath [Dyspnea on exertion] : not dyspnea during exertion [Chest Discomfort] : no chest discomfort [Leg Claudication] : no intermittent leg claudication [Orthopnea] : no orthopnea [Syncope] : no syncope [Cough] : no cough [Dizziness] : no dizziness [Easy Bleeding] : no tendency for easy bleeding

## 2022-02-28 NOTE — HISTORY OF PRESENT ILLNESS
[de-identified] : 31 year old male with PMH of ITP (off steroids, followed by Dr. 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 His S-ICD did not provide clear diagnostics about ongoing AF burden so he underwent ILR implant for long term arrhythmia surveillance (2/4/22).\par \par ILR now notable for frequent episodes of intermittent PAF with periodic RVR but overall controlled V rates.  Overall burden ~10%, and episodes occur several times a week lasting up to 8 hours in duration. \par \par He continues to report intermittent palpitations which are mild to moderate. Denies dizziness, syncope,fatigue, CP or SOB.  Remains on Quindine and Metoprolol.  [id number]–VASc 0 and off a/c \par \par

## 2022-02-28 NOTE — PROCEDURE
[No] : not [NSR] : normal sinus rhythm [See Device Printout] : See device printout [None] : none [de-identified] : [employer] LINQ 2  [de-identified] : PAF with periodic RVR but overall controlled V rate, overall burden ~ 10%

## 2022-02-28 NOTE — PHYSICAL EXAM
[General Appearance - Well Developed] : well developed [General Appearance - Well Nourished] : well nourished [Clean] : clean [Dry] : dry [Well-Healed] : well-healed [FreeTextEntry1] : parasternal

## 2022-02-28 NOTE — DISCUSSION/SUMMARY
[FreeTextEntry1] : 31 year old male with PMH of ITP (off steroids, followed by Dr. 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 His S-ICD did not provide clear diagnostics about ongoing AF burden so he underwent ILR implant for long term arrhythmia surveillance (2/4/22).\par \par ILR now notable for frequent episodes of intermittent PAF with periodic RVR but overall controlled V rates.  Overall burden ~10%, and episodes occur several times a week lasting up to 8 hours in duration. \par \par He continues to report intermittent palpitations which are mild to moderate. Denies dizziness, syncope,fatigue, CP or SOB.  Remains on Quindine and Metoprolol.  [id number]–VASc 0 and off a/c \par \par - ILR shows 10% burden of moderately symptomatic PAF with overall controlled V rates. \par - Given relatively young age, would aim to minimize AF burden, in hopes of improving long term outcomes. Reviewed options for rhythm control including AAT vs AF ablation. Remains on Quinidine.  Additional AAT options are limited by young age and suspected channelopathy.  \par - After extension discussion, patient would like to proceed with repeat AF ablation.\par - Will start a/c with Eliquis approx 1 month prior to planned procedure.  Hold AM dose of Eliquis on day of procedure.\par - CT heart prior to ablation to assess for ECTOR thrombus. \par \par Seen and discussed with Dr. Barkley\par Caitlin Salazar PAC\par

## 2022-03-08 ENCOUNTER — APPOINTMENT (OUTPATIENT)
Dept: ELECTROPHYSIOLOGY | Facility: CLINIC | Age: 32
End: 2022-03-08

## 2022-03-14 ENCOUNTER — APPOINTMENT (OUTPATIENT)
Dept: ELECTROPHYSIOLOGY | Facility: CLINIC | Age: 32
End: 2022-03-14
Payer: COMMERCIAL

## 2022-03-14 ENCOUNTER — NON-APPOINTMENT (OUTPATIENT)
Age: 32
End: 2022-03-14

## 2022-03-14 PROCEDURE — 93298 REM INTERROG DEV EVAL SCRMS: CPT

## 2022-03-14 PROCEDURE — G2066: CPT

## 2022-04-18 ENCOUNTER — NON-APPOINTMENT (OUTPATIENT)
Age: 32
End: 2022-04-18

## 2022-04-18 ENCOUNTER — APPOINTMENT (OUTPATIENT)
Dept: ELECTROPHYSIOLOGY | Facility: CLINIC | Age: 32
End: 2022-04-18
Payer: COMMERCIAL

## 2022-04-18 PROCEDURE — G2066: CPT

## 2022-04-18 PROCEDURE — 93298 REM INTERROG DEV EVAL SCRMS: CPT

## 2022-05-09 ENCOUNTER — APPOINTMENT (OUTPATIENT)
Dept: ELECTROPHYSIOLOGY | Facility: CLINIC | Age: 32
End: 2022-05-09

## 2022-05-17 ENCOUNTER — RX CHANGE (OUTPATIENT)
Age: 32
End: 2022-05-17

## 2022-05-20 ENCOUNTER — RX CHANGE (OUTPATIENT)
Age: 32
End: 2022-05-20

## 2022-05-23 ENCOUNTER — NON-APPOINTMENT (OUTPATIENT)
Age: 32
End: 2022-05-23

## 2022-05-23 ENCOUNTER — RX CHANGE (OUTPATIENT)
Age: 32
End: 2022-05-23

## 2022-05-23 ENCOUNTER — APPOINTMENT (OUTPATIENT)
Dept: ELECTROPHYSIOLOGY | Facility: CLINIC | Age: 32
End: 2022-05-23
Payer: COMMERCIAL

## 2022-05-23 PROCEDURE — G2066: CPT

## 2022-05-23 PROCEDURE — 93298 REM INTERROG DEV EVAL SCRMS: CPT

## 2022-06-06 ENCOUNTER — APPOINTMENT (OUTPATIENT)
Age: 32
End: 2022-06-06
Payer: COMMERCIAL

## 2022-06-06 ENCOUNTER — NON-APPOINTMENT (OUTPATIENT)
Age: 32
End: 2022-06-06

## 2022-06-06 PROCEDURE — 93296 REM INTERROG EVL PM/IDS: CPT

## 2022-06-06 PROCEDURE — 93295 DEV INTERROG REMOTE 1/2/MLT: CPT

## 2022-06-07 ENCOUNTER — NON-APPOINTMENT (OUTPATIENT)
Age: 32
End: 2022-06-07

## 2022-06-19 NOTE — DISCHARGE NOTE PROVIDER - NSDCFUADDINST_GEN_ALL_CORE_FT
Please schedule follow  up with [facility] [phone] this week for evaluation of thrombocytopenia.
no

## 2022-06-24 NOTE — PHYSICAL EXAM
[General Appearance - Well Developed] : well developed [General Appearance - Well Nourished] : well nourished [Heart Rate And Rhythm] : heart rate and rhythm were normal [Heart Sounds] : normal S1 and S2 [Murmurs] : no murmurs present [Arterial Pulses Normal] : the arterial pulses were normal [Edema] : no peripheral edema present [] : no respiratory distress Crescentic Advancement Flap Text: The defect edges were debeveled with a #15 scalpel blade.  Given the location of the defect and the proximity to free margins a crescentic advancement flap was deemed most appropriate.  Using a sterile surgical marker, the appropriate advancement flap was drawn incorporating the defect and placing the expected incisions within the relaxed skin tension lines where possible.    The area thus outlined was incised deep to adipose tissue with a #15 scalpel blade.  The skin margins were undermined to an appropriate distance in all directions utilizing iris scissors.

## 2022-06-27 ENCOUNTER — APPOINTMENT (OUTPATIENT)
Dept: ELECTROPHYSIOLOGY | Facility: CLINIC | Age: 32
End: 2022-06-27

## 2022-06-27 ENCOUNTER — NON-APPOINTMENT (OUTPATIENT)
Age: 32
End: 2022-06-27

## 2022-06-27 PROCEDURE — 93298 REM INTERROG DEV EVAL SCRMS: CPT

## 2022-06-27 PROCEDURE — G2066: CPT

## 2022-08-01 ENCOUNTER — APPOINTMENT (OUTPATIENT)
Dept: ELECTROPHYSIOLOGY | Facility: CLINIC | Age: 32
End: 2022-08-01

## 2022-08-01 ENCOUNTER — NON-APPOINTMENT (OUTPATIENT)
Age: 32
End: 2022-08-01

## 2022-08-01 PROCEDURE — 93298 REM INTERROG DEV EVAL SCRMS: CPT

## 2022-08-01 PROCEDURE — G2066: CPT

## 2022-08-02 NOTE — DISCHARGE NOTE NURSING/CASE MANAGEMENT/SOCIAL WORK - NSDCPEELIQUISFU_GEN_ALL_CORE
Transitional Planning    Spoke with admissions at [facility], they are able to accept. Transport requested. Spoke with Jair Easton, they will pick patient up at [facility], admissions, . Updated daughter, Mitch Blevins. Jazmin [phone] with Du[facility] Mcgill- she is agreeable to transfer time. # for Report [phone]           Discharge [id number] [facility]  Clinical Case Management Department  Written by:  Sandhya Javier RN    Patient Name: [facility]  Attending Provider: Shona Chavira DO  Admit Date: 2022  9:19 PM  MRN: [id number]  Account: [de-identified]                     : 1935  Discharge Date: 22      Disposition: SNF    Sandhya Javier RN Go for blood tests as directed. Your doctor will do lab tests at regular visits to monitor the effects of this medicine. Please follow up with your doctor and keep your health care provider appointments.

## 2022-08-08 ENCOUNTER — APPOINTMENT (OUTPATIENT)
Dept: ELECTROPHYSIOLOGY | Facility: CLINIC | Age: 32
End: 2022-08-08

## 2022-09-06 ENCOUNTER — APPOINTMENT (OUTPATIENT)
Dept: ELECTROPHYSIOLOGY | Facility: CLINIC | Age: 32
End: 2022-09-06

## 2022-09-06 ENCOUNTER — NON-APPOINTMENT (OUTPATIENT)
Age: 32
End: 2022-09-06

## 2022-09-06 PROCEDURE — 93295 DEV INTERROG REMOTE 1/2/MLT: CPT

## 2022-09-06 PROCEDURE — 93296 REM INTERROG EVL PM/IDS: CPT

## 2022-09-19 ENCOUNTER — RX RENEWAL (OUTPATIENT)
Age: 32
End: 2022-09-19

## 2022-10-10 ENCOUNTER — NON-APPOINTMENT (OUTPATIENT)
Age: 32
End: 2022-10-10

## 2022-10-10 ENCOUNTER — APPOINTMENT (OUTPATIENT)
Dept: ELECTROPHYSIOLOGY | Facility: CLINIC | Age: 32
End: 2022-10-10

## 2022-10-10 PROCEDURE — 93298 REM INTERROG DEV EVAL SCRMS: CPT

## 2022-10-10 PROCEDURE — G2066: CPT

## 2022-11-14 ENCOUNTER — APPOINTMENT (OUTPATIENT)
Dept: ELECTROPHYSIOLOGY | Facility: CLINIC | Age: 32
End: 2022-11-14

## 2022-11-14 ENCOUNTER — NON-APPOINTMENT (OUTPATIENT)
Age: 32
End: 2022-11-14

## 2022-11-14 PROCEDURE — 93298 REM INTERROG DEV EVAL SCRMS: CPT

## 2022-11-14 PROCEDURE — G2066: CPT

## 2022-12-06 ENCOUNTER — APPOINTMENT (OUTPATIENT)
Dept: ELECTROPHYSIOLOGY | Facility: CLINIC | Age: 32
End: 2022-12-06

## 2022-12-07 ENCOUNTER — FORM ENCOUNTER (OUTPATIENT)
Age: 32
End: 2022-12-07

## 2022-12-20 ENCOUNTER — NON-APPOINTMENT (OUTPATIENT)
Age: 32
End: 2022-12-20

## 2022-12-20 ENCOUNTER — APPOINTMENT (OUTPATIENT)
Dept: ELECTROPHYSIOLOGY | Facility: CLINIC | Age: 32
End: 2022-12-20

## 2022-12-20 PROCEDURE — 93295 DEV INTERROG REMOTE 1/2/MLT: CPT

## 2022-12-20 PROCEDURE — 93296 REM INTERROG EVL PM/IDS: CPT

## 2023-01-06 ENCOUNTER — APPOINTMENT (OUTPATIENT)
Dept: ELECTROPHYSIOLOGY | Facility: CLINIC | Age: 33
End: 2023-01-06
Payer: COMMERCIAL

## 2023-01-06 ENCOUNTER — NON-APPOINTMENT (OUTPATIENT)
Age: 33
End: 2023-01-06

## 2023-01-06 PROCEDURE — 93298 REM INTERROG DEV EVAL SCRMS: CPT

## 2023-01-06 PROCEDURE — G2066: CPT

## 2023-02-08 ENCOUNTER — APPOINTMENT (OUTPATIENT)
Dept: ELECTROPHYSIOLOGY | Facility: CLINIC | Age: 33
End: 2023-02-08
Payer: COMMERCIAL

## 2023-02-08 ENCOUNTER — NON-APPOINTMENT (OUTPATIENT)
Age: 33
End: 2023-02-08

## 2023-02-08 PROCEDURE — 93298 REM INTERROG DEV EVAL SCRMS: CPT

## 2023-02-08 PROCEDURE — G2066: CPT

## 2023-03-21 ENCOUNTER — APPOINTMENT (OUTPATIENT)
Dept: ELECTROPHYSIOLOGY | Facility: CLINIC | Age: 33
End: 2023-03-21
Payer: COMMERCIAL

## 2023-03-21 ENCOUNTER — NON-APPOINTMENT (OUTPATIENT)
Age: 33
End: 2023-03-21

## 2023-03-21 PROCEDURE — 93296 REM INTERROG EVL PM/IDS: CPT

## 2023-03-21 PROCEDURE — 93295 DEV INTERROG REMOTE 1/2/MLT: CPT

## 2023-04-07 ENCOUNTER — APPOINTMENT (OUTPATIENT)
Dept: ORTHOPEDIC SURGERY | Facility: CLINIC | Age: 33
End: 2023-04-07
Payer: COMMERCIAL

## 2023-04-07 VITALS
DIASTOLIC BLOOD PRESSURE: 72 MMHG | HEART RATE: 65 BPM | BODY MASS INDEX: 34.07 KG/M2 | WEIGHT: 230 LBS | HEIGHT: 69 IN | SYSTOLIC BLOOD PRESSURE: 105 MMHG

## 2023-04-07 DIAGNOSIS — M17.11 UNILATERAL PRIMARY OSTEOARTHRITIS, RIGHT KNEE: ICD-10-CM

## 2023-04-07 DIAGNOSIS — M25.561 PAIN IN RIGHT KNEE: ICD-10-CM

## 2023-04-07 PROCEDURE — 99204 OFFICE O/P NEW MOD 45 MIN: CPT

## 2023-04-07 PROCEDURE — 73564 X-RAY EXAM KNEE 4 OR MORE: CPT | Mod: RT

## 2023-04-07 NOTE — ADDENDUM
[FreeTextEntry1] : This note was authored by Mega Nur working as a medical scribe for Dr. Weston Kearns. The note was reviewed, edited, and revised by Dr. Weston Kearns whom is in agreement with the exam findings, imaging findings, and treatment plan. 04/07/2023

## 2023-04-07 NOTE — DISCUSSION/SUMMARY
[de-identified] : JESS CRAMER is a 32 year old male who presents with right knee mild-moderate patellofemoral compartment arthritis and prepatellar bursitis from frequent kneeling.  The patient cannot undergo an MRI as he has an implanted defibrillator.  A prescription for physical therapy was provided.  I recommended a course of Mobic.  The patient was given a prescription for the Mobic with directions.  They were instructed to stop the medicine and call the office if there are any adverse reaction to the medicine.  They were also instructed to consult with their primary care doctor prior to starting the medication.  We discussed hyaluronic acid injections and intraarticular cortisone injections should physical therapy fail to provide adequate relief. We also discussed sending him for a CT arthrogram to assess for osteochondral defects should his pain level not improve with treatment. He will follow up accordingly.

## 2023-04-07 NOTE — HISTORY OF PRESENT ILLNESS
[de-identified] : Patient is a 32-year-old male presenting for evaluation of longstanding right knee pain now worsening.  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  His pain is localized around and behind the kneecap.  He notes pain is worse with kneeling as well as with walking any distance and rising from seated position.  He has not had any injections thus far.  He has tried physical therapy without relief.  He has not had an MRI of the knee.  He admits to intermittent swelling and stiffness.\par Past oral history includes a cardiac defibrillator, currently on metoprolol, no anticoagulants.

## 2023-04-07 NOTE — PHYSICAL EXAM
[de-identified] : The patient appears well nourished  and in no apparent distress.  The patient is alert and oriented to person, place, and time.   Affect and mood appear normal. The head is normocephalic and atraumatic.  The eyes reveal normal sclera and extra ocular muscles are intact. The tongue is midline with no apparent lesions.  Skin shows normal turgor with no evidence of eczema or psoriasis.  No respiratory distress noted.  Sensation grossly intact.		  [de-identified] : Exam of the right knee shows 5 degrees of hyperextension. There is a moderate effusion. There is a prepatellar bursitis. There is no joint line tenderness. \par 5/5 motor strength bilaterally distally. Sensation intact distally.		  [de-identified] : X-ray: 4 views of the right knee demonstrate mild patellofemoral compartment joint space narrowing with osteophytes.

## 2023-04-07 NOTE — CONSULT LETTER
[Dear  ___] : Dear  [unfilled], [Consult Letter:] : I had the pleasure of evaluating your patient, [unfilled]. [Please see my note below.] : Please see my note below. [Consult Closing:] : Thank you very much for allowing me to participate in the care of this patient.  If you have any questions, please do not hesitate to contact me. [Sincerely,] : Sincerely, [FreeTextEntry3] : Weston Kearns MD\par Chief of Joint Replacement\par Primary & Revision Hip and Knee Replacement \par [facility]\par

## 2023-04-25 ENCOUNTER — APPOINTMENT (OUTPATIENT)
Dept: ELECTROPHYSIOLOGY | Facility: CLINIC | Age: 33
End: 2023-04-25
Payer: COMMERCIAL

## 2023-04-25 ENCOUNTER — NON-APPOINTMENT (OUTPATIENT)
Age: 33
End: 2023-04-25

## 2023-04-25 PROCEDURE — G2066: CPT

## 2023-04-25 PROCEDURE — 93298 REM INTERROG DEV EVAL SCRMS: CPT

## 2023-05-23 ENCOUNTER — APPOINTMENT (OUTPATIENT)
Dept: ELECTROPHYSIOLOGY | Facility: CLINIC | Age: 33
End: 2023-05-23
Payer: COMMERCIAL

## 2023-05-23 VITALS
HEIGHT: 69 IN | WEIGHT: 236 LBS | HEART RATE: 74 BPM | DIASTOLIC BLOOD PRESSURE: 68 MMHG | BODY MASS INDEX: 34.96 KG/M2 | OXYGEN SATURATION: 97 % | SYSTOLIC BLOOD PRESSURE: 100 MMHG

## 2023-05-23 DIAGNOSIS — I47.20 VENTRICULAR TACHYCARDIA, UNSPECIFIED: ICD-10-CM

## 2023-05-23 PROCEDURE — 93261 INTERROGATE SUBQ DEFIB: CPT

## 2023-05-23 PROCEDURE — 93000 ELECTROCARDIOGRAM COMPLETE: CPT | Mod: 59

## 2023-05-23 PROCEDURE — 99214 OFFICE O/P EST MOD 30 MIN: CPT | Mod: 25

## 2023-05-23 RX ORDER — MELOXICAM 7.5 MG/1
7.5 TABLET ORAL
Qty: 60 | Refills: 0 | Status: DISCONTINUED | COMMUNITY
Start: 2023-04-07 | End: 2023-05-23

## 2023-05-23 RX ORDER — APIXABAN 5 MG/1
5 TABLET, FILM COATED ORAL
Qty: 60 | Refills: 5 | Status: DISCONTINUED | COMMUNITY
Start: 2019-11-05 | End: 2023-05-23

## 2023-05-23 NOTE — PHYSICAL EXAM
[No Acute Distress] : no acute distress [No Respiratory Distress] : no respiratory distress  [No Edema] : no edema [Moves all extremities] : moves all extremities [Alert and Oriented] : alert and oriented

## 2023-05-25 NOTE — HISTORY OF PRESENT ILLNESS
[FreeTextEntry1] : 32 year old male with PMH of ITP (off steroids, followed by Dr. 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 Ablation discussed and scheduled, then cancelled. \par \par Since last follow up he reports he has been feeling well. Off Quinidine for > 1 year due to difficulty getting from pharmacy then was busy with work and did not follow up. S-ICD interrogation reveals normal function. No ventricular episodes since last interrogation.\par \par ILR has revealed no AF since April 2022. Working on weight loss, down 20 lbs compared to two years ago. Denies palpitations, dizziness, near syncope, syncope. ECG reveals SR, RBBB.

## 2023-05-25 NOTE — REVIEW OF SYSTEMS
[SOB] : no shortness of breath [Dyspnea on exertion] : not dyspnea during exertion [Chest Discomfort] : no chest discomfort [Palpitations] : no palpitations [Orthopnea] : no orthopnea [Syncope] : no syncope [Dizziness] : no dizziness [Easy Bleeding] : no tendency for easy bleeding

## 2023-05-25 NOTE — CARDIOLOGY SUMMARY
[de-identified] : \par 12/07/2021: NSR with RBBB, PVC and APCs.\par  [de-identified] : \par 02/27-03/13/21 14 Day Extended Holter: NSVT, frequent SVT with the fastest interval lasting 5 seconds, longest 15.5 seconds in duration with avg rate of 100 bpm, cannot rule out AF (difficult to evaluate P waves)\par  [de-identified] : \par 10/26/2020 NEVAEH: LVEF: 55-60%. No LA/ECTOR thrombus. RA normal. RV normal. No significant valvular dysfunction.\par  [de-identified] : \par 12/2013 Cardiac MRI: No LGE. Normal biventricular function.\par  [de-identified] : \par 09/20/2019: [facility] S-ICD Explant & Reimplant for lead fracture.\par 01/12/2016: [place]ocket revision of [facility] S-ICD generator.\par 03/252015: BS S-ICD premature battery depletion, generator replacement.\par 12/032013: BS S-ICD Implant\par \par Interrogation 12/12/2021: Normal lead parameters. No noise on any vectors. Low burden AF.\par  [de-identified] : \par 10/26/2020: RF PVI with right elma line. RF AT at base of RAA.\par 03/02/2020: Moderator band RFA. RF base of RAA APC.\par  [de-identified] : \par Genetic Testing:\par Variants of Unknown Significance Seen in:\par 1) ANK2 gene (p.[id number] (R8518M) (CGG>TGG): c.10702 C>T in exon 39 of the ANK2 gene (NM_001148.4)\par is suggested to cause a deleterious effect on in-silico analysis)\par 2) SCN5A gene (p.Uad6732Smt (M8682C) (CGG>TGG): c.10702 C>T in exon 39 of the ANK2 gene (NM_001148.4)\par is suggested to cause a deleterious effect on in-silico analysis)\par

## 2023-05-25 NOTE — DISCUSSION/SUMMARY
[EKG obtained to assist in diagnosis and management of assessed problem(s)] : EKG obtained to assist in diagnosis and management of assessed problem(s) [FreeTextEntry1] : 32 year old male with PMH of ITP (off steroids, followed by Dr. 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 Ablation discussed and scheduled, then cancelled. \par \par Since last follow up he reports he has been feeling well. Off Quinidine for > 1 year due to difficulty getting from pharmacy then was busy with work and did not follow up. S-ICD interrogation reveals normal function. No ventricular episodes since last interrogation.\par \par ILR has revealed no AF since April 2022. Working on weight loss, down 20 lbs compared to two years ago. Denies palpitations, dizziness, near syncope, syncope. ECG reveals SR, RBBB. \par \par Recommendation:\par \par -PAF now with no episodes since 2/2022. To continue with lifestyle modifications including weight loss and remote ILR monitoring. \par -History of appropriate ICD therapy for VF storm,  moderator band ablation for PVC induced VF. Quinidine initially discontinued post ablation then resumed after symptomatic WCT noted which self terminated. Now Off for > 1 year. Discussed risks of recurrent ICD therapy. Recommended keeping quinidine on hand to be initiated if recurrent arrhythmias noted in future. \par -Continue metoprolol 100mg daily. \par -EP follow up annually or sooner PRN.

## 2023-05-25 NOTE — END OF VISIT
[Time Spent: ___ minutes] : I have spent [unfilled] minutes of time on the encounter. [FreeTextEntry3] : I have personally seen, examined, and participated in the care of this patient. I have reviewed all pertinent clinical information, including history, physical exam, plan, and the PA/NP's note and agree except as noted below.\par \par Patient doing very well with almost no AF burden and no VF episodes. He has stopped taking Quinidine. I encouraged him to make sure he has some available in the event he has recurrent arrhythmias.\par \par Aydin Barkley MD, FACC, [facility]\par Clinical Cardiac Electrophysiology

## 2023-06-10 NOTE — ED ADULT NURSE NOTE - NSIMPLEMENTINTERV_GEN_ALL_ED
General Implemented All Universal Safety Interventions:  [place] to call system. Call bell, personal items and telephone within reach. Instruct patient to call for assistance. Room bathroom lighting operational. Non-slip footwear when patient is off stretcher. Physically safe environment: no spills, clutter or unnecessary equipment. Stretcher in lowest position, wheels locked, appropriate side rails in place.

## 2023-06-23 NOTE — ADDENDUM
[FreeTextEntry1] : I have personally seen, examined, and participated in the care of this patient. I have reviewed all pertinent clinical information, including history, physical exam, plan, and the PA/NP's note and agree except as noted below.\par \par Briefly, 29 year old male with h/o VF arrest s/p S-ICD with multiple issues over the past several years requiring 2 generator replacements (2015 premature battery depletion, 1/2016 posterior device migration, inappropriate shocks from lead noise) but also with appropriate ICD shocks who was noted to have multiple high impedance alerts on his device for which he presents today. Last week he had AF with RVR with precipitation of VT which resolved spontaneously.\par \par Discussed with BSc who recommended patient get 2vCXR to evaluate for possible lead/header issue. Will double Metoprolol to 200mg daily to reduce ventricular rates while in AF.\par \par Aydin Barkley MD\par Clinical Cardiac Electrophysiology 0

## 2023-07-25 ENCOUNTER — APPOINTMENT (OUTPATIENT)
Dept: ELECTROPHYSIOLOGY | Facility: CLINIC | Age: 33
End: 2023-07-25
Payer: COMMERCIAL

## 2023-07-25 ENCOUNTER — NON-APPOINTMENT (OUTPATIENT)
Age: 33
End: 2023-07-25

## 2023-07-25 PROCEDURE — G2066: CPT

## 2023-07-25 PROCEDURE — 93298 REM INTERROG DEV EVAL SCRMS: CPT

## 2023-08-22 ENCOUNTER — APPOINTMENT (OUTPATIENT)
Dept: ELECTROPHYSIOLOGY | Facility: CLINIC | Age: 33
End: 2023-08-22

## 2023-09-26 ENCOUNTER — APPOINTMENT (OUTPATIENT)
Dept: ELECTROPHYSIOLOGY | Facility: CLINIC | Age: 33
End: 2023-09-26
Payer: COMMERCIAL

## 2023-09-26 ENCOUNTER — NON-APPOINTMENT (OUTPATIENT)
Age: 33
End: 2023-09-26

## 2023-09-26 PROCEDURE — G2066: CPT

## 2023-09-26 PROCEDURE — 93298 REM INTERROG DEV EVAL SCRMS: CPT

## 2023-10-17 ENCOUNTER — APPOINTMENT (OUTPATIENT)
Dept: ELECTROPHYSIOLOGY | Facility: CLINIC | Age: 33
End: 2023-10-17

## 2023-10-30 ENCOUNTER — APPOINTMENT (OUTPATIENT)
Dept: ELECTROPHYSIOLOGY | Facility: CLINIC | Age: 33
End: 2023-10-30
Payer: COMMERCIAL

## 2023-10-30 ENCOUNTER — NON-APPOINTMENT (OUTPATIENT)
Age: 33
End: 2023-10-30

## 2023-10-31 PROCEDURE — 93296 REM INTERROG EVL PM/IDS: CPT

## 2023-10-31 PROCEDURE — 93295 DEV INTERROG REMOTE 1/2/MLT: CPT

## 2023-11-17 ENCOUNTER — NON-APPOINTMENT (OUTPATIENT)
Age: 33
End: 2023-11-17

## 2023-11-17 ENCOUNTER — APPOINTMENT (OUTPATIENT)
Dept: ELECTROPHYSIOLOGY | Facility: CLINIC | Age: 33
End: 2023-11-17
Payer: COMMERCIAL

## 2023-11-17 PROCEDURE — G2066: CPT

## 2023-11-17 PROCEDURE — 93298 REM INTERROG DEV EVAL SCRMS: CPT | Mod: NC

## 2023-12-20 RX ORDER — METOPROLOL SUCCINATE 100 MG/1
100 TABLET, EXTENDED RELEASE ORAL
Qty: 90 | Refills: 3 | Status: ACTIVE | COMMUNITY
Start: 2020-04-02 | End: 1900-01-01

## 2023-12-21 ENCOUNTER — APPOINTMENT (OUTPATIENT)
Dept: ELECTROPHYSIOLOGY | Facility: CLINIC | Age: 33
End: 2023-12-21
Payer: COMMERCIAL

## 2023-12-21 ENCOUNTER — NON-APPOINTMENT (OUTPATIENT)
Age: 33
End: 2023-12-21

## 2023-12-21 PROCEDURE — G2066: CPT

## 2023-12-21 PROCEDURE — 93298 REM INTERROG DEV EVAL SCRMS: CPT

## 2024-01-29 ENCOUNTER — APPOINTMENT (OUTPATIENT)
Dept: ELECTROPHYSIOLOGY | Facility: CLINIC | Age: 34
End: 2024-01-29

## 2024-02-27 ENCOUNTER — APPOINTMENT (OUTPATIENT)
Dept: ELECTROPHYSIOLOGY | Facility: CLINIC | Age: 34
End: 2024-02-27

## 2024-02-28 ENCOUNTER — NON-APPOINTMENT (OUTPATIENT)
Age: 34
End: 2024-02-28

## 2024-02-29 ENCOUNTER — APPOINTMENT (OUTPATIENT)
Dept: ELECTROPHYSIOLOGY | Facility: CLINIC | Age: 34
End: 2024-02-29
Payer: COMMERCIAL

## 2024-02-29 PROCEDURE — 93298 REM INTERROG DEV EVAL SCRMS: CPT

## 2024-03-28 ENCOUNTER — NON-APPOINTMENT (OUTPATIENT)
Age: 34
End: 2024-03-28

## 2024-03-28 ENCOUNTER — APPOINTMENT (OUTPATIENT)
Dept: ELECTROPHYSIOLOGY | Facility: CLINIC | Age: 34
End: 2024-03-28
Payer: COMMERCIAL

## 2024-03-28 PROCEDURE — 93296 REM INTERROG EVL PM/IDS: CPT

## 2024-03-28 PROCEDURE — 93295 DEV INTERROG REMOTE 1/2/MLT: CPT

## 2024-04-02 ENCOUNTER — NON-APPOINTMENT (OUTPATIENT)
Age: 34
End: 2024-04-02

## 2024-04-03 ENCOUNTER — APPOINTMENT (OUTPATIENT)
Dept: ELECTROPHYSIOLOGY | Facility: CLINIC | Age: 34
End: 2024-04-03
Payer: COMMERCIAL

## 2024-04-03 PROCEDURE — 93298 REM INTERROG DEV EVAL SCRMS: CPT

## 2024-04-23 ENCOUNTER — APPOINTMENT (OUTPATIENT)
Dept: ELECTROPHYSIOLOGY | Facility: CLINIC | Age: 34
End: 2024-04-23
Payer: COMMERCIAL

## 2024-04-23 ENCOUNTER — NON-APPOINTMENT (OUTPATIENT)
Age: 34
End: 2024-04-23

## 2024-04-23 VITALS
DIASTOLIC BLOOD PRESSURE: 65 MMHG | HEIGHT: 69 IN | OXYGEN SATURATION: 99 % | BODY MASS INDEX: 34.36 KG/M2 | SYSTOLIC BLOOD PRESSURE: 100 MMHG | WEIGHT: 232 LBS | HEART RATE: 69 BPM

## 2024-04-23 DIAGNOSIS — Z95.810 PRESENCE OF AUTOMATIC (IMPLANTABLE) CARDIAC DEFIBRILLATOR: ICD-10-CM

## 2024-04-23 DIAGNOSIS — I48.0 PAROXYSMAL ATRIAL FIBRILLATION: ICD-10-CM

## 2024-04-23 DIAGNOSIS — I49.01 VENTRICULAR FIBRILLATION: ICD-10-CM

## 2024-04-23 PROCEDURE — 93000 ELECTROCARDIOGRAM COMPLETE: CPT

## 2024-04-23 PROCEDURE — 99214 OFFICE O/P EST MOD 30 MIN: CPT | Mod: 25

## 2024-04-23 PROCEDURE — G2211 COMPLEX E/M VISIT ADD ON: CPT | Mod: NC,1L

## 2024-04-23 RX ORDER — QUINIDINE SULFATE TABLET 200 MG/1
200 TABLET ORAL
Qty: 60 | Refills: 0 | Status: ACTIVE | COMMUNITY
Start: 2024-04-23

## 2024-04-23 RX ORDER — QUINIDINE SULFATE TABLET 200 MG/1
200 TABLET ORAL
Qty: 180 | Refills: 3 | Status: DISCONTINUED | COMMUNITY
Start: 2020-03-06 | End: 2024-04-23

## 2024-04-23 NOTE — CARDIOLOGY SUMMARY
[de-identified] : 4/23/24: NSR with RBBB. [de-identified] : 10/26/2020 NEVAEH: LVEF: 55-60%. No LA/ECTOR thrombus. RA normal. RV normal. No significant valvular dysfunction. [de-identified] : 12/2013 Cardiac MRI: No LGE. Normal biventricular function. [de-identified] : ROSS MCGHEE (DOI: 2/4/22) Interrogation: No AF or VT since 10/2022.  [employer] S-ICD (DOI: 9/20/19) Interrogation: No events. 43% to ROSY.  09/20/2019: BS S-ICD Explant & Reimplant for lead fracture. 01/12/2016: Pocket revision of [facility] S-ICD generator. 03/252015: BS S-ICD premature battery depletion, generator replacement. 12/032013: BS S-ICD Implant [de-identified] : 10/26/2020 AF Ablation: RF PVI + APC at base of RAA. 03/02/2020 PVC Ablation: RF Moderator Band. [de-identified] : Genetic Testing: Variants of Unknown Significance Seen in: 1) ANK2 gene (p.Ywe8571Xew (W0617S) (CGG>TGG): c.28500 C>T in exon 39 of the ANK2 gene (NM_001148.4) is suggested to cause a deleterious effect on in-silico analysis). 2) SCN5A gene (p.Gll0155Ukn (T3480E) (CGG>TGG): c.84884 C>T in exon 39 of the ANK2 gene (NM_001148.4) is suggested to cause a deleterious effect on in-silico analysis).

## 2024-04-23 NOTE — PHYSICAL EXAM
[Well Developed] : well developed [Well Nourished] : well nourished [No Acute Distress] : no acute distress [Obese] : obese [Normal Conjunctiva] : normal conjunctiva [No Respiratory Distress] : no respiratory distress  [Normal Gait] : normal gait [No Edema] : no edema [No Rash] : no rash [Moves all extremities] : moves all extremities [No Focal Deficits] : no focal deficits [Alert and Oriented] : alert and oriented

## 2024-04-23 NOTE — HISTORY OF PRESENT ILLNESS
[FreeTextEntry1] : JESS CRAMER is a 33 year old male with ITP (off steroids, followed by Dr. 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  To summarize his history, he had VF arrest and underwent S-ICD implantation by Dr. Black at [facility] in 12/3/13. He had work up with genetic testing which was unrevealing. He was also diagnosed with AF in 8/2018.  He had premature battery depletion in 2015 requiring generator replacement. In 1/2016 he had posterior device migration causing inappropriate shocks from lead noise.   In 9/2019 he was found to have multiple high impedance alerts on his device. 2VCXR showed adequate lead positioning in the header. Given that the need for ICD was for secondary prevention, the system was extracted and reimplanted with a new lead in 9/20/2019.  Following ICD extraction, the patient did well until late 2/2020 after returning from a trip to [place] the patient had multiple ICD shocks for PVC-induced VF. The PVC had a LBLS axis with V5-V6 transition and narrow QRS suggestive of a right sided fascicular exit or moderator band exit. He was temporized with Isoproterenol and brought for ablation on 3/2/2020. The patient had rare PVCs upon arrival similar to clinical PVC. RV mapping demonstrated best PASO of about 80% from the moderator band. Despite aggressive stimulation, no consistent PVCs could be elicited. Therefore, empiric ablation was performed of the moderator band, during which right bundle branch block was precipitated. He also had frequent APCs from the lateral RAA which was partially suppressed with ablation. This area also triggered AF. After ablation he was started on Quinidine. Of note, NEVAEH before ablation showed possible ECTOR thrombus and so Apixaban was started after ablation. Quinidine was weaned off within a few months.  In follow up the patient had episodes of paroxysmal AF with RVR which precipitated monomorphic VT. He also had inappropriate shocks for AF with RVR. As such, AF ablation was pursued. On 10/2020 he underwent AF ablation with PVI and ablation of PACs from RAA.  After AF ablation he had AF with RVR associated with monomorphic WCT which self-terminated. He was restarted on Quinidine 200mg BID with which compliance was inconsistent.  While on Quinidine he did well and had no recurrent ICD shocks. He underwent ILR implant on 2/4/22 and was noted to have increasing AF burden around that time with burden up to 10%. Repeat ablation was discussed but ultimately canceled. He was off of Quinidine for about 1 year (throughout 2022) but he did not have any arrhythmias since 4/2022.  He had recurrent palpitations in 8/2023 and so restarted on Quinidine with improvement in symptoms.  Over the past year, he has done well but has only been taking Quinidine about once a day. He has no complaints and otherwise feels well.

## 2024-04-23 NOTE — DISCUSSION/SUMMARY
[EKG obtained to assist in diagnosis and management of assessed problem(s)] : EKG obtained to assist in diagnosis and management of assessed problem(s) [FreeTextEntry1] : JESS CRAMER is a 33 year old male with ITP (off steroids, followed by Dr. 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  He is doing well with no recurrent arrhythmias. Although he is on Quinidine 200mg BID, he only takes it once a day. He will continue to take it once a day since it seems to be effective.  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 It would be interesting to see if this could be investigated in the future.  Recommendations: - Continue Quinidine 200mg daily - Continue remote monitoring  Aydin Barkley MD, FACC, [facility] Clinical Cardiac Electrophysiology

## 2024-04-30 NOTE — H&P PST ADULT - [facility] MLT D E PC
Pt insurance co required yearly PA through cover my meds and was approved. Sending pt my chart message informing her of approval.    Your request has been approved  Your PA request for [id number] was approved for 365 days. The PA# assigned is [id number].  
regular rate and rhythm/no murmur

## 2024-05-23 ENCOUNTER — NON-APPOINTMENT (OUTPATIENT)
Age: 34
End: 2024-05-23

## 2024-05-24 ENCOUNTER — APPOINTMENT (OUTPATIENT)
Dept: ELECTROPHYSIOLOGY | Facility: CLINIC | Age: 34
End: 2024-05-24
Payer: COMMERCIAL

## 2024-05-24 PROCEDURE — 93298 REM INTERROG DEV EVAL SCRMS: CPT

## 2024-06-28 ENCOUNTER — APPOINTMENT (OUTPATIENT)
Dept: ELECTROPHYSIOLOGY | Facility: CLINIC | Age: 34
End: 2024-06-28

## 2024-06-28 PROCEDURE — 93298 REM INTERROG DEV EVAL SCRMS: CPT

## 2024-07-31 ENCOUNTER — APPOINTMENT (OUTPATIENT)
Dept: ELECTROPHYSIOLOGY | Facility: CLINIC | Age: 34
End: 2024-07-31
Payer: COMMERCIAL

## 2024-07-31 ENCOUNTER — NON-APPOINTMENT (OUTPATIENT)
Age: 34
End: 2024-07-31

## 2024-07-31 PROCEDURE — 93295 DEV INTERROG REMOTE 1/2/MLT: CPT

## 2024-07-31 PROCEDURE — 93296 REM INTERROG EVL PM/IDS: CPT

## 2024-09-03 ENCOUNTER — NON-APPOINTMENT (OUTPATIENT)
Age: 34
End: 2024-09-03

## 2024-09-04 ENCOUNTER — APPOINTMENT (OUTPATIENT)
Dept: ELECTROPHYSIOLOGY | Facility: CLINIC | Age: 34
End: 2024-09-04
Payer: COMMERCIAL

## 2024-09-04 PROCEDURE — 93298 REM INTERROG DEV EVAL SCRMS: CPT

## 2024-10-09 ENCOUNTER — NON-APPOINTMENT (OUTPATIENT)
Age: 34
End: 2024-10-09

## 2024-10-09 ENCOUNTER — APPOINTMENT (OUTPATIENT)
Dept: ELECTROPHYSIOLOGY | Facility: CLINIC | Age: 34
End: 2024-10-09
Payer: COMMERCIAL

## 2024-10-09 PROCEDURE — 93298 REM INTERROG DEV EVAL SCRMS: CPT

## 2024-11-11 ENCOUNTER — APPOINTMENT (OUTPATIENT)
Dept: ELECTROPHYSIOLOGY | Facility: CLINIC | Age: 34
End: 2024-11-11

## 2024-12-12 ENCOUNTER — NON-APPOINTMENT (OUTPATIENT)
Age: 34
End: 2024-12-12

## 2024-12-12 ENCOUNTER — APPOINTMENT (OUTPATIENT)
Dept: ELECTROPHYSIOLOGY | Facility: CLINIC | Age: 34
End: 2024-12-12

## 2024-12-12 ENCOUNTER — APPOINTMENT (OUTPATIENT)
Dept: ELECTROPHYSIOLOGY | Facility: CLINIC | Age: 34
End: 2024-12-12
Payer: COMMERCIAL

## 2024-12-12 PROCEDURE — 93298 REM INTERROG DEV EVAL SCRMS: CPT

## 2024-12-26 NOTE — DISCHARGE NOTE PROVIDER - NS AS DC PROVIDER CONTACT Y/N MULTI
Hypertriglyceridemia Monitoring: I explained this is common when taking isotretinoin. We will monitor closely. Male Yes

## 2025-01-13 ENCOUNTER — NON-APPOINTMENT (OUTPATIENT)
Age: 35
End: 2025-01-13

## 2025-01-13 ENCOUNTER — APPOINTMENT (OUTPATIENT)
Dept: ELECTROPHYSIOLOGY | Facility: CLINIC | Age: 35
End: 2025-01-13
Payer: COMMERCIAL

## 2025-01-13 PROCEDURE — 93296 REM INTERROG EVL PM/IDS: CPT

## 2025-01-13 PROCEDURE — 93295 DEV INTERROG REMOTE 1/2/MLT: CPT

## 2025-02-12 NOTE — DISCHARGE NOTE PROVIDER - NSDCCPGOAL_GEN_ALL_CORE_FT
The patient location is: [place]    Visit type: audiovisual    Face to Face time with patient: 10  20 minutes of total time spent on the encounter, which includes face to face time and non-face to face time preparing to see the patient (eg, review of tests), Obtaining and/or reviewing separately obtained history, Documenting clinical information in the electronic or other health record, Independently interpreting results (not separately reported) and communicating results to the patient/family/caregiver, or Care coordination (not separately reported).         Each patient to whom he or she provides medical services by telemedicine is:  (1) informed of the relationship between the physician and patient and the respective role of any other health care provider with respect to management of the patient; and (2) notified that he or she may decline to receive medical services by telemedicine and may withdraw from such care at any time.    Notes:     Office Visit  [facility]      Reason for Consult: pain and symptom management      ASSESSMENT/PLAN:     Plan/Recommendations:  Diagnoses and all orders for this visit:    Palliative care by specialist    Malignant neoplasm of right ovary    Peritoneal carcinomatosis    Restless leg syndrome    Chemotherapy-induced peripheral neuropathy    Cachexia              # Stage III Ovarian Cancer  # Palliative care by specialist  # Peritoneal carcinomatosis  Completed 6 cycles of Carboplatin/ Paclitaxel followed by Ex Lap/FANNY/BSO/OMX/debulking/LAR on 9/3/2024.  Currently on surveillance with Niraparib per gyn onc.   - Follow up gyn onc    # Restless leg syndrome  # Leg pain  Patient with throbbing bilateral thigh pain. No corresponding imaging present. Low concern for bone metastasis given bilateral location. Reports history of restless leg syndrome. Improvement with initiation of magnesium supplements. Not adherent to Mirapex due to sedation. Open to trial of  pregabalin for now. Will uptitrate as tolerated otherwise will consider ropinirole. Encouraged further evaluation of leg pain with referral and further imaging with GP. Also encouraged full GP evaluation for other causes of leg pain including vitamin deficiencies and general check up including complete lab workup now that cancer is under control thankfully. Previously had myopathy with atorvastatin however now on pravastatin which is preferred and not likely to be the cause of leg pain. Now intermittently using morphine and hydrocodone.   - Continue Norco 10-325mg PO q6h prn (discussed max 3g/day of APAP)  - Continue miralax prn for bowel regimen  - Continue Magnesium oxide 400mg PO BID scheduled (warned of diarrhea)  - Continue MSIR 15mg PO q12h prn  - Refer to PM&R-- encouraged scheduling an appointment for evaluation, discussed with patient and daughter Eugenia  - Continue Lyrica 50mg PO BID scheduled    # Chemotherapy induced peripheral neuropathy  Continued, weaned herself off of gabapentin and PCP switched over to Lyrica with minimal relief.   - Continue Lyrica 50mg PO BID scheduled    # Cancer cachexia  BMI 18<19<21. Poor appetite. Emesis after eating 2-3 bites. Could be a mechanical issue. Would like further eval with speech evaluation.  - Follow up nutrition    # Abdominal cramping  Morning abdominal cramping improved with bowel movements. Some improvement in pain with Bentyl.  - Continue Bentyl 10mg PO BID PRN  - Encouraged hydration, laxative use for more consistently regular bowel movements    Follow up: 1 month      SUBJECTIVE:     History of Present Illness:  Patient is a 72 y.o. year old female presenting with recent diagnosis of Stage IIIC ovarian cancer. She is a patient of Dr. Maliha Crawford. She presents via virtual visit for follow up.    2/12/25:  Increased strength with PT. Switched to pregabalin 50mg twice per day. Stopped gabapentin. Started pregabalin two weeks ago with no relief. Still having  "trouble walking. Still weak.     Alternating hydrocodone and morphine very infrequently. Cuts the pain down but does not stop it. "As soon as it wears off it comes back again".     Was constipated but miralax helped.     Losing weight from not eating.     1/24/25:  Bottom half of calves and toes included in leg pain now  Stopped lyrica and gabapentin not working  Just using aleve   On way to therapy. Went three times   Therapist thinks hip may be misaligned  Going to xray hip   Using Norco every now and then     Only taking 200mg gabapentin at bedtime    Appetite getting better   Walking is getting better. Needs walker though.    12/18/24:  Still has leg pain. Has had further evaluation. Walking with a walker. Taking Lyrica at night. Feels no difference in pain. Taking hydrocodone which is helping pain. Not as bad now.     Appetite is still poor.     Discussed at length with patient's daughter Eugenia with patient's permission. Eugenia states that she is taking pain medication sparingly and is getting weaker. She states that the patient will not move to live with her and will demand to stay with her  in their home. She states that she does not think that the patient will take recommendations from another doctor.     12/3/24:  Has been taking Mirapex once per day but still has leg pain despite this. Using a walker. Having trouble walking. Discussed avenues for further evaluation including imaging and referral. Continued poor appetite. Eats a few bites and then coughs and vomits. Gags after 2-3 bites. Happening for 3-4 days.     Wakes up with stomach pain. Taking Bentyl 10mg which helps but pain comes back. Having mostly regular bowel movements.    Discussed with patient's daughter Eugenia at length. She states Ms Blanco is not adherent to Mirapex due to sedation. Discussed utility of pregabalin versus ropinirole.     11/5/24:  Moving around slowly. Pain is still present. Legs "are killing me". Legs are weak. " Optimize cardiac health. Still using a walker to get around.     Reports throbbing pain in legs. Can't find anything to ease it except Norco. Nothing helps but Norco. Only lasts 5 hours. Its in top part of legs in thighs. Feels neuropathy is better, less numbness/tingling.     Pain is better with walking. Hurt so bad she does not feel she wants to walk.     Has a history of restless leg syndrome in the past. Treated that with over the counter in the past and started magnesium oxide recently. Felt magnesium helped if she takes it every day. Feel it could be restless leg syndrome. If she misses magnesium she has unbearable pain. PCP wants to monitor labs with magnesium twice per day if scheduled and not as needed.     Cannot take benadryl due to flaring up legs.     Taking Bentyl for cramps. Felt capsule form worked better at 10mg.     10/9/24:  A little stronger since going to rehab. Legs are still weak. Aching. Was at rehab for 2 weeks. Walking well with a walker now. Appetite is poor but she is eating more than before. For pain, she is taking tramadol 50mg twice per day. Does not feel like it is helping her pain. Wants to try [facility] again. Daughter states it is hard to do rehab because of pain.     Has abdominal pain and is taking bentyl for it.     Seeing Dr. Crawford soon.     9/18/24:  Pain is better but still feels tired. Feeling weak as well and fell. Fell three times yesterday and hit her head. Was trying to get out of bed to get to bedside commode and lost balance. If she gets a small stomach pain she takes tylenol and it works well.     Sister reports was not eating due to drowsiness with taking oxycodone. Lead to weight loss. This occurred in the hospital. This lead to choice to avoid oxycodone 5 and 10mg.     Concern for withdrawal while in the hospital including clamminess.     Having a bowel movement helped. Think she may be lactose intolerant.     Considering admission to acute rehab facility near their home for more intensive  rehab and nutrition.    Has home health.    Taking two tylenol twice per day. Not taking ibuprofen or gabapentin at this time.     Biggest concern is eating.       9/10/24:    Alternating tylenol and ibuprofen every 3 hours. Cannot sleep. Feeling weak after surgery. Blood sugar has been going all over the place due to poor oral intake. Holding gabapentin because it is making her too drowsy.     Family feels she needs more protein to gain strength. Has home health now. Previously taking oxycodone on empty stomach. Taking zofran for nausea. Requesting zofran that does not dissolve.    For pain, feel that tylenol and ibuprofen has been helping well for pain. Needs a refill for oxycodone 5mg. Using sparingly twice per day usually.     Having easy bowel movements with miralax.     8/20/24:  Feels neuropathy is the worst and wants to increase gabapentin dose. Neuropathy is in right thigh down into leg. Describes as throbbing pain.    Went up to 200mg at night starting last night.     The patient's sister was also present during the encounter and raised concerns regarding constipation and not wanting to have some any medications related to the colon prior to surgery.  We discussed the high probability of constipation with hydrocodone and with oxycodone use.  She has been using laxatives which they are fearful of.  Discussed nonpharmacological options including increased water intake and prune juice consumption for more natural ways of preventing constipation.  The patient has found greater benefit from oxycodone at this time.  And has been taking Tylenol intermittently for pain as well.  They are aware to stay less than 2000 mg per day.  They are anticipating surgery soon and are aware that pain management needs may change postoperatively.    8/13/24:  Pain right now is a 5-6/10. Talked to Dr. Crawford about gabapentin.  got frustrated on morphine 30mg due to patient due to sleeping a lot and feeling foggy. Delay in  answering. Weaned off of morphine. CT scan is Monday to rule out kidney stone. Taking oxycodone every 4-6 hours.     Working on constipation at night even though she's not constipated.     7/12/24:  Currently using Norco 2-3 times per day. Got scared of morphine so she stopped taking it and requested Norco reinitiation prior to this appointment. Discussed use of morphine and stereotyped fears. Having daily bms. Saw colorectal surgery, planning surgery for September. Delayed due to recent clot.    She endorses numbness and tingling of her hands and feet. Discussed neuropathy. She is open to try treatment.    6/12/24: She states she is doing really well. She had her treatment yesterday and tolerated it well. No pain at all after being on the half tablet of the morphine. She denies any breakthrough at all. She has been able to resume her housework. She feels almost back to normal. She gets tired. Takes a nap once a day. She is having daily bowel movements. She takes senna daily.    5/29/24:Overall pain is a little better. She feels stronger pain pill is lasting a bit longer and quicker onset. Still having pain though. She takes pain medication every 4-5 hours.     She plans to start PT. Her PCP is managing her care overall but her and her family are happy to keep all pain medications with palliative care. She is currently still in [place] with family.     She is having bowel movements every day. She is taking senna everyday.     5/15/24:  Pain has worsened. She has had excruciating abdominal pain. She could not sleep after the pain medication took effect. Then had sweats. She has been taking the Norco 5mg every 6 hours but it takes 45-60 minutes to take effect.     Has started taking two senna s to prevent constipation. Started this yesterday. She had a good BM today.     Current meds for nausea have been helping.     Very groggy days after chemotherapy.       Initial visit:    Introduced the role of palliative care  "including symptom management and advance care planning through the cancer journey.    She reports pain rated 10/10. When she takes a pain pill it drops down to a 4-5/10. She takes hydrocodone. Pain is located in her right side of the abdomen. Describes this pain as shooting and throbbing.     She reports daily bowel movements. Denies nausea, vomiting. She had her first infusion yesterday. She had no side effects.     Her daughter states that her mother hates pain medications. Family has noted grunting. Family has noticed movement would exacerbate pain. Would be hard to find a comfortable position to sit in. Patient wants to "take the edge off" and rest comfortably. Benadryl knocks her out.     The increased dose of hydrocodone at 7.5 helped better. She takes one dose in the morning and one at night. She sometimes take an additional dose if she takes a long car ride.     She does not feel strong. She was in the recliner all day.       Past Medical History:   Diagnosis Date    Cancer     Diabetes mellitus, type 2     Hyperlipidemia     Hypertension     Hypothyroidism     Pulmonary embolism      Past Surgical History:   Procedure Laterality Date    BILATERAL SALPINGO-OOPHORECTOMY (BSO) N/A 9/3/2024    Procedure: SALPINGO-OOPHORECTOMY, BILATERAL;  Surgeon: Maliha Crawford MD;  Location: [facility] OR [address] [place] [address];  Service: Gynecology Oncology;  Laterality: N/A;    DEBULKING OF TUMOR N/A 9/3/2024    Procedure: DEBULKING, NEOPLASM;  Surgeon: Maliha Crawford MD;  Location: [facility] OR [address] [place] [address];  Service: Gynecology Oncology;  Laterality: N/A;    FLEXIBLE SIGMOIDOSCOPY N/A 9/3/2024    Procedure: SIGMOIDOSCOPY, FLEXIBLE;  Surgeon: Ruby Caicedo MD;  Location: [facility] OR [address] [place] [address];  Service: Colon and Rectal;  Laterality: N/A;    ILEOCOLECTOMY N/A 9/3/2024    Procedure: ILEOCOLECTOMY;  Surgeon: Ruby Caicedo MD;  Location: [facility] OR [address] [place] [address];  Service: Colon and Rectal;  Laterality: N/A;    LAPAROTOMY, EXPLORATORY N/A 9/3/2024    Procedure: " LAPAROTOMY, EXPLORATORY;  Surgeon: Maliha Crawford MD;  [facility];  Service: Gynecology Oncology;  Laterality: N/A;  4 hr case    LOW ANTERIOR RESECTION OF COLON N/A 9/3/2024    Procedure: RESECTION, COLON, LOW ANTERIOR;  Surgeon: Ruby Caicedo MD;  Location[facility];  Service: Colon and Rectal;  Laterality: N/A;  4 hr case    LYSIS OF ADHESIONS OF URETER Bilateral 9/3/2024    Procedure: URETEROLYSIS;  Surgeon: Maliha Crawford MD;  [facility]: [facility];  Service: Gynecology Oncology;  Laterality: Bilateral;    MOBILIZATION OF SPLENIC FLEXURE  9/3/2024    Procedure: MOBILIZATION, SPLENIC FLEXURE;  Surgeon: Ruby Caicedo MD;  Location: [facility];  Service: Colon and Rectal;;    OMENTECTOMY N/A 9/3/2024    Procedure: OMENTECTOMY;  Surgeon: Maliha Crawford MD;  Location: [facility];  Service: Gynecology Oncology;  Laterality: N/A;    TOTAL ABDOMINAL HYSTERECTOMY N/A 9/3/2024    Procedure: HYSTERECTOMY, TOTAL, ABDOMINAL;  Surgeon: Maliha Crawford MD;  Location: [facility];  Service: Gynecology Oncology;  Laterality: N/A;    TUBAL LIGATION      at age 30's     Family History   Problem Relation Name Age of Onset    Cancer Brother      Breast cancer Neg Hx      Colon cancer Neg Hx      Ovarian cancer Neg Hx      Uterine cancer Neg Hx      Cervical cancer Neg Hx       Review of patient's allergies indicates:  No Known Allergies  Social Drivers of Health     Tobacco Use: Low Risk  (1/6/2025)    Patient History     Smoking Tobacco Use: Never     Smokeless Tobacco Use: Never     Passive Exposure: Past   Alcohol Use: Not At Risk (7/5/2024)    AUDIT-C     Frequency of Alcohol Consumption: Never     Average Number of Drinks: Patient does not drink     Frequency of Binge Drinking: Never   Financial Resource Strain: Patient Unable To Answer (9/4/2024)    Overall Financial Resource Strain (CARDIA)     Difficulty of Paying Living Expenses: Patient unable to answer   Food Insecurity:  Patient Unable To Answer (9/4/2024)    Hunger Vital Sign     Worried About Running Out of Food in the Last Year: Patient unable to answer     Ran Out of Food in the Last Year: Patient unable to answer   Transportation Needs: Patient Unable To Answer (9/4/2024)    TRANSPORTATION NEEDS     Transportation : Patient unable to answer   Physical Activity: Inactive (7/5/2024)    Exercise Vital Sign     Days of Exercise per Week: 0 days     Minutes of Exercise per Session: 20 min   Stress: Patient Unable To Answer (9/4/2024)    [employer] - Occupational Stress Questionnaire     Feeling of Stress : Patient unable to answer   Housing Stability: Patient Unable To Answer (9/4/2024)    Housing Stability Vital Sign     Unable to Pay for Housing in the Last Year: Patient unable to answer     Number of Times Moved in the Last Year: Not on file     Homeless in the Last Year: Patient unable to answer   Depression: Low Risk  (11/19/2024)    Depression     Last PHQ-4: Flowsheet Data: 0   Utilities: Patient Unable To Answer (9/4/2024)    [facility] Utilities     Threatened with loss of utilities: Patient unable to answer   Health Literacy: Patient Unable To Answer (9/4/2024)     Health Literacy     Frequency of need for help with medical instructions: Patient unable to respond   Recent Concern: Health Literacy - Inadequate Health Literacy (7/5/2024)     Health Literacy     Frequency of need for help with medical instructions: Sometimes   Social Isolation: Not on file          Medications:    Current Outpatient Medications:     acetaminophen (TYLENOL) 500 MG tablet, Take 2 tablets (1,000 mg total) by mouth every 6 (six) hours., Disp: 30 tablet, Rfl: 3    amLODIPine (NORVASC) 5 MG tablet, Take 1 tablet by mouth every morning., Disp: , Rfl:     apixaban (ELIQUIS) 5 mg Tab, Take 1 tablet (5 mg total) by mouth 2 (two) times daily., Disp: 60 tablet, Rfl: 2    blood-glucose meter,continuous (DEXCOM G7 )  Misc, Use daily, Disp: , Rfl:     blood-glucose sensor (DEXCOM G7 SENSOR) [place], Use every 10 days, Disp: , Rfl:     dexAMETHasone (DECADRON) 4 MG Tab, Take 8 mg (2 tablets) by mouth daily on days 2-4 of each chemotherapy cycle., Disp: 6 tablet, Rfl: 11    EScitalopram oxalate (LEXAPRO) 10 MG tablet, Take 1 tablet (10 mg total) by mouth once daily., Disp: 90 tablet, Rfl: 3    gabapentin (NEURONTIN) 100 MG capsule, Take 3 capsules (300 mg total) by mouth every evening for 7 days, THEN 4 capsules (400 mg total) every evening for 23 days., Disp: 113 capsule, Rfl: 0    HYDROcodone-acetaminophen (NORCO)  mg per tablet, Take 1 tablet by mouth every 6 (six) hours as needed for Pain., Disp: 120 tablet, Rfl: 0    ibuprofen (ADVIL,MOTRIN) 600 MG tablet, Take 1 tablet every 6 hours. Alternate between ibuprofen & tylenol every 3 hours. For example: @0800: ibuprofen 600mg @1100: tylenol 1000mg @1400: ibuprofen 600mg @1700: tylenol 1000 mg @2000: ibuprofen 600mg. Can take two of the 300 mg over the counter ibuprofen is smaller pill is easier., Disp: 30 tablet, Rfl: 3    levothyroxine (SYNTHROID) 75 MCG tablet, Take 1 tablet by mouth every morning., Disp: , Rfl:     LYUMJEV KWIKPEN U-100 INSULIN 100 unit/mL pen, Inject 2-10 Units into the skin 3 (three) times daily with meals., Disp: , Rfl:     magnesium oxide (MAGOX) 400 mg (241.3 mg magnesium) tablet, Take 1 tablet (400 mg total) by mouth once daily., Disp: 200 tablet, Rfl: 1    metFORMIN (GLUCOPHAGE) 1000 MG tablet, Take 1,000 mg by mouth 2 (two) times daily with meals., Disp: , Rfl:     metoprolol succinate (TOPROL-XL) 50 MG 24 hr tablet, Take 1 tablet by mouth every morning., Disp: , Rfl:     mirtazapine (REMERON) 7.5 MG Tab, Take 7.5 mg by mouth every evening., Disp: , Rfl:     naloxone (NARCAN) 4 mg/actuation Spry, 1 spray (4 mg total) by Nasal route 1 (one) time if needed (for emergency opioid reversal)., Disp: 1 each, Rfl: 0    OLANZapine (ZYPREXA) 5 MG tablet, Take  1 tablet by mouth nightly on days 1-4 of each chemotherapy cycle., Disp: 4 tablet, Rfl: 11    ondansetron (ZOFRAN) 4 MG tablet, Take 1 tablet (4 mg total) by mouth every 6 (six) hours as needed for Nausea., Disp: 120 tablet, Rfl: 2    ondansetron (ZOFRAN-ODT) 4 MG TbDL, DISSOLVE ONE TABLET BY MOUTH EVERY 4 TO 6 HOURS AS NEEDED FOR NAUSEA, Disp: , Rfl:     polyethylene glycol (GLYCOLAX) 17 gram/dose powder, Mix 1 capful (17 g) in liquid and drink by mouth once daily., Disp: 510 g, Rfl: 1    potassium chloride (KLOR-CON) 10 MEQ TbSR, Take 1 tablet (10 mEq total) by mouth 2 (two) times daily., Disp: 30 tablet, Rfl: 0    pravastatin (PRAVACHOL) 10 MG tablet, Take 1 tablet by mouth every evening., Disp: , Rfl:     predniSONE (DELTASONE) 5 MG tablet, Take 5 mg by mouth., Disp: , Rfl:     prochlorperazine (COMPAZINE) 5 MG tablet, Take 1 tablet (5 mg total) by mouth every 6 (six) hours as needed for Nausea., Disp: 20 tablet, Rfl: 5    TOUJEO SOLOSTAR U-300 INSULIN 300 unit/mL (1.5 mL) InPn pen, Inject 10 Units into the skin once daily., Disp: , Rfl:     OBJECTIVE:       ROS:  Review of Systems   Constitutional:  Positive for appetite change and fatigue. Negative for unexpected weight change.   HENT:  Negative for mouth sores.    Eyes:  Negative for visual disturbance.   Respiratory:  Negative for cough and shortness of breath.    Cardiovascular:  Negative for chest pain.   Gastrointestinal:  Negative for abdominal pain and diarrhea.   Genitourinary:  Negative for frequency.   Musculoskeletal:  Negative for back pain.   Skin:  Negative for rash.   Neurological:  Positive for numbness. Negative for headaches.   Hematological:  Negative for adenopathy.   Psychiatric/Behavioral:  The patient is not nervous/anxious.        Review of Symptoms      Symptom Assessment (ESAS 0-10 Scale)  Pain:  0  Dyspnea:  0  Anxiety:  0  Nausea:  0  Depression:  0  Anorexia:  0  Fatigue:  0  Insomnia:  0  Restlessness:  0  Agitation:  0        Anxiety:  Is not nervous/anxious    ECOG Performance Status ndGndrndanddndend:nd nd2nd Living Arrangements:  Lives with family    Psychosocial/Cultural:   See Palliative Psychosocial Note: Yes  **Primary  to Follow**  Palliative Care  Consult: Yes     Time-Based Charting:  Yes  Chart Review: 10 minutes  Face to Face: 10 minutes    Total Time Spent: 20 minutes      Advance Care Planning   Advance Directives:     Decision Making:  Patient answered questions  Goals of Care: The patient endorses that what is most important right now is to focus on symptom/pain control    Accordingly, we have decided that the best plan to meet the patient's goals includes continuing with treatment              Physical Exam:  Vitals:    Physical Exam  Constitutional:       General: She is not in acute distress.     Appearance: Normal appearance. She is ill-appearing. She is not toxic-appearing.   Musculoskeletal:      Cervical back: Normal range of motion.   Skin:     Coloration: Skin is not jaundiced or pale.   Neurological:      Mental Status: She is alert and oriented to person, place, and time.   Psychiatric:         Mood and Affect: Mood normal.         Behavior: Behavior normal.         Thought Content: Thought content normal.         Judgment: Judgment normal.         Labs:  CBC:   WBC   Date Value Ref Range Status   01/06/2025 7.24 3.90 - 12.70 K/uL Final   11/18/2024 9.03 3.90 - 12.70 K/uL Final     Hemoglobin   Date Value Ref Range Status   01/06/2025 13.1 12.0 - 16.0 g/dL Final     POC Hematocrit   Date Value Ref Range Status   09/03/2024 27 (L) 36 - 54 %PCV Final     Hematocrit   Date Value Ref Range Status   01/06/2025 38.2 37.0 - 48.5 % Final     MCV   Date Value Ref Range Status   01/06/2025 93 82 - 98 fL Final     Platelets   Date Value Ref Range Status   01/06/2025 351 150 - 450 K/uL Final       LFT:   Lab Results   Component Value Date    AST 14 01/06/2025    ALKPHOS 43 01/06/2025    BILITOT 0.4  01/06/2025       Albumin:   Albumin   Date Value Ref Range Status   01/06/2025 4.2 3.5 - 5.2 g/dL Final     Protein:   Total Protein   Date Value Ref Range Status   01/06/2025 7.4 6.0 - 8.4 g/dL Final       Radiology:  CT abd pelvis with IV contrast 8/19/24:  FINDINGS:     Lower chest: Unremarkable.  Liver: 9 x 7 mm hypodensity in left hepatic lobe (series 5, image 23).  This was present in April 2024 and may represent a cyst.  Gallbladder: Unremarkable.  Biliary: No significant biliary ductal dilatation.  Pancreas: Unremarkable.  Spleen: Unremarkable.  Adrenal glands: Unremarkable.  Kidneys/ureters: Unremarkable.  Bladder: Unremarkable.  Reproductive: Uterus is unremarkable.  Persistent cystic area in the expected location of the right ovary measures approximately 3.4 x 2.5 cm.  Other previously demonstrated cystic lesions in the pelvis are no longer present.  There is some persistent thickening along the left pelvic wall as seen on series 5, image 126.  Gastrointestinal: No bowel obstruction or inflammation. No intraperitoneal free air or significant free fluid.  Retroperitoneum: No adenopathy.  Vascular: Atherosclerotic disease.  No abdominal aortic aneurysm.  Osseous: No acute fracture or aggressive appearing osseous lesion.        Impression:     1.    Significant improvement in the previous cystic lesions in the pelvis.  With some residual in the right pelvis and along the pelvic wall and the left.  2.    Similar appearance of the subcentimeter hypodensity left hepatic lobe which statistically represents a cyst; however, continued attention on follow-up is recommended.    20 minutes of total time spent on the encounter, which includes face to face time and non-face to face time preparing to see the patient (eg, review of tests), Obtaining and/or reviewing separately obtained history, Documenting clinical information in the electronic or other health record, Independently interpreting results (not separately  reported) and communicating results to the patient/family/caregiver, or Care coordination (not separately reported).    Signature: Kristen Blum DO

## 2025-02-13 ENCOUNTER — APPOINTMENT (OUTPATIENT)
Dept: ELECTROPHYSIOLOGY | Facility: CLINIC | Age: 35
End: 2025-02-13

## 2025-02-13 PROCEDURE — 93298 REM INTERROG DEV EVAL SCRMS: CPT

## 2025-03-20 ENCOUNTER — NON-APPOINTMENT (OUTPATIENT)
Age: 35
End: 2025-03-20

## 2025-03-20 ENCOUNTER — APPOINTMENT (OUTPATIENT)
Dept: ELECTROPHYSIOLOGY | Facility: CLINIC | Age: 35
End: 2025-03-20
Payer: COMMERCIAL

## 2025-03-20 PROCEDURE — 93298 REM INTERROG DEV EVAL SCRMS: CPT

## 2025-04-22 ENCOUNTER — NON-APPOINTMENT (OUTPATIENT)
Age: 35
End: 2025-04-22

## 2025-04-23 ENCOUNTER — APPOINTMENT (OUTPATIENT)
Dept: ELECTROPHYSIOLOGY | Facility: CLINIC | Age: 35
End: 2025-04-23

## 2025-04-23 VITALS
HEART RATE: 57 BPM | BODY MASS INDEX: 28.14 KG/M2 | WEIGHT: 190 LBS | HEIGHT: 69 IN | DIASTOLIC BLOOD PRESSURE: 60 MMHG | SYSTOLIC BLOOD PRESSURE: 102 MMHG | OXYGEN SATURATION: 97 %

## 2025-04-23 DIAGNOSIS — Z45.02 ENCOUNTER FOR ADJUSTMENT AND MANAGEMENT OF AUTOMATIC IMPLANTABLE CARDIAC DEFIBRILLATOR: ICD-10-CM

## 2025-04-23 DIAGNOSIS — Z95.810 PRESENCE OF AUTOMATIC (IMPLANTABLE) CARDIAC DEFIBRILLATOR: ICD-10-CM

## 2025-04-23 DIAGNOSIS — I48.0 PAROXYSMAL ATRIAL FIBRILLATION: ICD-10-CM

## 2025-04-23 DIAGNOSIS — I49.01 VENTRICULAR FIBRILLATION: ICD-10-CM

## 2025-04-23 DIAGNOSIS — I47.20 VENTRICULAR TACHYCARDIA, UNSPECIFIED: ICD-10-CM

## 2025-04-23 PROCEDURE — 99214 OFFICE O/P EST MOD 30 MIN: CPT | Mod: 25

## 2025-04-23 PROCEDURE — 93289 INTERROG DEVICE EVAL HEART: CPT

## 2025-04-23 PROCEDURE — 93000 ELECTROCARDIOGRAM COMPLETE: CPT | Mod: 59

## 2025-04-24 ENCOUNTER — NON-APPOINTMENT (OUTPATIENT)
Age: 35
End: 2025-04-24

## 2025-05-29 ENCOUNTER — APPOINTMENT (OUTPATIENT)
Dept: ELECTROPHYSIOLOGY | Facility: CLINIC | Age: 35
End: 2025-05-29
Payer: COMMERCIAL

## 2025-05-29 ENCOUNTER — NON-APPOINTMENT (OUTPATIENT)
Age: 35
End: 2025-05-29

## 2025-05-29 PROCEDURE — 93298 REM INTERROG DEV EVAL SCRMS: CPT

## 2025-06-30 ENCOUNTER — APPOINTMENT (OUTPATIENT)
Dept: ELECTROPHYSIOLOGY | Facility: CLINIC | Age: 35
End: 2025-06-30
Payer: COMMERCIAL

## 2025-06-30 ENCOUNTER — NON-APPOINTMENT (OUTPATIENT)
Age: 35
End: 2025-06-30

## 2025-06-30 PROCEDURE — 93298 REM INTERROG DEV EVAL SCRMS: CPT

## 2025-08-01 ENCOUNTER — APPOINTMENT (OUTPATIENT)
Dept: ELECTROPHYSIOLOGY | Facility: CLINIC | Age: 35
End: 2025-08-01
Payer: COMMERCIAL

## 2025-08-01 ENCOUNTER — NON-APPOINTMENT (OUTPATIENT)
Age: 35
End: 2025-08-01

## 2025-08-01 PROCEDURE — 93298 REM INTERROG DEV EVAL SCRMS: CPT

## 2025-09-05 ENCOUNTER — NON-APPOINTMENT (OUTPATIENT)
Age: 35
End: 2025-09-05

## 2025-09-05 ENCOUNTER — APPOINTMENT (OUTPATIENT)
Dept: ELECTROPHYSIOLOGY | Facility: CLINIC | Age: 35
End: 2025-09-05
Payer: COMMERCIAL

## 2025-09-05 PROCEDURE — 93298 REM INTERROG DEV EVAL SCRMS: CPT
